# Patient Record
Sex: FEMALE | Race: WHITE | NOT HISPANIC OR LATINO | Employment: OTHER | URBAN - METROPOLITAN AREA
[De-identification: names, ages, dates, MRNs, and addresses within clinical notes are randomized per-mention and may not be internally consistent; named-entity substitution may affect disease eponyms.]

---

## 2017-02-09 ENCOUNTER — TRANSCRIBE ORDERS (OUTPATIENT)
Dept: ADMINISTRATIVE | Facility: HOSPITAL | Age: 82
End: 2017-02-09

## 2017-02-09 DIAGNOSIS — I72.9 ANEURYSM OF UNSPECIFIED SITE (HCC): Primary | ICD-10-CM

## 2017-02-17 ENCOUNTER — HOSPITAL ENCOUNTER (OUTPATIENT)
Dept: CT IMAGING | Facility: HOSPITAL | Age: 82
Discharge: HOME | End: 2017-02-17
Attending: SURGERY | Admitting: SURGERY

## 2017-02-17 DIAGNOSIS — I72.9 ANEURYSM OF UNSPECIFIED SITE (HCC): ICD-10-CM

## 2017-02-17 LAB — CREAT BLDA-MCNC: 0.9 MG/DL (ref 0.6–1.3)

## 2017-02-17 PROCEDURE — 82565 ASSAY OF CREATININE: CPT

## 2017-02-17 PROCEDURE — 0 IOPAMIDOL 61 % SOLUTION: Performed by: SURGERY

## 2017-02-17 PROCEDURE — 74174 CTA ABD&PLVS W/CONTRAST: CPT

## 2017-02-17 RX ADMIN — IOPAMIDOL 85 ML: 612 INJECTION, SOLUTION INTRAVENOUS at 13:15

## 2017-03-02 ENCOUNTER — TRANSCRIBE ORDERS (OUTPATIENT)
Dept: ADMINISTRATIVE | Facility: HOSPITAL | Age: 82
End: 2017-03-02

## 2017-03-02 DIAGNOSIS — I71.40 ABDOMINAL AORTIC ANEURYSM (AAA) WITHOUT RUPTURE (HCC): Primary | ICD-10-CM

## 2017-04-28 ENCOUNTER — OFFICE VISIT (OUTPATIENT)
Dept: INFECTIOUS DISEASES | Facility: CLINIC | Age: 82
End: 2017-04-28

## 2017-04-28 ENCOUNTER — APPOINTMENT (OUTPATIENT)
Dept: LAB | Facility: HOSPITAL | Age: 82
End: 2017-04-28

## 2017-04-28 VITALS
RESPIRATION RATE: 12 BRPM | DIASTOLIC BLOOD PRESSURE: 79 MMHG | WEIGHT: 158.2 LBS | HEART RATE: 61 BPM | HEIGHT: 68 IN | BODY MASS INDEX: 23.98 KG/M2 | SYSTOLIC BLOOD PRESSURE: 157 MMHG | TEMPERATURE: 98.5 F

## 2017-04-28 DIAGNOSIS — I72.9 MYCOTIC ANEURYSM (HCC): ICD-10-CM

## 2017-04-28 DIAGNOSIS — Z79.2 LONG TERM (CURRENT) USE OF ANTIBIOTICS: ICD-10-CM

## 2017-04-28 DIAGNOSIS — A49.01 MSSA (METHICILLIN SUSCEPTIBLE STAPHYLOCOCCUS AUREUS) INFECTION: Primary | ICD-10-CM

## 2017-04-28 LAB
ALBUMIN SERPL-MCNC: 3.9 G/DL (ref 3.5–5.2)
ALBUMIN/GLOB SERPL: 1.2 G/DL
ALP SERPL-CCNC: 68 U/L (ref 39–117)
ALT SERPL W P-5'-P-CCNC: 10 U/L (ref 1–33)
ANION GAP SERPL CALCULATED.3IONS-SCNC: 12.3 MMOL/L
AST SERPL-CCNC: 19 U/L (ref 1–32)
BASOPHILS # BLD AUTO: 0.03 10*3/MM3 (ref 0–0.2)
BASOPHILS NFR BLD AUTO: 0.4 % (ref 0–1.5)
BILIRUB SERPL-MCNC: 0.7 MG/DL (ref 0.1–1.2)
BUN BLD-MCNC: 24 MG/DL (ref 8–23)
BUN/CREAT SERPL: 26.1 (ref 7–25)
CALCIUM SPEC-SCNC: 9 MG/DL (ref 8.6–10.5)
CHLORIDE SERPL-SCNC: 103 MMOL/L (ref 98–107)
CO2 SERPL-SCNC: 27.7 MMOL/L (ref 22–29)
CREAT BLD-MCNC: 0.92 MG/DL (ref 0.57–1)
CRP SERPL-MCNC: 0.18 MG/DL (ref 0–0.5)
DEPRECATED RDW RBC AUTO: 45.3 FL (ref 37–54)
EOSINOPHIL # BLD AUTO: 0.15 10*3/MM3 (ref 0–0.7)
EOSINOPHIL NFR BLD AUTO: 2.2 % (ref 0.3–6.2)
ERYTHROCYTE [DISTWIDTH] IN BLOOD BY AUTOMATED COUNT: 14 % (ref 11.7–13)
GFR SERPL CREATININE-BSD FRML MDRD: 58 ML/MIN/1.73
GLOBULIN UR ELPH-MCNC: 3.3 GM/DL
GLUCOSE BLD-MCNC: 94 MG/DL (ref 65–99)
HCT VFR BLD AUTO: 41 % (ref 35.6–45.5)
HGB BLD-MCNC: 13.3 G/DL (ref 11.9–15.5)
IMM GRANULOCYTES # BLD: 0 10*3/MM3 (ref 0–0.03)
IMM GRANULOCYTES NFR BLD: 0 % (ref 0–0.5)
LYMPHOCYTES # BLD AUTO: 2.19 10*3/MM3 (ref 0.9–4.8)
LYMPHOCYTES NFR BLD AUTO: 31.7 % (ref 19.6–45.3)
MCH RBC QN AUTO: 28.9 PG (ref 26.9–32)
MCHC RBC AUTO-ENTMCNC: 32.4 G/DL (ref 32.4–36.3)
MCV RBC AUTO: 89.1 FL (ref 80.5–98.2)
MONOCYTES # BLD AUTO: 0.67 10*3/MM3 (ref 0.2–1.2)
MONOCYTES NFR BLD AUTO: 9.7 % (ref 5–12)
NEUTROPHILS # BLD AUTO: 3.86 10*3/MM3 (ref 1.9–8.1)
NEUTROPHILS NFR BLD AUTO: 56 % (ref 42.7–76)
PLATELET # BLD AUTO: 136 10*3/MM3 (ref 140–500)
PMV BLD AUTO: 11 FL (ref 6–12)
POTASSIUM BLD-SCNC: 4.2 MMOL/L (ref 3.5–5.2)
PROT SERPL-MCNC: 7.2 G/DL (ref 6–8.5)
RBC # BLD AUTO: 4.6 10*6/MM3 (ref 3.9–5.2)
SODIUM BLD-SCNC: 143 MMOL/L (ref 136–145)
WBC NRBC COR # BLD: 6.9 10*3/MM3 (ref 4.5–10.7)

## 2017-04-28 PROCEDURE — 80053 COMPREHEN METABOLIC PANEL: CPT | Performed by: INTERNAL MEDICINE

## 2017-04-28 PROCEDURE — 99214 OFFICE O/P EST MOD 30 MIN: CPT | Performed by: INTERNAL MEDICINE

## 2017-04-28 PROCEDURE — 36415 COLL VENOUS BLD VENIPUNCTURE: CPT | Performed by: INTERNAL MEDICINE

## 2017-04-28 PROCEDURE — 85025 COMPLETE CBC W/AUTO DIFF WBC: CPT | Performed by: INTERNAL MEDICINE

## 2017-04-28 PROCEDURE — 86140 C-REACTIVE PROTEIN: CPT | Performed by: INTERNAL MEDICINE

## 2017-04-28 RX ORDER — DOXYCYCLINE HYCLATE 100 MG/1
100 CAPSULE ORAL 2 TIMES DAILY
Qty: 180 CAPSULE | Refills: 3 | Status: SHIPPED | OUTPATIENT
Start: 2017-04-28 | End: 2017-10-29 | Stop reason: SDUPTHER

## 2017-04-28 NOTE — PROGRESS NOTES
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 She has been doing well since her popliteal aneurysm repair on 12/9/16. Her incisions are healing well. No erythema, swelling or drainage. She denies systemic symptoms such as fevers, chills, and sweats. She is tolerating doxycycline w/o rash, skin sensitivity or GI upset. She has another scan coming up to evaluate her abdominal aneurysm. She denies abdominal pain and swelling.     Review of Systems: no n/v/d; no CP or SOA      Medications:   Current Outpatient Prescriptions:   •  acetaminophen (TYLENOL) 325 MG tablet, Take 650 mg by mouth Every 6 (Six) Hours As Needed for mild pain (1-3)., Disp: , Rfl:   •  calcitriol (ROCALTROL) 0.5 MCG capsule, Take 0.5 mcg by mouth 2 (Two) Times a Day., Disp: , Rfl:   •  cholecalciferol (VITAMIN D3) 1000 UNITS tablet, Take 2,000 Units by mouth Daily. HOLD PRIOR TO SURG, Disp: , Rfl:   •  dabigatran etexilate (PRADAXA) 150 MG capsu, Take 150 mg by mouth 2 (Two) Times a Day. PT INSTRUCTED TO STOP 2 DAYS BEFORE SURGERY PER MD, Disp: , Rfl:   •  diltiazem (CARDIZEM) 120 MG tablet, Take 120 mg by mouth Daily., Disp: , Rfl:   •  doxycycline (VIBRAMYCIN) 100 MG capsule, Take 100 mg by mouth 2 (Two) Times a Day., Disp: , Rfl:   •  levothyroxine (SYNTHROID, LEVOTHROID) 150 MCG tablet, Take 150 mcg by mouth Every Morning., Disp: , Rfl:   •  Multiple Vitamins-Minerals (ICAPS AREDS 2 PO), Take 1 capsule by mouth 2 (Two) Times a Day., Disp: , Rfl:   •  potassium chloride (K-DUR,KLOR-CON) 20 MEQ CR tablet, Take 20 mEq by mouth 2 (Two) Times a Day., Disp: , Rfl:   •  pravastatin (PRAVACHOL) 40 MG tablet, Take 80 mg by mouth Daily., Disp: , Rfl:   •  raloxifene (EVISTA) 60 MG tablet, Take 60 mg by mouth Every Morning., Disp: , Rfl:   •  sertraline (ZOLOFT) 100 MG tablet, Take 100 mg by mouth Daily., Disp: , Rfl:  "        OBJECTIVE:  /79  Pulse 61  Temp 98.5 °F (36.9 °C)  Resp 12  Ht 68\" (172.7 cm)  Wt 158 lb 3.2 oz (71.8 kg)  BMI 24.05 kg/m2    General: awake, alert, NAD  Head: Normocephalic  Eyes: no scleral icterus  ENT: MMM, OP clear, no thrush. Dentures.   Neck: Supple  Cardiovascular: NR, trace pitting LLE edema  Respiratory: normal work of breathing on ambient air  GI: Abdomen is soft, non-tender, non-distended  Skin: No rashes, lesions, or embolic phenomenon;  incisions are healing well  Psychiatric: Normal mood and affect   Vasc: no cyanosis      DIAGNOSTICS:  All below labs reviewed today  Lab Results   Component Value Date    WBC 5.26 12/11/2016    HGB 9.1 (L) 12/11/2016    HCT 30.0 (L) 12/11/2016    MCV 91.5 12/11/2016    PLT 83 (L) 12/11/2016     Lab Results   Component Value Date    GLUCOSE 106 (H) 12/10/2016    BUN 20 12/10/2016    CREATININE 0.90 02/17/2017    EGFRIFNONA 72 12/10/2016    BCR 26.0 (H) 12/10/2016    K 3.5 12/10/2016    CO2 23.1 12/10/2016    CALCIUM 7.6 (L) 12/10/2016    ALBUMIN 3.70 12/02/2016    LABIL2 1.3 12/02/2016    AST 12 12/02/2016    ALT 9 12/02/2016     Lab Results   Component Value Date    CRP 0.99 (H) 12/10/2016       Microbiology:  10/2 BCx: negative  10/3 BCx: NGTD  10/6 BCx: NGTD  10/7 Tissue Cx Femoral Artery: MSSA      Radiology (personally reviewed):  No new imaging      ASSESSMENT/PLAN:  1. 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 L ankle fracture s/p ORIF - previous MRI was negative for osteomyelitis/hardware infection. "      3. CAD     4. Long term use of antibiotics      RTC 12 months or sooner if needed

## 2017-06-02 ENCOUNTER — APPOINTMENT (OUTPATIENT)
Dept: CT IMAGING | Facility: HOSPITAL | Age: 82
End: 2017-06-02
Attending: SURGERY

## 2017-10-30 RX ORDER — DOXYCYCLINE HYCLATE 100 MG/1
CAPSULE ORAL
Qty: 180 CAPSULE | Refills: 6 | Status: SHIPPED | OUTPATIENT
Start: 2017-10-30 | End: 2017-11-29

## 2018-03-20 ENCOUNTER — OFFICE VISIT CONVERTED (OUTPATIENT)
Dept: FAMILY MEDICINE CLINIC | Age: 83
End: 2018-03-20
Attending: NURSE PRACTITIONER

## 2018-06-04 ENCOUNTER — APPOINTMENT (OUTPATIENT)
Dept: LAB | Facility: HOSPITAL | Age: 83
End: 2018-06-04
Attending: INTERNAL MEDICINE

## 2018-06-04 ENCOUNTER — OFFICE VISIT (OUTPATIENT)
Dept: INFECTIOUS DISEASES | Facility: CLINIC | Age: 83
End: 2018-06-04

## 2018-06-04 VITALS
SYSTOLIC BLOOD PRESSURE: 169 MMHG | TEMPERATURE: 97.9 F | WEIGHT: 170.6 LBS | HEART RATE: 83 BPM | BODY MASS INDEX: 25.85 KG/M2 | DIASTOLIC BLOOD PRESSURE: 95 MMHG | HEIGHT: 68 IN

## 2018-06-04 DIAGNOSIS — I72.9 MYCOTIC ANEURYSM (HCC): ICD-10-CM

## 2018-06-04 DIAGNOSIS — A49.01 MSSA (METHICILLIN SUSCEPTIBLE STAPHYLOCOCCUS AUREUS) INFECTION: Primary | ICD-10-CM

## 2018-06-04 DIAGNOSIS — Z79.2 LONG TERM (CURRENT) USE OF ANTIBIOTICS: ICD-10-CM

## 2018-06-04 LAB
ALBUMIN SERPL-MCNC: 3.8 G/DL (ref 3.5–5.2)
ALBUMIN/GLOB SERPL: 1.4 G/DL
ALP SERPL-CCNC: 57 U/L (ref 39–117)
ALT SERPL W P-5'-P-CCNC: 13 U/L (ref 1–33)
ANION GAP SERPL CALCULATED.3IONS-SCNC: 12.6 MMOL/L
AST SERPL-CCNC: 16 U/L (ref 1–32)
BASOPHILS # BLD AUTO: 0.03 10*3/MM3 (ref 0–0.2)
BASOPHILS NFR BLD AUTO: 0.4 % (ref 0–1.5)
BILIRUB SERPL-MCNC: 0.6 MG/DL (ref 0.1–1.2)
BUN BLD-MCNC: 22 MG/DL (ref 8–23)
BUN/CREAT SERPL: 22.7 (ref 7–25)
CALCIUM SPEC-SCNC: 9.3 MG/DL (ref 8.6–10.5)
CHLORIDE SERPL-SCNC: 105 MMOL/L (ref 98–107)
CO2 SERPL-SCNC: 27.4 MMOL/L (ref 22–29)
CREAT BLD-MCNC: 0.97 MG/DL (ref 0.57–1)
CRP SERPL-MCNC: 0.12 MG/DL (ref 0–0.5)
DEPRECATED RDW RBC AUTO: 42.5 FL (ref 37–54)
EOSINOPHIL # BLD AUTO: 0.11 10*3/MM3 (ref 0–0.7)
EOSINOPHIL NFR BLD AUTO: 1.6 % (ref 0.3–6.2)
ERYTHROCYTE [DISTWIDTH] IN BLOOD BY AUTOMATED COUNT: 13.1 % (ref 11.7–13)
GFR SERPL CREATININE-BSD FRML MDRD: 55 ML/MIN/1.73
GLOBULIN UR ELPH-MCNC: 2.8 GM/DL
GLUCOSE BLD-MCNC: 112 MG/DL (ref 65–99)
HCT VFR BLD AUTO: 42.3 % (ref 35.6–45.5)
HGB BLD-MCNC: 13.4 G/DL (ref 11.9–15.5)
IMM GRANULOCYTES # BLD: 0 10*3/MM3 (ref 0–0.03)
IMM GRANULOCYTES NFR BLD: 0 % (ref 0–0.5)
LYMPHOCYTES # BLD AUTO: 1.81 10*3/MM3 (ref 0.9–4.8)
LYMPHOCYTES NFR BLD AUTO: 25.6 % (ref 19.6–45.3)
MCH RBC QN AUTO: 28.2 PG (ref 26.9–32)
MCHC RBC AUTO-ENTMCNC: 31.7 G/DL (ref 32.4–36.3)
MCV RBC AUTO: 89.1 FL (ref 80.5–98.2)
MONOCYTES # BLD AUTO: 0.72 10*3/MM3 (ref 0.2–1.2)
MONOCYTES NFR BLD AUTO: 10.2 % (ref 5–12)
NEUTROPHILS # BLD AUTO: 4.41 10*3/MM3 (ref 1.9–8.1)
NEUTROPHILS NFR BLD AUTO: 62.2 % (ref 42.7–76)
PLATELET # BLD AUTO: 135 10*3/MM3 (ref 140–500)
PMV BLD AUTO: 10.9 FL (ref 6–12)
POTASSIUM BLD-SCNC: 4.1 MMOL/L (ref 3.5–5.2)
PROT SERPL-MCNC: 6.6 G/DL (ref 6–8.5)
RBC # BLD AUTO: 4.75 10*6/MM3 (ref 3.9–5.2)
SODIUM BLD-SCNC: 145 MMOL/L (ref 136–145)
WBC NRBC COR # BLD: 7.08 10*3/MM3 (ref 4.5–10.7)

## 2018-06-04 PROCEDURE — 36415 COLL VENOUS BLD VENIPUNCTURE: CPT | Performed by: INTERNAL MEDICINE

## 2018-06-04 PROCEDURE — 85025 COMPLETE CBC W/AUTO DIFF WBC: CPT | Performed by: INTERNAL MEDICINE

## 2018-06-04 PROCEDURE — 86140 C-REACTIVE PROTEIN: CPT | Performed by: INTERNAL MEDICINE

## 2018-06-04 PROCEDURE — 99213 OFFICE O/P EST LOW 20 MIN: CPT | Performed by: INTERNAL MEDICINE

## 2018-06-04 PROCEDURE — 80053 COMPREHEN METABOLIC PANEL: CPT | Performed by: INTERNAL MEDICINE

## 2018-06-04 RX ORDER — DOXYCYCLINE HYCLATE 100 MG/1
100 CAPSULE ORAL 2 TIMES DAILY
Qty: 180 CAPSULE | Refills: 3 | Status: SHIPPED | OUTPATIENT
Start: 2018-06-04 | End: 2018-09-02

## 2018-06-04 RX ORDER — DOXYCYCLINE HYCLATE 100 MG/1
100 CAPSULE ORAL 2 TIMES DAILY
COMMUNITY
End: 2018-06-04 | Stop reason: SDUPTHER

## 2018-06-04 RX ORDER — SACCHAROMYCES BOULARDII 250 MG
250 CAPSULE ORAL DAILY
COMMUNITY
End: 2020-08-14

## 2018-06-04 NOTE — PROGRESS NOTES
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 She remains on doxycycline suppression without side effects. She says her surgical incisions are healed. There is no erythema or drainage. No fevers, chills, or sweats. No further surgical interventions.    She last saw Dr Ott in Sept 2017 with a good report.     Review of Systems: no n/v/d      Medications:   Current Outpatient Prescriptions:   •  acetaminophen (TYLENOL) 325 MG tablet, Take 650 mg by mouth Every 6 (Six) Hours As Needed for mild pain (1-3)., Disp: , Rfl:   •  calcitriol (ROCALTROL) 0.5 MCG capsule, Take 0.5 mcg by mouth 2 (Two) Times a Day., Disp: , Rfl:   •  cholecalciferol (VITAMIN D3) 1000 UNITS tablet, Take 2,000 Units by mouth Daily. HOLD PRIOR TO SURG, Disp: , Rfl:   •  dabigatran etexilate (PRADAXA) 150 MG capsu, Take 150 mg by mouth 2 (Two) Times a Day. PT INSTRUCTED TO STOP 2 DAYS BEFORE SURGERY PER MD, Disp: , Rfl:   •  diltiazem (CARDIZEM) 120 MG tablet, Take 120 mg by mouth Daily., Disp: , Rfl:   •  levothyroxine (SYNTHROID, LEVOTHROID) 150 MCG tablet, Take 150 mcg by mouth Every Morning., Disp: , Rfl:   •  Multiple Vitamins-Minerals (ICAPS AREDS 2 PO), Take 1 capsule by mouth 2 (Two) Times a Day., Disp: , Rfl:   •  potassium chloride (K-DUR,KLOR-CON) 20 MEQ CR tablet, Take 20 mEq by mouth 2 (Two) Times a Day., Disp: , Rfl:   •  pravastatin (PRAVACHOL) 40 MG tablet, Take 80 mg by mouth Daily., Disp: , Rfl:   •  raloxifene (EVISTA) 60 MG tablet, Take 60 mg by mouth Every Morning., Disp: , Rfl:   •  sertraline (ZOLOFT) 100 MG tablet, Take 100 mg by mouth Daily., Disp: , Rfl:         OBJECTIVE:  /95   Pulse 83   Temp 97.9 °F (36.6 °C)   Ht 172.7 cm (67.99\")   Wt 77.4 kg (170 lb 9.6 oz)   BMI 25.95 kg/m²     General: awake, alert, NAD  Head: Normocephalic  Eyes: no scleral icterus  ENT: MMM,  " Dentures.   Neck: Supple  Cardiovascular: NR, trace pitting LLE edema  Respiratory: normal work of breathing on ambient air  Skin: No rashes, lesions, or embolic phenomenon;  incisions are healed; no erythema or drainage  Psychiatric: Normal mood and affect   Vasc: no cyanosis      DIAGNOSTICS:  CBC, CMP, CRP reviewed today  Lab Results   Component Value Date    WBC 6.90 04/28/2017    HGB 13.3 04/28/2017    HCT 41.0 04/28/2017    MCV 89.1 04/28/2017     (L) 04/28/2017     Lab Results   Component Value Date    GLUCOSE 94 04/28/2017    BUN 24 (H) 04/28/2017    CREATININE 0.92 04/28/2017    EGFRIFNONA 58 (L) 04/28/2017    BCR 26.1 (H) 04/28/2017    K 4.2 04/28/2017    CO2 27.7 04/28/2017    CALCIUM 9.0 04/28/2017    ALBUMIN 3.90 04/28/2017    LABIL2 1.2 04/28/2017    AST 19 04/28/2017    ALT 10 04/28/2017     Lab Results   Component Value Date    CRP 0.18 04/28/2017       Microbiology:  10/2 BCx: negative  10/3 BCx: negative  10/6 BCx: negative  10/7 Tissue Cx Femoral Artery: MSSA (also sensitive to doxy, bactrim; R - clinda)      Radiology (personally reviewed):  No new imaging      ASSESSMENT/PLAN:  1. 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 L ankle fracture s/p ORIF - previous MRI was negative for osteomyelitis/hardware infection.      3. Long term use of antibiotics    -monitoring labs today as per #1    RTC 12 months or sooner if needed

## 2018-07-05 ENCOUNTER — OFFICE VISIT CONVERTED (OUTPATIENT)
Dept: FAMILY MEDICINE CLINIC | Age: 83
End: 2018-07-05
Attending: FAMILY MEDICINE

## 2018-07-19 ENCOUNTER — OFFICE VISIT CONVERTED (OUTPATIENT)
Dept: FAMILY MEDICINE CLINIC | Age: 83
End: 2018-07-19
Attending: FAMILY MEDICINE

## 2019-01-10 RX ORDER — DOXYCYCLINE HYCLATE 100 MG/1
CAPSULE ORAL
Qty: 180 CAPSULE | Refills: 1 | Status: SHIPPED | OUTPATIENT
Start: 2019-01-10 | End: 2019-07-26 | Stop reason: SDUPTHER

## 2019-03-05 ENCOUNTER — OFFICE VISIT CONVERTED (OUTPATIENT)
Dept: FAMILY MEDICINE CLINIC | Age: 84
End: 2019-03-05
Attending: FAMILY MEDICINE

## 2019-03-05 ENCOUNTER — HOSPITAL ENCOUNTER (OUTPATIENT)
Dept: OTHER | Facility: HOSPITAL | Age: 84
Discharge: HOME | End: 2019-03-05
Attending: FAMILY MEDICINE

## 2019-03-05 LAB
ALBUMIN SERPL-MCNC: 4 G/DL (ref 3.5–5)
ALBUMIN/GLOB SERPL: 1.5 {RATIO} (ref 1.4–2.6)
ALP SERPL-CCNC: 59 U/L (ref 43–160)
ALT SERPL-CCNC: 13 U/L (ref 10–40)
ANION GAP SERPL CALC-SCNC: 17 MMOL/L (ref 8–19)
AST SERPL-CCNC: 17 U/L (ref 15–50)
BILIRUB SERPL-MCNC: 0.78 MG/DL (ref 0.2–1.3)
BUN SERPL-MCNC: 19 MG/DL (ref 5–25)
BUN/CREAT SERPL: 19 {RATIO} (ref 6–20)
CALCIUM SERPL-MCNC: 9.4 MG/DL (ref 8.7–10.4)
CHLORIDE SERPL-SCNC: 106 MMOL/L (ref 99–111)
CONV CO2: 28 MMOL/L (ref 22–32)
CONV TOTAL PROTEIN: 6.6 G/DL (ref 6.3–8.2)
CREAT UR-MCNC: 1.01 MG/DL (ref 0.5–0.9)
ERYTHROCYTE [DISTWIDTH] IN BLOOD BY AUTOMATED COUNT: 13.2 % (ref 11.5–14.5)
GFR SERPLBLD BASED ON 1.73 SQ M-ARVRAT: 51 ML/MIN/{1.73_M2}
GLOBULIN UR ELPH-MCNC: 2.6 G/DL (ref 2–3.5)
GLUCOSE SERPL-MCNC: 111 MG/DL (ref 65–99)
HBA1C MFR BLD: 13.9 G/DL (ref 12–16)
HCT VFR BLD AUTO: 42.9 % (ref 37–47)
MCH RBC QN AUTO: 28.8 PG (ref 27–31)
MCHC RBC AUTO-ENTMCNC: 32.4 G/DL (ref 33–37)
MCV RBC AUTO: 88.8 FL (ref 81–99)
OSMOLALITY SERPL CALC.SUM OF ELEC: 307 MOSM/KG (ref 273–304)
PLATELET # BLD AUTO: 125 10*3/UL (ref 130–400)
PMV BLD AUTO: 11.6 FL (ref 7.4–10.4)
POTASSIUM SERPL-SCNC: 4.3 MMOL/L (ref 3.5–5.3)
RBC # BLD AUTO: 4.83 10*6/UL (ref 4.2–5.4)
SODIUM SERPL-SCNC: 147 MMOL/L (ref 135–147)
TSH SERPL-ACNC: 4.5 M[IU]/L (ref 0.27–4.2)
VIT B12 SERPL-MCNC: 366 PG/ML (ref 211–911)
WBC # BLD AUTO: 5.9 10*3/UL (ref 4.8–10.8)

## 2019-05-17 ENCOUNTER — HOSPITAL ENCOUNTER (OUTPATIENT)
Dept: OTHER | Facility: HOSPITAL | Age: 84
Discharge: HOME | End: 2019-05-17
Attending: FAMILY MEDICINE

## 2019-05-17 LAB
ERYTHROCYTE [DISTWIDTH] IN BLOOD BY AUTOMATED COUNT: 12.7 % (ref 11.5–14.5)
HBA1C MFR BLD: 14.2 G/DL (ref 12–16)
HCT VFR BLD AUTO: 42.8 % (ref 37–47)
MCH RBC QN AUTO: 28.3 PG (ref 27–31)
MCHC RBC AUTO-ENTMCNC: 33.2 G/DL (ref 33–37)
MCV RBC AUTO: 85.4 FL (ref 81–99)
PLATELET # BLD AUTO: 142 10*3/UL (ref 130–400)
PMV BLD AUTO: 11.2 FL (ref 7.4–10.4)
RBC # BLD AUTO: 5.01 10*6/UL (ref 4.2–5.4)
TSH SERPL-ACNC: 1.86 M[IU]/L (ref 0.27–4.2)
WBC # BLD AUTO: 5.98 10*3/UL (ref 4.8–10.8)

## 2019-06-24 ENCOUNTER — HOSPITAL ENCOUNTER (INPATIENT)
Facility: HOSPITAL | Age: 84
LOS: 4 days | Discharge: HOME | End: 2019-06-28
Attending: EMERGENCY MEDICINE | Admitting: HOSPITALIST

## 2019-06-24 DIAGNOSIS — K92.2 UPPER GI BLEED: Primary | ICD-10-CM

## 2019-06-24 DIAGNOSIS — D64.9 ANEMIA, UNSPECIFIED TYPE: ICD-10-CM

## 2019-06-24 DIAGNOSIS — K92.1 GASTROINTESTINAL HEMORRHAGE WITH MELENA: ICD-10-CM

## 2019-06-24 PROBLEM — D62 ACUTE POSTHEMORRHAGIC ANEMIA: Status: ACTIVE | Noted: 2019-06-24

## 2019-06-24 PROBLEM — D68.32 HEMORRHAGIC DISORDER DUE TO EXTRINSIC CIRCULATING ANTICOAGULANTS (HCC): Status: ACTIVE | Noted: 2019-06-24

## 2019-06-24 LAB
ALBUMIN SERPL-MCNC: 3.6 G/DL (ref 3.5–5.2)
ALBUMIN/GLOB SERPL: 1.2 G/DL
ALP SERPL-CCNC: 72 U/L (ref 39–117)
ALT SERPL W P-5'-P-CCNC: 23 U/L (ref 1–33)
ANION GAP SERPL CALCULATED.3IONS-SCNC: 10.2 MMOL/L
AST SERPL-CCNC: 22 U/L (ref 1–32)
BASOPHILS # BLD AUTO: 0.04 10*3/MM3 (ref 0–0.2)
BASOPHILS NFR BLD AUTO: 0.4 % (ref 0–1.5)
BILIRUB SERPL-MCNC: 0.5 MG/DL (ref 0.2–1.2)
BUN BLD-MCNC: 42 MG/DL (ref 8–23)
BUN/CREAT SERPL: 45.2 (ref 7–25)
CALCIUM SPEC-SCNC: 9.2 MG/DL (ref 8.6–10.5)
CHLORIDE SERPL-SCNC: 101 MMOL/L (ref 98–107)
CO2 SERPL-SCNC: 26.8 MMOL/L (ref 22–29)
CREAT BLD-MCNC: 0.93 MG/DL (ref 0.57–1)
D-LACTATE SERPL-SCNC: 0.9 MMOL/L (ref 0.5–2)
DEPRECATED RDW RBC AUTO: 40.9 FL (ref 37–54)
EOSINOPHIL # BLD AUTO: 0.09 10*3/MM3 (ref 0–0.4)
EOSINOPHIL NFR BLD AUTO: 0.9 % (ref 0.3–6.2)
ERYTHROCYTE [DISTWIDTH] IN BLOOD BY AUTOMATED COUNT: 12.6 % (ref 12.3–15.4)
GFR SERPL CREATININE-BSD FRML MDRD: 57 ML/MIN/1.73
GLOBULIN UR ELPH-MCNC: 3.1 GM/DL
GLUCOSE BLD-MCNC: 154 MG/DL (ref 65–99)
HCT VFR BLD AUTO: 35.7 % (ref 34–46.6)
HGB BLD-MCNC: 11.4 G/DL (ref 12–15.9)
IMM GRANULOCYTES # BLD AUTO: 0.09 10*3/MM3 (ref 0–0.05)
IMM GRANULOCYTES NFR BLD AUTO: 0.9 % (ref 0–0.5)
INR PPP: 1.4 (ref 0.9–1.1)
LYMPHOCYTES # BLD AUTO: 1.93 10*3/MM3 (ref 0.7–3.1)
LYMPHOCYTES NFR BLD AUTO: 19.3 % (ref 19.6–45.3)
MCH RBC QN AUTO: 28.4 PG (ref 26.6–33)
MCHC RBC AUTO-ENTMCNC: 31.9 G/DL (ref 31.5–35.7)
MCV RBC AUTO: 88.8 FL (ref 79–97)
MONOCYTES # BLD AUTO: 0.84 10*3/MM3 (ref 0.1–0.9)
MONOCYTES NFR BLD AUTO: 8.4 % (ref 5–12)
NEUTROPHILS # BLD AUTO: 7 10*3/MM3 (ref 1.7–7)
NEUTROPHILS NFR BLD AUTO: 70.1 % (ref 42.7–76)
NRBC BLD AUTO-RTO: 0 /100 WBC (ref 0–0.2)
PLATELET # BLD AUTO: 173 10*3/MM3 (ref 140–450)
PMV BLD AUTO: 10.8 FL (ref 6–12)
POTASSIUM BLD-SCNC: 4.8 MMOL/L (ref 3.5–5.2)
PROT SERPL-MCNC: 6.7 G/DL (ref 6–8.5)
PROTHROMBIN TIME: 16.8 SECONDS (ref 11.7–14.2)
RBC # BLD AUTO: 4.02 10*6/MM3 (ref 3.77–5.28)
SODIUM BLD-SCNC: 138 MMOL/L (ref 136–145)
WBC NRBC COR # BLD: 9.99 10*3/MM3 (ref 3.4–10.8)

## 2019-06-24 PROCEDURE — 86901 BLOOD TYPING SEROLOGIC RH(D): CPT | Performed by: EMERGENCY MEDICINE

## 2019-06-24 PROCEDURE — 83605 ASSAY OF LACTIC ACID: CPT | Performed by: EMERGENCY MEDICINE

## 2019-06-24 PROCEDURE — 86850 RBC ANTIBODY SCREEN: CPT | Performed by: EMERGENCY MEDICINE

## 2019-06-24 PROCEDURE — 85025 COMPLETE CBC W/AUTO DIFF WBC: CPT | Performed by: EMERGENCY MEDICINE

## 2019-06-24 PROCEDURE — 85610 PROTHROMBIN TIME: CPT | Performed by: EMERGENCY MEDICINE

## 2019-06-24 PROCEDURE — 99284 EMERGENCY DEPT VISIT MOD MDM: CPT

## 2019-06-24 PROCEDURE — 86900 BLOOD TYPING SEROLOGIC ABO: CPT | Performed by: EMERGENCY MEDICINE

## 2019-06-24 PROCEDURE — 80053 COMPREHEN METABOLIC PANEL: CPT | Performed by: EMERGENCY MEDICINE

## 2019-06-24 RX ORDER — PANTOPRAZOLE SODIUM 40 MG/10ML
80 INJECTION, POWDER, LYOPHILIZED, FOR SOLUTION INTRAVENOUS ONCE
Status: COMPLETED | OUTPATIENT
Start: 2019-06-24 | End: 2019-06-24

## 2019-06-24 RX ORDER — SODIUM CHLORIDE 0.9 % (FLUSH) 0.9 %
10 SYRINGE (ML) INJECTION AS NEEDED
Status: DISCONTINUED | OUTPATIENT
Start: 2019-06-24 | End: 2019-06-25

## 2019-06-24 RX ADMIN — SODIUM CHLORIDE 8 MG/HR: 900 INJECTION INTRAVENOUS at 23:49

## 2019-06-24 RX ADMIN — PANTOPRAZOLE SODIUM 80 MG: 40 INJECTION, POWDER, FOR SOLUTION INTRAVENOUS at 23:48

## 2019-06-24 RX ADMIN — SODIUM CHLORIDE 1000 ML: 9 INJECTION, SOLUTION INTRAVENOUS at 23:49

## 2019-06-25 PROBLEM — K92.1 GASTROINTESTINAL HEMORRHAGE WITH MELENA: Status: ACTIVE | Noted: 2019-06-24

## 2019-06-25 LAB
ABO GROUP BLD: NORMAL
ANION GAP SERPL CALCULATED.3IONS-SCNC: 8.8 MMOL/L
BLD GP AB SCN SERPL QL: NEGATIVE
BUN BLD-MCNC: 29 MG/DL (ref 8–23)
BUN/CREAT SERPL: 37.2 (ref 7–25)
CALCIUM SPEC-SCNC: 8.1 MG/DL (ref 8.6–10.5)
CHLORIDE SERPL-SCNC: 110 MMOL/L (ref 98–107)
CO2 SERPL-SCNC: 24.2 MMOL/L (ref 22–29)
CREAT BLD-MCNC: 0.78 MG/DL (ref 0.57–1)
DEPRECATED RDW RBC AUTO: 41.3 FL (ref 37–54)
ERYTHROCYTE [DISTWIDTH] IN BLOOD BY AUTOMATED COUNT: 12.5 % (ref 12.3–15.4)
GFR SERPL CREATININE-BSD FRML MDRD: 70 ML/MIN/1.73
GLUCOSE BLD-MCNC: 120 MG/DL (ref 65–99)
HCT VFR BLD AUTO: 29.1 % (ref 34–46.6)
HCT VFR BLD AUTO: 32.3 % (ref 34–46.6)
HGB BLD-MCNC: 10.2 G/DL (ref 12–15.9)
HGB BLD-MCNC: 9.1 G/DL (ref 12–15.9)
HOLD SPECIMEN: NORMAL
HOLD SPECIMEN: NORMAL
MCH RBC QN AUTO: 28.3 PG (ref 26.6–33)
MCHC RBC AUTO-ENTMCNC: 31.3 G/DL (ref 31.5–35.7)
MCV RBC AUTO: 90.7 FL (ref 79–97)
PLATELET # BLD AUTO: 127 10*3/MM3 (ref 140–450)
PMV BLD AUTO: 11.6 FL (ref 6–12)
POTASSIUM BLD-SCNC: 3.8 MMOL/L (ref 3.5–5.2)
RBC # BLD AUTO: 3.21 10*6/MM3 (ref 3.77–5.28)
RH BLD: POSITIVE
SODIUM BLD-SCNC: 143 MMOL/L (ref 136–145)
T&S EXPIRATION DATE: NORMAL
WBC NRBC COR # BLD: 6.7 10*3/MM3 (ref 3.4–10.8)
WHOLE BLOOD HOLD SPECIMEN: NORMAL
WHOLE BLOOD HOLD SPECIMEN: NORMAL

## 2019-06-25 PROCEDURE — 99221 1ST HOSP IP/OBS SF/LOW 40: CPT | Performed by: INTERNAL MEDICINE

## 2019-06-25 PROCEDURE — 85018 HEMOGLOBIN: CPT | Performed by: HOSPITALIST

## 2019-06-25 PROCEDURE — 36415 COLL VENOUS BLD VENIPUNCTURE: CPT | Performed by: HOSPITALIST

## 2019-06-25 PROCEDURE — 85027 COMPLETE CBC AUTOMATED: CPT | Performed by: HOSPITALIST

## 2019-06-25 PROCEDURE — 85014 HEMATOCRIT: CPT | Performed by: HOSPITALIST

## 2019-06-25 PROCEDURE — 80048 BASIC METABOLIC PNL TOTAL CA: CPT | Performed by: HOSPITALIST

## 2019-06-25 RX ORDER — ONDANSETRON 4 MG/1
4 TABLET, FILM COATED ORAL EVERY 6 HOURS PRN
Status: DISCONTINUED | OUTPATIENT
Start: 2019-06-25 | End: 2019-06-28 | Stop reason: HOSPADM

## 2019-06-25 RX ORDER — CHOLECALCIFEROL (VITAMIN D3) 125 MCG
5 CAPSULE ORAL NIGHTLY PRN
Status: DISCONTINUED | OUTPATIENT
Start: 2019-06-25 | End: 2019-06-28 | Stop reason: HOSPADM

## 2019-06-25 RX ORDER — ACETAMINOPHEN 325 MG/1
650 TABLET ORAL EVERY 4 HOURS PRN
Status: DISCONTINUED | OUTPATIENT
Start: 2019-06-25 | End: 2019-06-28 | Stop reason: HOSPADM

## 2019-06-25 RX ORDER — SODIUM CHLORIDE, SODIUM LACTATE, POTASSIUM CHLORIDE, CALCIUM CHLORIDE 600; 310; 30; 20 MG/100ML; MG/100ML; MG/100ML; MG/100ML
30 INJECTION, SOLUTION INTRAVENOUS CONTINUOUS
Status: CANCELLED | OUTPATIENT
Start: 2019-06-26

## 2019-06-25 RX ORDER — SODIUM CHLORIDE 9 MG/ML
75 INJECTION, SOLUTION INTRAVENOUS CONTINUOUS
Status: DISCONTINUED | OUTPATIENT
Start: 2019-06-25 | End: 2019-06-26

## 2019-06-25 RX ORDER — ONDANSETRON 2 MG/ML
4 INJECTION INTRAMUSCULAR; INTRAVENOUS EVERY 6 HOURS PRN
Status: DISCONTINUED | OUTPATIENT
Start: 2019-06-25 | End: 2019-06-28 | Stop reason: HOSPADM

## 2019-06-25 RX ADMIN — SODIUM CHLORIDE 8 MG/HR: 900 INJECTION INTRAVENOUS at 11:09

## 2019-06-25 RX ADMIN — Medication 5 MG: at 22:30

## 2019-06-25 RX ADMIN — SODIUM CHLORIDE 8 MG/HR: 900 INJECTION INTRAVENOUS at 17:32

## 2019-06-25 RX ADMIN — SODIUM CHLORIDE 75 ML/HR: 9 INJECTION, SOLUTION INTRAVENOUS at 11:11

## 2019-06-25 RX ADMIN — SODIUM CHLORIDE 8 MG/HR: 900 INJECTION INTRAVENOUS at 05:14

## 2019-06-25 RX ADMIN — SODIUM CHLORIDE 8 MG/HR: 900 INJECTION INTRAVENOUS at 22:30

## 2019-06-26 ENCOUNTER — ANESTHESIA (OUTPATIENT)
Dept: GASTROENTEROLOGY | Facility: HOSPITAL | Age: 84
End: 2019-06-26

## 2019-06-26 ENCOUNTER — ANESTHESIA EVENT (OUTPATIENT)
Dept: GASTROENTEROLOGY | Facility: HOSPITAL | Age: 84
End: 2019-06-26

## 2019-06-26 LAB
ALBUMIN SERPL-MCNC: 2.7 G/DL (ref 3.5–5.2)
ALBUMIN/GLOB SERPL: 1.1 G/DL
ALP SERPL-CCNC: 52 U/L (ref 39–117)
ALT SERPL W P-5'-P-CCNC: 15 U/L (ref 1–33)
ANION GAP SERPL CALCULATED.3IONS-SCNC: 7.4 MMOL/L
APTT PPP: 39.7 SECONDS (ref 22.7–35.4)
AST SERPL-CCNC: 13 U/L (ref 1–32)
BASOPHILS # BLD AUTO: 0.01 10*3/MM3 (ref 0–0.2)
BASOPHILS NFR BLD AUTO: 0.2 % (ref 0–1.5)
BILIRUB SERPL-MCNC: 0.3 MG/DL (ref 0.2–1.2)
BUN BLD-MCNC: 18 MG/DL (ref 8–23)
BUN/CREAT SERPL: 24.3 (ref 7–25)
CALCIUM SPEC-SCNC: 7.5 MG/DL (ref 8.6–10.5)
CHLORIDE SERPL-SCNC: 113 MMOL/L (ref 98–107)
CO2 SERPL-SCNC: 28.6 MMOL/L (ref 22–29)
CREAT BLD-MCNC: 0.74 MG/DL (ref 0.57–1)
DEPRECATED RDW RBC AUTO: 42 FL (ref 37–54)
EOSINOPHIL # BLD AUTO: 0.08 10*3/MM3 (ref 0–0.4)
EOSINOPHIL NFR BLD AUTO: 1.7 % (ref 0.3–6.2)
ERYTHROCYTE [DISTWIDTH] IN BLOOD BY AUTOMATED COUNT: 13 % (ref 12.3–15.4)
GFR SERPL CREATININE-BSD FRML MDRD: 75 ML/MIN/1.73
GLOBULIN UR ELPH-MCNC: 2.4 GM/DL
GLUCOSE BLD-MCNC: 108 MG/DL (ref 65–99)
HCT VFR BLD AUTO: 28 % (ref 34–46.6)
HCT VFR BLD AUTO: 28 % (ref 34–46.6)
HCT VFR BLD AUTO: 31.3 % (ref 34–46.6)
HCT VFR BLD AUTO: 31.3 % (ref 34–46.6)
HGB BLD-MCNC: 8.9 G/DL (ref 12–15.9)
HGB BLD-MCNC: 8.9 G/DL (ref 12–15.9)
HGB BLD-MCNC: 9.9 G/DL (ref 12–15.9)
HGB BLD-MCNC: 9.9 G/DL (ref 12–15.9)
IMM GRANULOCYTES # BLD AUTO: 0.02 10*3/MM3 (ref 0–0.05)
IMM GRANULOCYTES NFR BLD AUTO: 0.4 % (ref 0–0.5)
LYMPHOCYTES # BLD AUTO: 1.18 10*3/MM3 (ref 0.7–3.1)
LYMPHOCYTES NFR BLD AUTO: 25.7 % (ref 19.6–45.3)
MCH RBC QN AUTO: 28.1 PG (ref 26.6–33)
MCHC RBC AUTO-ENTMCNC: 31.8 G/DL (ref 31.5–35.7)
MCV RBC AUTO: 88.3 FL (ref 79–97)
MONOCYTES # BLD AUTO: 0.39 10*3/MM3 (ref 0.1–0.9)
MONOCYTES NFR BLD AUTO: 8.5 % (ref 5–12)
NEUTROPHILS # BLD AUTO: 2.94 10*3/MM3 (ref 1.7–7)
NEUTROPHILS NFR BLD AUTO: 63.9 % (ref 42.7–76)
PLATELET # BLD AUTO: 123 10*3/MM3 (ref 140–450)
PMV BLD AUTO: 10.5 FL (ref 6–12)
POTASSIUM BLD-SCNC: 3.5 MMOL/L (ref 3.5–5.2)
PROT SERPL-MCNC: 5.1 G/DL (ref 6–8.5)
RBC # BLD AUTO: 3.17 10*6/MM3 (ref 3.77–5.28)
SODIUM BLD-SCNC: 149 MMOL/L (ref 136–145)
WBC NRBC COR # BLD: 4.6 10*3/MM3 (ref 3.4–10.8)

## 2019-06-26 PROCEDURE — 85014 HEMATOCRIT: CPT | Performed by: HOSPITALIST

## 2019-06-26 PROCEDURE — 85025 COMPLETE CBC W/AUTO DIFF WBC: CPT | Performed by: HOSPITALIST

## 2019-06-26 PROCEDURE — 80053 COMPREHEN METABOLIC PANEL: CPT | Performed by: INTERNAL MEDICINE

## 2019-06-26 PROCEDURE — 25010000002 PROPOFOL 10 MG/ML EMULSION: Performed by: NURSE ANESTHETIST, CERTIFIED REGISTERED

## 2019-06-26 PROCEDURE — 85730 THROMBOPLASTIN TIME PARTIAL: CPT | Performed by: INTERNAL MEDICINE

## 2019-06-26 PROCEDURE — 43235 EGD DIAGNOSTIC BRUSH WASH: CPT | Performed by: INTERNAL MEDICINE

## 2019-06-26 PROCEDURE — 85018 HEMOGLOBIN: CPT | Performed by: HOSPITALIST

## 2019-06-26 PROCEDURE — 0DJ08ZZ INSPECTION OF UPPER INTESTINAL TRACT, VIA NATURAL OR ARTIFICIAL OPENING ENDOSCOPIC: ICD-10-PCS | Performed by: INTERNAL MEDICINE

## 2019-06-26 PROCEDURE — 36415 COLL VENOUS BLD VENIPUNCTURE: CPT | Performed by: HOSPITALIST

## 2019-06-26 RX ORDER — SACCHAROMYCES BOULARDII 250 MG
250 CAPSULE ORAL DAILY
Status: DISCONTINUED | OUTPATIENT
Start: 2019-06-26 | End: 2019-06-28 | Stop reason: HOSPADM

## 2019-06-26 RX ORDER — PROPOFOL 10 MG/ML
VIAL (ML) INTRAVENOUS AS NEEDED
Status: DISCONTINUED | OUTPATIENT
Start: 2019-06-26 | End: 2019-06-26 | Stop reason: SURG

## 2019-06-26 RX ORDER — POLYETHYLENE GLYCOL 3350, SODIUM CHLORIDE, POTASSIUM CHLORIDE, SODIUM BICARBONATE, AND SODIUM SULFATE 240; 5.84; 2.98; 6.72; 22.72 G/4L; G/4L; G/4L; G/4L; G/4L
2000 POWDER, FOR SOLUTION ORAL ONCE
Status: DISCONTINUED | OUTPATIENT
Start: 2019-06-27 | End: 2019-06-26

## 2019-06-26 RX ORDER — PRAVASTATIN SODIUM 40 MG
40 TABLET ORAL NIGHTLY
Status: DISCONTINUED | OUTPATIENT
Start: 2019-06-26 | End: 2019-06-28 | Stop reason: HOSPADM

## 2019-06-26 RX ORDER — SODIUM CHLORIDE 450 MG/100ML
75 INJECTION, SOLUTION INTRAVENOUS CONTINUOUS
Status: DISCONTINUED | OUTPATIENT
Start: 2019-06-26 | End: 2019-06-27

## 2019-06-26 RX ORDER — PROPOFOL 10 MG/ML
VIAL (ML) INTRAVENOUS CONTINUOUS PRN
Status: DISCONTINUED | OUTPATIENT
Start: 2019-06-26 | End: 2019-06-26 | Stop reason: SURG

## 2019-06-26 RX ORDER — SERTRALINE HYDROCHLORIDE 100 MG/1
100 TABLET, FILM COATED ORAL DAILY
Status: DISCONTINUED | OUTPATIENT
Start: 2019-06-26 | End: 2019-06-28 | Stop reason: HOSPADM

## 2019-06-26 RX ORDER — DOXYCYCLINE 100 MG/1
100 CAPSULE ORAL EVERY 12 HOURS SCHEDULED
Status: DISCONTINUED | OUTPATIENT
Start: 2019-06-26 | End: 2019-06-28 | Stop reason: HOSPADM

## 2019-06-26 RX ORDER — MELATONIN
2000 DAILY
Status: DISCONTINUED | OUTPATIENT
Start: 2019-06-26 | End: 2019-06-28 | Stop reason: HOSPADM

## 2019-06-26 RX ORDER — SODIUM CHLORIDE 0.9 % (FLUSH) 0.9 %
3 SYRINGE (ML) INJECTION AS NEEDED
Status: DISCONTINUED | OUTPATIENT
Start: 2019-06-26 | End: 2019-06-28 | Stop reason: HOSPADM

## 2019-06-26 RX ORDER — SODIUM CHLORIDE 9 MG/ML
1000 INJECTION, SOLUTION INTRAVENOUS CONTINUOUS
Status: DISCONTINUED | OUTPATIENT
Start: 2019-06-26 | End: 2019-06-27

## 2019-06-26 RX ORDER — LIDOCAINE HYDROCHLORIDE 20 MG/ML
INJECTION, SOLUTION INFILTRATION; PERINEURAL AS NEEDED
Status: DISCONTINUED | OUTPATIENT
Start: 2019-06-26 | End: 2019-06-26 | Stop reason: SURG

## 2019-06-26 RX ORDER — RALOXIFENE HYDROCHLORIDE 60 MG/1
60 TABLET, FILM COATED ORAL DAILY
Status: DISCONTINUED | OUTPATIENT
Start: 2019-06-26 | End: 2019-06-28 | Stop reason: HOSPADM

## 2019-06-26 RX ORDER — POLYETHYLENE GLYCOL 3350, SODIUM CHLORIDE, POTASSIUM CHLORIDE, SODIUM BICARBONATE, AND SODIUM SULFATE 240; 5.84; 2.98; 6.72; 22.72 G/4L; G/4L; G/4L; G/4L; G/4L
2000 POWDER, FOR SOLUTION ORAL 2 TIMES DAILY
Status: COMPLETED | OUTPATIENT
Start: 2019-06-26 | End: 2019-06-27

## 2019-06-26 RX ORDER — LEVOTHYROXINE SODIUM 0.1 MG/1
200 TABLET ORAL
Status: DISCONTINUED | OUTPATIENT
Start: 2019-06-26 | End: 2019-06-28 | Stop reason: HOSPADM

## 2019-06-26 RX ORDER — CALCITRIOL 0.25 UG/1
0.5 CAPSULE, LIQUID FILLED ORAL EVERY 12 HOURS SCHEDULED
Status: DISCONTINUED | OUTPATIENT
Start: 2019-06-26 | End: 2019-06-28 | Stop reason: HOSPADM

## 2019-06-26 RX ADMIN — CALCITRIOL 0.5 MCG: 0.25 CAPSULE ORAL at 20:15

## 2019-06-26 RX ADMIN — SERTRALINE 100 MG: 100 TABLET, FILM COATED ORAL at 16:38

## 2019-06-26 RX ADMIN — DOXYCYCLINE 100 MG: 100 CAPSULE ORAL at 16:37

## 2019-06-26 RX ADMIN — SODIUM CHLORIDE 8 MG/HR: 900 INJECTION INTRAVENOUS at 08:13

## 2019-06-26 RX ADMIN — SODIUM CHLORIDE 75 ML/HR: 4.5 INJECTION, SOLUTION INTRAVENOUS at 11:08

## 2019-06-26 RX ADMIN — SODIUM CHLORIDE 8 MG/HR: 900 INJECTION INTRAVENOUS at 16:37

## 2019-06-26 RX ADMIN — PRAVASTATIN SODIUM 40 MG: 40 TABLET ORAL at 20:15

## 2019-06-26 RX ADMIN — SODIUM CHLORIDE 8 MG/HR: 900 INJECTION INTRAVENOUS at 03:22

## 2019-06-26 RX ADMIN — DOXYCYCLINE 100 MG: 100 CAPSULE ORAL at 20:15

## 2019-06-26 RX ADMIN — LIDOCAINE HYDROCHLORIDE 60 MG: 20 INJECTION, SOLUTION INFILTRATION; PERINEURAL at 14:20

## 2019-06-26 RX ADMIN — SODIUM CHLORIDE 8 MG/HR: 900 INJECTION INTRAVENOUS at 21:50

## 2019-06-26 RX ADMIN — SODIUM CHLORIDE 1000 ML: 9 INJECTION, SOLUTION INTRAVENOUS at 13:48

## 2019-06-26 RX ADMIN — LEVOTHYROXINE SODIUM 200 MCG: 100 TABLET ORAL at 16:37

## 2019-06-26 RX ADMIN — Medication 250 MG: at 16:37

## 2019-06-26 RX ADMIN — POLYETHYLENE GLYCOL-3350 AND ELECTROLYTES WITH FLAVOR PACK 2000 ML: 240; 5.84; 2.98; 6.72; 22.72 POWDER, FOR SOLUTION ORAL at 20:16

## 2019-06-26 RX ADMIN — CALCITRIOL 0.5 MCG: 0.25 CAPSULE ORAL at 16:38

## 2019-06-26 RX ADMIN — RALOXIFENE HYDROCHLORIDE 60 MG: 60 TABLET, FILM COATED ORAL at 16:38

## 2019-06-26 RX ADMIN — PROPOFOL 40 MG: 10 INJECTION, EMULSION INTRAVENOUS at 14:20

## 2019-06-26 RX ADMIN — PROPOFOL 200 MCG/KG/MIN: 10 INJECTION, EMULSION INTRAVENOUS at 14:20

## 2019-06-26 RX ADMIN — VITAMIN D, TAB 1000IU (100/BT) 2000 UNITS: 25 TAB at 16:37

## 2019-06-26 NOTE — ANESTHESIA PREPROCEDURE EVALUATION
Anesthesia Evaluation     Patient summary reviewed   history of anesthetic complications: PONV  NPO Solid Status: > 8 hours  NPO Liquid Status: > 8 hours           Airway   Mallampati: II  TM distance: >3 FB  Neck ROM: limited  Possible difficult intubation  Dental    (+) lower dentures and upper dentures    Pulmonary    Cardiovascular     Rhythm: irregular  Rate: normal    (+) past MI  >12 months, CAD, dysrhythmias Paroxysmal Atrial Fib, PVD, hyperlipidemia,       Neuro/Psych  GI/Hepatic/Renal/Endo    (+)  GI bleeding, hypothyroidism,     Musculoskeletal     Abdominal    Substance History      OB/GYN          Other                      Anesthesia Plan    ASA 3     MAC   total IV anesthesia  Anesthetic plan, all risks, benefits, and alternatives have been provided, discussed and informed consent has been obtained with: patient.

## 2019-06-27 ENCOUNTER — ANESTHESIA (OUTPATIENT)
Dept: GASTROENTEROLOGY | Facility: HOSPITAL | Age: 84
End: 2019-06-27

## 2019-06-27 ENCOUNTER — ANESTHESIA EVENT (OUTPATIENT)
Dept: GASTROENTEROLOGY | Facility: HOSPITAL | Age: 84
End: 2019-06-27

## 2019-06-27 LAB
ALBUMIN SERPL-MCNC: 3.5 G/DL (ref 3.5–5.2)
ALBUMIN/GLOB SERPL: 1.3 G/DL
ALP SERPL-CCNC: 66 U/L (ref 39–117)
ALT SERPL W P-5'-P-CCNC: 18 U/L (ref 1–33)
ANION GAP SERPL CALCULATED.3IONS-SCNC: 11.7 MMOL/L (ref 5–15)
APTT PPP: 39.4 SECONDS (ref 22.7–35.4)
AST SERPL-CCNC: 19 U/L (ref 1–32)
BASOPHILS # BLD AUTO: 0.01 10*3/MM3 (ref 0–0.2)
BASOPHILS NFR BLD AUTO: 0.2 % (ref 0–1.5)
BILIRUB SERPL-MCNC: 0.4 MG/DL (ref 0.2–1.2)
BUN BLD-MCNC: 10 MG/DL (ref 8–23)
BUN/CREAT SERPL: 14.3 (ref 7–25)
CALCIUM SPEC-SCNC: 7.6 MG/DL (ref 8.6–10.5)
CHLORIDE SERPL-SCNC: 106 MMOL/L (ref 98–107)
CO2 SERPL-SCNC: 27.3 MMOL/L (ref 22–29)
CREAT BLD-MCNC: 0.7 MG/DL (ref 0.57–1)
DEPRECATED RDW RBC AUTO: 41.5 FL (ref 37–54)
EOSINOPHIL # BLD AUTO: 0.1 10*3/MM3 (ref 0–0.4)
EOSINOPHIL NFR BLD AUTO: 1.7 % (ref 0.3–6.2)
ERYTHROCYTE [DISTWIDTH] IN BLOOD BY AUTOMATED COUNT: 12.8 % (ref 12.3–15.4)
GFR SERPL CREATININE-BSD FRML MDRD: 80 ML/MIN/1.73
GLOBULIN UR ELPH-MCNC: 2.6 GM/DL
GLUCOSE BLD-MCNC: 99 MG/DL (ref 65–99)
HCT VFR BLD AUTO: 29.7 % (ref 34–46.6)
HCT VFR BLD AUTO: 31 % (ref 34–46.6)
HCT VFR BLD AUTO: 33.7 % (ref 34–46.6)
HCT VFR BLD AUTO: 33.7 % (ref 34–46.6)
HGB BLD-MCNC: 10.5 G/DL (ref 12–15.9)
HGB BLD-MCNC: 10.5 G/DL (ref 12–15.9)
HGB BLD-MCNC: 9.3 G/DL (ref 12–15.9)
HGB BLD-MCNC: 9.8 G/DL (ref 12–15.9)
IMM GRANULOCYTES # BLD AUTO: 0.04 10*3/MM3 (ref 0–0.05)
IMM GRANULOCYTES NFR BLD AUTO: 0.7 % (ref 0–0.5)
INR PPP: 1.18 (ref 0.9–1.1)
LYMPHOCYTES # BLD AUTO: 1.33 10*3/MM3 (ref 0.7–3.1)
LYMPHOCYTES NFR BLD AUTO: 22.5 % (ref 19.6–45.3)
MCH RBC QN AUTO: 27.8 PG (ref 26.6–33)
MCHC RBC AUTO-ENTMCNC: 31.2 G/DL (ref 31.5–35.7)
MCV RBC AUTO: 89.2 FL (ref 79–97)
MONOCYTES # BLD AUTO: 0.51 10*3/MM3 (ref 0.1–0.9)
MONOCYTES NFR BLD AUTO: 8.6 % (ref 5–12)
NEUTROPHILS # BLD AUTO: 3.93 10*3/MM3 (ref 1.7–7)
NEUTROPHILS NFR BLD AUTO: 66.3 % (ref 42.7–76)
PLATELET # BLD AUTO: 142 10*3/MM3 (ref 140–450)
PMV BLD AUTO: 10.5 FL (ref 6–12)
POTASSIUM BLD-SCNC: 3.1 MMOL/L (ref 3.5–5.2)
PROT SERPL-MCNC: 6.1 G/DL (ref 6–8.5)
PROTHROMBIN TIME: 14.7 SECONDS (ref 11.7–14.2)
RBC # BLD AUTO: 3.78 10*6/MM3 (ref 3.77–5.28)
SODIUM BLD-SCNC: 145 MMOL/L (ref 136–145)
WBC NRBC COR # BLD: 5.92 10*3/MM3 (ref 3.4–10.8)

## 2019-06-27 PROCEDURE — 0DBK8ZX EXCISION OF ASCENDING COLON, VIA NATURAL OR ARTIFICIAL OPENING ENDOSCOPIC, DIAGNOSTIC: ICD-10-PCS | Performed by: INTERNAL MEDICINE

## 2019-06-27 PROCEDURE — 85610 PROTHROMBIN TIME: CPT | Performed by: INTERNAL MEDICINE

## 2019-06-27 PROCEDURE — 85014 HEMATOCRIT: CPT | Performed by: HOSPITALIST

## 2019-06-27 PROCEDURE — 85018 HEMOGLOBIN: CPT | Performed by: HOSPITALIST

## 2019-06-27 PROCEDURE — 0DBM8ZX EXCISION OF DESCENDING COLON, VIA NATURAL OR ARTIFICIAL OPENING ENDOSCOPIC, DIAGNOSTIC: ICD-10-PCS | Performed by: INTERNAL MEDICINE

## 2019-06-27 PROCEDURE — 93010 ELECTROCARDIOGRAM REPORT: CPT | Performed by: INTERNAL MEDICINE

## 2019-06-27 PROCEDURE — 85025 COMPLETE CBC W/AUTO DIFF WBC: CPT | Performed by: INTERNAL MEDICINE

## 2019-06-27 PROCEDURE — 36415 COLL VENOUS BLD VENIPUNCTURE: CPT | Performed by: INTERNAL MEDICINE

## 2019-06-27 PROCEDURE — 45385 COLONOSCOPY W/LESION REMOVAL: CPT | Performed by: INTERNAL MEDICINE

## 2019-06-27 PROCEDURE — 93005 ELECTROCARDIOGRAM TRACING: CPT | Performed by: ANESTHESIOLOGY

## 2019-06-27 PROCEDURE — 0W3P8ZZ CONTROL BLEEDING IN GASTROINTESTINAL TRACT, VIA NATURAL OR ARTIFICIAL OPENING ENDOSCOPIC: ICD-10-PCS | Performed by: INTERNAL MEDICINE

## 2019-06-27 PROCEDURE — 88305 TISSUE EXAM BY PATHOLOGIST: CPT | Performed by: INTERNAL MEDICINE

## 2019-06-27 PROCEDURE — 80053 COMPREHEN METABOLIC PANEL: CPT | Performed by: INTERNAL MEDICINE

## 2019-06-27 PROCEDURE — 85730 THROMBOPLASTIN TIME PARTIAL: CPT | Performed by: INTERNAL MEDICINE

## 2019-06-27 PROCEDURE — 25010000002 PROPOFOL 10 MG/ML EMULSION: Performed by: NURSE ANESTHETIST, CERTIFIED REGISTERED

## 2019-06-27 PROCEDURE — 0DBH8ZX EXCISION OF CECUM, VIA NATURAL OR ARTIFICIAL OPENING ENDOSCOPIC, DIAGNOSTIC: ICD-10-PCS | Performed by: INTERNAL MEDICINE

## 2019-06-27 PROCEDURE — 45380 COLONOSCOPY AND BIOPSY: CPT | Performed by: INTERNAL MEDICINE

## 2019-06-27 DEVICE — DEV CLIP ENDO RESOLUTION360 CONTRL ROT 235CM: Type: IMPLANTABLE DEVICE | Site: DESCENDING COLON | Status: FUNCTIONAL

## 2019-06-27 RX ORDER — PANTOPRAZOLE SODIUM 40 MG/1
40 TABLET, DELAYED RELEASE ORAL
Status: DISCONTINUED | OUTPATIENT
Start: 2019-06-27 | End: 2019-06-28 | Stop reason: HOSPADM

## 2019-06-27 RX ORDER — LIDOCAINE HYDROCHLORIDE 20 MG/ML
INJECTION, SOLUTION INFILTRATION; PERINEURAL AS NEEDED
Status: DISCONTINUED | OUTPATIENT
Start: 2019-06-27 | End: 2019-06-27 | Stop reason: SURG

## 2019-06-27 RX ORDER — POTASSIUM CHLORIDE 750 MG/1
40 CAPSULE, EXTENDED RELEASE ORAL AS NEEDED
Status: CANCELLED | OUTPATIENT
Start: 2019-06-27

## 2019-06-27 RX ORDER — PROPOFOL 10 MG/ML
VIAL (ML) INTRAVENOUS AS NEEDED
Status: DISCONTINUED | OUTPATIENT
Start: 2019-06-27 | End: 2019-06-27 | Stop reason: SURG

## 2019-06-27 RX ORDER — PROPOFOL 10 MG/ML
VIAL (ML) INTRAVENOUS CONTINUOUS PRN
Status: DISCONTINUED | OUTPATIENT
Start: 2019-06-27 | End: 2019-06-27 | Stop reason: SURG

## 2019-06-27 RX ORDER — POTASSIUM CHLORIDE 1.5 G/1.77G
40 POWDER, FOR SOLUTION ORAL AS NEEDED
Status: DISCONTINUED | OUTPATIENT
Start: 2019-06-27 | End: 2019-06-28 | Stop reason: HOSPADM

## 2019-06-27 RX ORDER — SODIUM CHLORIDE, SODIUM LACTATE, POTASSIUM CHLORIDE, CALCIUM CHLORIDE 600; 310; 30; 20 MG/100ML; MG/100ML; MG/100ML; MG/100ML
30 INJECTION, SOLUTION INTRAVENOUS CONTINUOUS PRN
Status: DISCONTINUED | OUTPATIENT
Start: 2019-06-27 | End: 2019-06-28 | Stop reason: HOSPADM

## 2019-06-27 RX ORDER — POTASSIUM CHLORIDE 1.5 G/1.77G
40 POWDER, FOR SOLUTION ORAL AS NEEDED
Status: CANCELLED | OUTPATIENT
Start: 2019-06-27

## 2019-06-27 RX ORDER — POTASSIUM CHLORIDE 750 MG/1
40 CAPSULE, EXTENDED RELEASE ORAL AS NEEDED
Status: DISCONTINUED | OUTPATIENT
Start: 2019-06-27 | End: 2019-06-28 | Stop reason: HOSPADM

## 2019-06-27 RX ADMIN — CALCITRIOL 0.5 MCG: 0.25 CAPSULE ORAL at 20:36

## 2019-06-27 RX ADMIN — SODIUM CHLORIDE 8 MG/HR: 900 INJECTION INTRAVENOUS at 07:21

## 2019-06-27 RX ADMIN — PROPOFOL 100 MCG/KG/MIN: 10 INJECTION, EMULSION INTRAVENOUS at 11:17

## 2019-06-27 RX ADMIN — SODIUM CHLORIDE 8 MG/HR: 900 INJECTION INTRAVENOUS at 14:35

## 2019-06-27 RX ADMIN — SODIUM CHLORIDE 75 ML/HR: 4.5 INJECTION, SOLUTION INTRAVENOUS at 02:41

## 2019-06-27 RX ADMIN — VITAMIN D, TAB 1000IU (100/BT) 2000 UNITS: 25 TAB at 08:21

## 2019-06-27 RX ADMIN — Medication 5 MG: at 22:51

## 2019-06-27 RX ADMIN — PROPOFOL 50 MG: 10 INJECTION, EMULSION INTRAVENOUS at 11:14

## 2019-06-27 RX ADMIN — RALOXIFENE HYDROCHLORIDE 60 MG: 60 TABLET, FILM COATED ORAL at 08:21

## 2019-06-27 RX ADMIN — SODIUM CHLORIDE, POTASSIUM CHLORIDE, SODIUM LACTATE AND CALCIUM CHLORIDE 30 ML/HR: 600; 310; 30; 20 INJECTION, SOLUTION INTRAVENOUS at 10:35

## 2019-06-27 RX ADMIN — SODIUM CHLORIDE 8 MG/HR: 900 INJECTION INTRAVENOUS at 02:41

## 2019-06-27 RX ADMIN — CALCITRIOL 0.5 MCG: 0.25 CAPSULE ORAL at 08:21

## 2019-06-27 RX ADMIN — Medication 250 MG: at 08:21

## 2019-06-27 RX ADMIN — PANTOPRAZOLE SODIUM 40 MG: 40 TABLET, DELAYED RELEASE ORAL at 19:04

## 2019-06-27 RX ADMIN — DOXYCYCLINE 100 MG: 100 CAPSULE ORAL at 08:21

## 2019-06-27 RX ADMIN — POTASSIUM CHLORIDE 40 MEQ: 750 CAPSULE, EXTENDED RELEASE ORAL at 19:04

## 2019-06-27 RX ADMIN — LIDOCAINE HYDROCHLORIDE 100 MG: 20 INJECTION, SOLUTION INFILTRATION; PERINEURAL at 11:14

## 2019-06-27 RX ADMIN — POLYETHYLENE GLYCOL-3350 AND ELECTROLYTES WITH FLAVOR PACK 2000 ML: 240; 5.84; 2.98; 6.72; 22.72 POWDER, FOR SOLUTION ORAL at 04:58

## 2019-06-27 RX ADMIN — SERTRALINE 100 MG: 100 TABLET, FILM COATED ORAL at 08:21

## 2019-06-27 RX ADMIN — LEVOTHYROXINE SODIUM 200 MCG: 100 TABLET ORAL at 05:00

## 2019-06-27 RX ADMIN — PRAVASTATIN SODIUM 40 MG: 40 TABLET ORAL at 20:36

## 2019-06-27 RX ADMIN — DOXYCYCLINE 100 MG: 100 CAPSULE ORAL at 20:35

## 2019-06-27 NOTE — ANESTHESIA POSTPROCEDURE EVALUATION
"Patient: Becky Zavala    Procedure Summary     Date:  06/26/19 Room / Location:   [facility]    Anesthesia Start:  1417 Anesthesia Stop:  1436    Procedures:       ESOPHAGOGASTRODUODENOSCOPY (N/A Esophagus)      COLONOSCOPY (N/A ) Diagnosis:       Upper GI bleed      Gastrointestinal hemorrhage with melena      Anemia, unspecified type      (Upper GI bleed [K92.2])      (Gastrointestinal hemorrhage with melena [K92.1])      (Anemia, unspecified type [D64.9])    Surgeon:  Max Zavaleta MD; Mimi Logan MD Provider:  Becky Fitzgerald MD    Anesthesia Type:  MAC ASA Status:  3          Anesthesia Type: MAC  Last vitals  BP   164/85 (06/27/19 1021)   Temp   36.7 °C (98 °F) (06/27/19 1021)   Pulse   71 (06/27/19 1021)   Resp   16 (06/27/19 1021)     SpO2   100 % (06/27/19 1021)     Post Anesthesia Care and Evaluation    Patient location during evaluation: PACU  Patient participation: complete - patient participated  Level of consciousness: awake  Pain score: 0  Pain management: adequate  Airway patency: patent  Anesthetic complications: No anesthetic complications  PONV Status: none  Cardiovascular status: acceptable  Respiratory status: acceptable  Hydration status: acceptable    Comments: /85 (BP Location: Right arm, Patient Position: Sitting)   Pulse 71   Temp 36.7 °C (98 °F) (Oral)   Resp 16   Ht 172.7 cm (67.99\")   Wt 74.4 kg (164 lb 2 oz)   SpO2 100%   BMI 24.96 kg/m²       "

## 2019-06-27 NOTE — ANESTHESIA POSTPROCEDURE EVALUATION
"Patient: Becky Zavala    Procedure Summary     Date:  06/27/19 Room / Location:   [facility]    Anesthesia Start:  1111 Anesthesia Stop:  1159    Procedure:  COLONOSCOPY into cecum and terminal ileum with cold biopsy polypectomies and cold snare polypectomies, resolution clip x2 (N/A ) Diagnosis:       Anemia, unspecified type      (Anemia, unspecified type [D64.9])    Surgeon:  Mimi Logan MD Provider:  Elliot Harrington MD    Anesthesia Type:  MAC ASA Status:  3          Anesthesia Type: MAC  Last vitals  BP   105/50 (06/27/19 1208)   Temp   36.7 °C (98 °F) (06/27/19 1159)   Pulse   60 (06/27/19 1208)   Resp   12 (06/27/19 1208)     SpO2   97 % (06/27/19 1208)     Post Anesthesia Care and Evaluation    Patient location during evaluation: PACU  Patient participation: complete - patient participated  Level of consciousness: awake  Pain score: 0  Pain management: adequate  Airway patency: patent  Anesthetic complications: No anesthetic complications  PONV Status: none  Cardiovascular status: acceptable  Respiratory status: acceptable  Hydration status: acceptable    Comments: /50   Pulse 60   Temp 36.7 °C (98 °F) (Oral)   Resp 12   Ht 172.7 cm (67.99\")   Wt 74.4 kg (164 lb 2 oz)   SpO2 97%   BMI 24.96 kg/m²       "

## 2019-06-27 NOTE — ANESTHESIA PREPROCEDURE EVALUATION
Anesthesia Evaluation     Patient summary reviewed and Nursing notes reviewed   history of anesthetic complications:               Airway   Mallampati: I  TM distance: >3 FB  Neck ROM: full  No difficulty expected  Dental - normal exam     Pulmonary - negative pulmonary ROS and normal exam   Cardiovascular - normal exam    (+) CAD, dysrhythmias, PVD, hyperlipidemia,       Neuro/Psych  (+) psychiatric history, dementia,     GI/Hepatic/Renal/Endo    (+)  GI bleeding, hypothyroidism,     Musculoskeletal (-) negative ROS    Abdominal  - normal exam    Bowel sounds: normal.   Substance History - negative use     OB/GYN negative ob/gyn ROS         Other                        Anesthesia Plan    ASA 3     MAC     Anesthetic plan, all risks, benefits, and alternatives have been provided, discussed and informed consent has been obtained with: patient.

## 2019-06-28 VITALS
SYSTOLIC BLOOD PRESSURE: 140 MMHG | OXYGEN SATURATION: 99 % | RESPIRATION RATE: 12 BRPM | TEMPERATURE: 98.1 F | DIASTOLIC BLOOD PRESSURE: 72 MMHG | HEART RATE: 66 BPM | BODY MASS INDEX: 24.88 KG/M2 | WEIGHT: 164.13 LBS | HEIGHT: 68 IN

## 2019-06-28 LAB
ALBUMIN SERPL-MCNC: 2.9 G/DL (ref 3.5–5.2)
ALBUMIN/GLOB SERPL: 1.3 G/DL
ALP SERPL-CCNC: 58 U/L (ref 39–117)
ALT SERPL W P-5'-P-CCNC: 12 U/L (ref 1–33)
ANION GAP SERPL CALCULATED.3IONS-SCNC: 11 MMOL/L (ref 5–15)
AST SERPL-CCNC: 13 U/L (ref 1–32)
BASOPHILS # BLD AUTO: 0.02 10*3/MM3 (ref 0–0.2)
BASOPHILS NFR BLD AUTO: 0.3 % (ref 0–1.5)
BILIRUB SERPL-MCNC: 0.3 MG/DL (ref 0.2–1.2)
BUN BLD-MCNC: 7 MG/DL (ref 8–23)
BUN/CREAT SERPL: 11.1 (ref 7–25)
CALCIUM SPEC-SCNC: 7.1 MG/DL (ref 8.6–10.5)
CHLORIDE SERPL-SCNC: 111 MMOL/L (ref 98–107)
CO2 SERPL-SCNC: 26 MMOL/L (ref 22–29)
CREAT BLD-MCNC: 0.63 MG/DL (ref 0.57–1)
CYTO UR: NORMAL
DEPRECATED RDW RBC AUTO: 40.6 FL (ref 37–54)
EOSINOPHIL # BLD AUTO: 0.12 10*3/MM3 (ref 0–0.4)
EOSINOPHIL NFR BLD AUTO: 2 % (ref 0.3–6.2)
ERYTHROCYTE [DISTWIDTH] IN BLOOD BY AUTOMATED COUNT: 12.8 % (ref 12.3–15.4)
GFR SERPL CREATININE-BSD FRML MDRD: 90 ML/MIN/1.73
GLOBULIN UR ELPH-MCNC: 2.3 GM/DL
GLUCOSE BLD-MCNC: 109 MG/DL (ref 65–99)
HCT VFR BLD AUTO: 30.8 % (ref 34–46.6)
HGB BLD-MCNC: 9.8 G/DL (ref 12–15.9)
IMM GRANULOCYTES # BLD AUTO: 0.09 10*3/MM3 (ref 0–0.05)
IMM GRANULOCYTES NFR BLD AUTO: 1.5 % (ref 0–0.5)
LAB AP CASE REPORT: NORMAL
LAB AP DIAGNOSIS COMMENT: NORMAL
LYMPHOCYTES # BLD AUTO: 1.4 10*3/MM3 (ref 0.7–3.1)
LYMPHOCYTES NFR BLD AUTO: 23.6 % (ref 19.6–45.3)
MAGNESIUM SERPL-MCNC: 1.7 MG/DL (ref 1.6–2.4)
MCH RBC QN AUTO: 27.8 PG (ref 26.6–33)
MCHC RBC AUTO-ENTMCNC: 31.8 G/DL (ref 31.5–35.7)
MCV RBC AUTO: 87.5 FL (ref 79–97)
MONOCYTES # BLD AUTO: 0.42 10*3/MM3 (ref 0.1–0.9)
MONOCYTES NFR BLD AUTO: 7.1 % (ref 5–12)
NEUTROPHILS # BLD AUTO: 3.89 10*3/MM3 (ref 1.7–7)
NEUTROPHILS NFR BLD AUTO: 65.5 % (ref 42.7–76)
NRBC BLD AUTO-RTO: 0 /100 WBC (ref 0–0.2)
PATH REPORT.FINAL DX SPEC: NORMAL
PATH REPORT.GROSS SPEC: NORMAL
PLATELET # BLD AUTO: 155 10*3/MM3 (ref 140–450)
PMV BLD AUTO: 11 FL (ref 6–12)
POTASSIUM BLD-SCNC: 3.2 MMOL/L (ref 3.5–5.2)
PROT SERPL-MCNC: 5.2 G/DL (ref 6–8.5)
RBC # BLD AUTO: 3.52 10*6/MM3 (ref 3.77–5.28)
SODIUM BLD-SCNC: 148 MMOL/L (ref 136–145)
WBC NRBC COR # BLD: 5.94 10*3/MM3 (ref 3.4–10.8)

## 2019-06-28 PROCEDURE — 80053 COMPREHEN METABOLIC PANEL: CPT | Performed by: HOSPITALIST

## 2019-06-28 PROCEDURE — 85025 COMPLETE CBC W/AUTO DIFF WBC: CPT | Performed by: HOSPITALIST

## 2019-06-28 PROCEDURE — 83735 ASSAY OF MAGNESIUM: CPT | Performed by: HOSPITALIST

## 2019-06-28 PROCEDURE — 99232 SBSQ HOSP IP/OBS MODERATE 35: CPT | Performed by: INTERNAL MEDICINE

## 2019-06-28 RX ORDER — PANTOPRAZOLE SODIUM 40 MG/1
40 TABLET, DELAYED RELEASE ORAL
Qty: 60 TABLET | Refills: 0 | Status: SHIPPED | OUTPATIENT
Start: 2019-06-28 | End: 2020-08-14

## 2019-06-28 RX ADMIN — VITAMIN D, TAB 1000IU (100/BT) 2000 UNITS: 25 TAB at 08:57

## 2019-06-28 RX ADMIN — POTASSIUM CHLORIDE 40 MEQ: 750 CAPSULE, EXTENDED RELEASE ORAL at 09:00

## 2019-06-28 RX ADMIN — SERTRALINE 100 MG: 100 TABLET, FILM COATED ORAL at 08:57

## 2019-06-28 RX ADMIN — CALCITRIOL 0.5 MCG: 0.25 CAPSULE ORAL at 08:58

## 2019-06-28 RX ADMIN — DOXYCYCLINE 100 MG: 100 CAPSULE ORAL at 08:57

## 2019-06-28 RX ADMIN — LEVOTHYROXINE SODIUM 200 MCG: 100 TABLET ORAL at 06:23

## 2019-06-28 RX ADMIN — PANTOPRAZOLE SODIUM 40 MG: 40 TABLET, DELAYED RELEASE ORAL at 06:23

## 2019-06-28 RX ADMIN — Medication 250 MG: at 08:58

## 2019-06-28 RX ADMIN — RALOXIFENE HYDROCHLORIDE 60 MG: 60 TABLET, FILM COATED ORAL at 08:58

## 2019-06-29 ENCOUNTER — READMISSION MANAGEMENT (OUTPATIENT)
Dept: CALL CENTER | Facility: HOSPITAL | Age: 84
End: 2019-06-29

## 2019-07-01 ENCOUNTER — TELEPHONE (OUTPATIENT)
Dept: GASTROENTEROLOGY | Facility: CLINIC | Age: 84
End: 2019-07-01

## 2019-07-01 NOTE — TELEPHONE ENCOUNTER
----- Message from Mimi Logan MD sent at 7/1/2019  3:05 PM EDT -----  All polyps removed during her colonoscopy were benign.

## 2019-07-01 NOTE — TELEPHONE ENCOUNTER
Call to daughter, Aimee (see hipaa).  Advise per Dr Logan that all polyps removed during c/s were benign.  Verb understanding.

## 2019-07-02 ENCOUNTER — READMISSION MANAGEMENT (OUTPATIENT)
Dept: CALL CENTER | Facility: HOSPITAL | Age: 84
End: 2019-07-02

## 2019-07-02 NOTE — OUTREACH NOTE
Medical Week 1 Survey      Responses   Facility patient discharged from?  [facility]   Does the patient have one of the following disease processes/diagnoses(primary or secondary)?  Other   Is there a successful TCM telephone encounter documented?  No   Week 1 attempt successful?  Yes   Call start time  1112   Call end time  1120   Discharge diagnosis  Gastrointestinal hemorrhage with melena ,  Acute posthemorrhagic anemia    Is patient permission given to speak with other caregiver?  Yes   List who call center can speak with  daughter   Person spoke with today (if not patient) and relationship  daughter   Meds reviewed with patient/caregiver?  Yes   Is the patient having any side effects they believe may be caused by any medication additions or changes?  No   Does the patient have all medications ordered at discharge?  Yes   Is the patient taking all medications as directed (includes completed medication regime)?  Yes   Does the patient have a primary care provider?   Yes   Does the patient have an appointment with their PCP within 7 days of discharge?  Yes   Has the patient kept scheduled appointments due by today?  N/A   Has home health visited the patient within 72 hours of discharge?  N/A   Psychosocial issues?  No   Did the patient receive a copy of their discharge instructions?  Yes   Nursing interventions  Reviewed instructions with patient   What is the patient's perception of their health status since discharge?  Same   Is the patient/caregiver able to teach back signs and symptoms related to disease process for when to call PCP?  Yes   Is the patient/caregiver able to teach back signs and symptoms related to disease process for when to call 911?  Yes   Is the patient/caregiver able to teach back the hierarchy of who to call/visit for symptoms/problems? PCP, Specialist, Home health nurse, Urgent Care, ED, 911  Yes   Additional teach back comments  Daughter states that she had several episodes of vomiting  on Sunday but none since then.  No blood noted in vomit.  She has had several episodes of diarrhea, no blood noted.   Week 1 call completed?  Yes          Sylvia Kramer RN

## 2019-07-03 ENCOUNTER — TELEPHONE (OUTPATIENT)
Dept: INFECTIOUS DISEASES | Facility: CLINIC | Age: 84
End: 2019-07-03

## 2019-07-03 NOTE — TELEPHONE ENCOUNTER
Pts daughter states that the patient has an appt on 07/26 @ 1:30 here at the hospital and is wondering if Dr. Bangura could possibly see the patient that day since they are driving a long distance?

## 2019-07-08 ENCOUNTER — OFFICE VISIT CONVERTED (OUTPATIENT)
Dept: FAMILY MEDICINE CLINIC | Age: 84
End: 2019-07-08
Attending: FAMILY MEDICINE

## 2019-07-08 ENCOUNTER — HOSPITAL ENCOUNTER (OUTPATIENT)
Dept: OTHER | Facility: HOSPITAL | Age: 84
Discharge: HOME | End: 2019-07-08
Attending: FAMILY MEDICINE

## 2019-07-08 LAB
ALBUMIN SERPL-MCNC: 3.6 G/DL (ref 3.5–5)
ALBUMIN/GLOB SERPL: 1.3 {RATIO} (ref 1.4–2.6)
ALP SERPL-CCNC: 76 U/L (ref 43–160)
ALT SERPL-CCNC: 13 U/L (ref 10–40)
ANION GAP SERPL CALC-SCNC: 19 MMOL/L (ref 8–19)
AST SERPL-CCNC: 16 U/L (ref 15–50)
BILIRUB SERPL-MCNC: 0.65 MG/DL (ref 0.2–1.3)
BUN SERPL-MCNC: 13 MG/DL (ref 5–25)
BUN/CREAT SERPL: 13 {RATIO} (ref 6–20)
CALCIUM SERPL-MCNC: 9.3 MG/DL (ref 8.7–10.4)
CHLORIDE SERPL-SCNC: 102 MMOL/L (ref 99–111)
CONV CO2: 28 MMOL/L (ref 22–32)
CONV TOTAL PROTEIN: 6.3 G/DL (ref 6.3–8.2)
CREAT UR-MCNC: 0.97 MG/DL (ref 0.5–0.9)
ERYTHROCYTE [DISTWIDTH] IN BLOOD BY AUTOMATED COUNT: 13.4 % (ref 11.5–14.5)
GFR SERPLBLD BASED ON 1.73 SQ M-ARVRAT: 53 ML/MIN/{1.73_M2}
GLOBULIN UR ELPH-MCNC: 2.7 G/DL (ref 2–3.5)
GLUCOSE SERPL-MCNC: 117 MG/DL (ref 65–99)
HBA1C MFR BLD: 11.4 G/DL (ref 12–16)
HCT VFR BLD AUTO: 34.6 % (ref 37–47)
MCH RBC QN AUTO: 28.5 PG (ref 27–31)
MCHC RBC AUTO-ENTMCNC: 32.9 G/DL (ref 33–37)
MCV RBC AUTO: 86.5 FL (ref 81–99)
OSMOLALITY SERPL CALC.SUM OF ELEC: 299 MOSM/KG (ref 273–304)
PLATELET # BLD AUTO: 242 10*3/UL (ref 130–400)
PMV BLD AUTO: 11 FL (ref 7.4–10.4)
POTASSIUM SERPL-SCNC: 4.5 MMOL/L (ref 3.5–5.3)
PTH-INTACT SERPL-MCNC: <5.5 PG/ML (ref 11.1–79.5)
RBC # BLD AUTO: 4 10*6/UL (ref 4.2–5.4)
SODIUM SERPL-SCNC: 144 MMOL/L (ref 135–147)
TSH SERPL-ACNC: 1.14 M[IU]/L (ref 0.27–4.2)
WBC # BLD AUTO: 6.26 10*3/UL (ref 4.8–10.8)

## 2019-07-10 ENCOUNTER — READMISSION MANAGEMENT (OUTPATIENT)
Dept: CALL CENTER | Facility: HOSPITAL | Age: 84
End: 2019-07-10

## 2019-07-10 NOTE — OUTREACH NOTE
Medical Week 2 Survey      Responses   Facility patient discharged from?  [facility]   Does the patient have one of the following disease processes/diagnoses(primary or secondary)?  Other   Week 2 attempt successful?  No   Unsuccessful attempts  Attempt 1          Karen Turk RN

## 2019-07-11 ENCOUNTER — READMISSION MANAGEMENT (OUTPATIENT)
Dept: CALL CENTER | Facility: HOSPITAL | Age: 84
End: 2019-07-11

## 2019-07-11 NOTE — OUTREACH NOTE
Medical Week 2 Survey      Responses   Facility patient discharged from?  [facility]   Does the patient have one of the following disease processes/diagnoses(primary or secondary)?  Other   Week 2 attempt successful?  No   Unsuccessful attempts  Attempt 2          Becky Lantigua RN

## 2019-07-26 ENCOUNTER — OFFICE VISIT (OUTPATIENT)
Dept: INFECTIOUS DISEASES | Facility: CLINIC | Age: 84
End: 2019-07-26

## 2019-07-26 ENCOUNTER — OFFICE VISIT (OUTPATIENT)
Dept: GASTROENTEROLOGY | Facility: CLINIC | Age: 84
End: 2019-07-26

## 2019-07-26 VITALS
BODY MASS INDEX: 23.37 KG/M2 | WEIGHT: 157.8 LBS | RESPIRATION RATE: 14 BRPM | HEART RATE: 81 BPM | TEMPERATURE: 98 F | DIASTOLIC BLOOD PRESSURE: 80 MMHG | SYSTOLIC BLOOD PRESSURE: 159 MMHG | HEIGHT: 69 IN

## 2019-07-26 VITALS
HEIGHT: 69 IN | WEIGHT: 158 LBS | TEMPERATURE: 98.5 F | SYSTOLIC BLOOD PRESSURE: 116 MMHG | BODY MASS INDEX: 23.4 KG/M2 | DIASTOLIC BLOOD PRESSURE: 76 MMHG

## 2019-07-26 DIAGNOSIS — I72.9 MYCOTIC ANEURYSM (HCC): ICD-10-CM

## 2019-07-26 DIAGNOSIS — Z79.2 LONG TERM (CURRENT) USE OF ANTIBIOTICS: ICD-10-CM

## 2019-07-26 DIAGNOSIS — I48.0 PAROXYSMAL A-FIB (HCC): ICD-10-CM

## 2019-07-26 DIAGNOSIS — A49.01 MSSA (METHICILLIN SUSCEPTIBLE STAPHYLOCOCCUS AUREUS) INFECTION: Primary | ICD-10-CM

## 2019-07-26 DIAGNOSIS — D62 ACUTE BLOOD LOSS ANEMIA: ICD-10-CM

## 2019-07-26 DIAGNOSIS — K92.1 GASTROINTESTINAL HEMORRHAGE WITH MELENA: Primary | ICD-10-CM

## 2019-07-26 PROCEDURE — 99213 OFFICE O/P EST LOW 20 MIN: CPT | Performed by: INTERNAL MEDICINE

## 2019-07-26 PROCEDURE — 99214 OFFICE O/P EST MOD 30 MIN: CPT | Performed by: NURSE PRACTITIONER

## 2019-07-26 RX ORDER — DOXYCYCLINE HYCLATE 100 MG/1
100 CAPSULE ORAL 2 TIMES DAILY
Qty: 180 CAPSULE | Refills: 3 | Status: SHIPPED | OUTPATIENT
Start: 2019-07-26 | End: 2019-10-24

## 2019-07-26 NOTE — PROGRESS NOTES
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 She remains on doxycycline suppression without side effects. She says her surgical incisions are healed. There is no erythema or drainage. No fevers, chills, or sweats. No further surgical interventions. She was admitted last month for a GI bleed which is now resolved. She sees Dr Ott again in August to follow-up for her vascular needs. She was temporarily off of Xarelto but is now back on it.     Review of Systems: no n/v/d      Medications:   Current Outpatient Medications:   •  acetaminophen (TYLENOL) 325 MG tablet, Take 650 mg by mouth Every 6 (Six) Hours As Needed for mild pain (1-3)., Disp: , Rfl:   •  calcitriol (ROCALTROL) 0.5 MCG capsule, Take 0.5 mcg by mouth 2 (Two) Times a Day., Disp: , Rfl:   •  cholecalciferol (VITAMIN D3) 1000 UNITS tablet, Take 2,000 Units by mouth Daily. HOLD PRIOR TO SURG, Disp: , Rfl:   •  diltiazem (CARDIZEM) 120 MG tablet, Take 120 mg by mouth Daily., Disp: , Rfl:   •  doxycycline (VIBRAMYCIN) 100 MG capsule, Take 1 capsule by mouth 2 (Two) Times a Day for 90 days., Disp: 180 capsule, Rfl: 3  •  levothyroxine (SYNTHROID, LEVOTHROID) 150 MCG tablet, Take 200 mcg by mouth Every Morning., Disp: , Rfl:   •  Multiple Vitamins-Minerals (ICAPS AREDS 2 PO), Take 1 capsule by mouth 2 (Two) Times a Day., Disp: , Rfl:   •  pantoprazole (PROTONIX) 40 MG EC tablet, Take 1 tablet by mouth 2 (Two) Times a Day Before Meals., Disp: 60 tablet, Rfl: 0  •  potassium chloride (K-DUR,KLOR-CON) 20 MEQ CR tablet, Take 20 mEq by mouth 2 (Two) Times a Day., Disp: , Rfl:   •  pravastatin (PRAVACHOL) 40 MG tablet, Take 40 mg by mouth Daily., Disp: , Rfl:   •  raloxifene (EVISTA) 60 MG tablet, Take 60 mg by mouth Every Morning., Disp: , Rfl:   •  saccharomyces boulardii (FLORASTOR) 250 MG capsule, Take 250 mg by mouth Daily.,  "Disp: , Rfl:   •  sertraline (ZOLOFT) 100 MG tablet, Take 100 mg by mouth Daily., Disp: , Rfl:     OBJECTIVE:  /80   Pulse 81   Temp 98 °F (36.7 °C)   Resp 14   Ht 174 cm (68.5\")   Wt 71.6 kg (157 lb 12.8 oz)   BMI 23.64 kg/m²     General: awake, alert, NAD  Head: Normocephalic  Eyes: no scleral icterus  ENT: MMM,  Dentures.   Neck: Supple  Cardiovascular: NR, trace pitting LLE edema  Respiratory: normal work of breathing on ambient air  Skin: No rashes, lesions, or embolic phenomenon;  incisions are healed; no erythema or drainage  Psychiatric: Normal mood and affect     DIAGNOSTICS:  CBC, CMP reviewed today  Lab Results   Component Value Date    WBC 5.94 06/28/2019    HGB 9.8 (L) 06/28/2019    HCT 30.8 (L) 06/28/2019    MCV 87.5 06/28/2019     06/28/2019     Lab Results   Component Value Date    GLUCOSE 109 (H) 06/28/2019    BUN 7 (L) 06/28/2019    CREATININE 0.63 06/28/2019    EGFRIFNONA 90 06/28/2019    BCR 11.1 06/28/2019    K 3.2 (L) 06/28/2019    CO2 26.0 06/28/2019    CALCIUM 7.1 (L) 06/28/2019    ALBUMIN 2.90 (L) 06/28/2019    AST 13 06/28/2019    ALT 12 06/28/2019     Lab Results   Component Value Date    CRP 0.12 06/04/2018     Microbiology:  10/2 BCx: negative  10/3 BCx: negative  10/6 BCx: negative  10/7 Tissue Cx Femoral Artery: MSSA (also sensitive to doxy, bactrim; R - clinda)      Radiology (personally reviewed):  No new imaging    ASSESSMENT/PLAN:  1. 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 Long term use " of antibiotics     RTC 12 months or sooner if needed

## 2019-07-27 LAB
BASOPHILS # BLD AUTO: 0.03 10*3/MM3 (ref 0–0.2)
BASOPHILS NFR BLD AUTO: 0.6 % (ref 0–1.5)
EOSINOPHIL # BLD AUTO: 0.08 10*3/MM3 (ref 0–0.4)
EOSINOPHIL NFR BLD AUTO: 1.5 % (ref 0.3–6.2)
ERYTHROCYTE [DISTWIDTH] IN BLOOD BY AUTOMATED COUNT: 13.8 % (ref 12.3–15.4)
HCT VFR BLD AUTO: 38.4 % (ref 34–46.6)
HGB BLD-MCNC: 12 G/DL (ref 12–15.9)
IMM GRANULOCYTES # BLD AUTO: 0.02 10*3/MM3 (ref 0–0.05)
IMM GRANULOCYTES NFR BLD AUTO: 0.4 % (ref 0–0.5)
LYMPHOCYTES # BLD AUTO: 1.49 10*3/MM3 (ref 0.7–3.1)
LYMPHOCYTES NFR BLD AUTO: 28.5 % (ref 19.6–45.3)
MCH RBC QN AUTO: 28.5 PG (ref 26.6–33)
MCHC RBC AUTO-ENTMCNC: 31.3 G/DL (ref 31.5–35.7)
MCV RBC AUTO: 91.2 FL (ref 79–97)
MONOCYTES # BLD AUTO: 0.6 10*3/MM3 (ref 0.1–0.9)
MONOCYTES NFR BLD AUTO: 11.5 % (ref 5–12)
NEUTROPHILS # BLD AUTO: 3.01 10*3/MM3 (ref 1.7–7)
NEUTROPHILS NFR BLD AUTO: 57.5 % (ref 42.7–76)
NRBC BLD AUTO-RTO: 0.2 /100 WBC (ref 0–0.2)
PLATELET # BLD AUTO: 111 10*3/MM3 (ref 140–450)
RBC # BLD AUTO: 4.21 10*6/MM3 (ref 3.77–5.28)
WBC # BLD AUTO: 5.23 10*3/MM3 (ref 3.4–10.8)

## 2019-07-29 ENCOUNTER — TELEPHONE (OUTPATIENT)
Dept: GASTROENTEROLOGY | Facility: CLINIC | Age: 84
End: 2019-07-29

## 2019-07-29 NOTE — TELEPHONE ENCOUNTER
Called pt and spoke with pt' s daughter with pt's permission and advised per Lindy that her mother's hgb was much better at 12.0 from 9.8.  This is great! All other labs were good. Pt's daughter verb understanding.

## 2019-07-29 NOTE — TELEPHONE ENCOUNTER
----- Message from PEARL Chacon sent at 7/29/2019  8:35 AM EDT -----  Please call patient and let her know the Hgb was much better at 12.0 from 9.8. This is great! All other labs were good. Thanks.

## 2019-09-26 RX ORDER — DOXYCYCLINE HYCLATE 100 MG/1
CAPSULE ORAL
Qty: 180 CAPSULE | Refills: 1 | Status: SHIPPED | OUTPATIENT
Start: 2019-09-26 | End: 2020-08-14 | Stop reason: SDUPTHER

## 2020-01-13 ENCOUNTER — OFFICE VISIT CONVERTED (OUTPATIENT)
Dept: FAMILY MEDICINE CLINIC | Age: 85
End: 2020-01-13
Attending: FAMILY MEDICINE

## 2020-01-13 ENCOUNTER — HOSPITAL ENCOUNTER (OUTPATIENT)
Dept: OTHER | Facility: HOSPITAL | Age: 85
Discharge: HOME | End: 2020-01-13
Attending: FAMILY MEDICINE

## 2020-01-13 LAB
ALBUMIN SERPL-MCNC: 4 G/DL (ref 3.5–5)
ALBUMIN/GLOB SERPL: 1.7 {RATIO} (ref 1.4–2.6)
ALP SERPL-CCNC: 61 U/L (ref 43–160)
ALT SERPL-CCNC: 14 U/L (ref 10–40)
ANION GAP SERPL CALC-SCNC: 18 MMOL/L (ref 8–19)
AST SERPL-CCNC: 19 U/L (ref 15–50)
BILIRUB SERPL-MCNC: 0.67 MG/DL (ref 0.2–1.3)
BUN SERPL-MCNC: 21 MG/DL (ref 5–25)
BUN/CREAT SERPL: 23 {RATIO} (ref 6–20)
CALCIUM SERPL-MCNC: 8.5 MG/DL (ref 8.7–10.4)
CHLORIDE SERPL-SCNC: 105 MMOL/L (ref 99–111)
CONV CO2: 25 MMOL/L (ref 22–32)
CONV TOTAL PROTEIN: 6.4 G/DL (ref 6.3–8.2)
CREAT UR-MCNC: 0.93 MG/DL (ref 0.5–0.9)
ERYTHROCYTE [DISTWIDTH] IN BLOOD BY AUTOMATED COUNT: 13.1 % (ref 11.7–14.4)
FOLATE SERPL-MCNC: 18.4 NG/ML (ref 4.8–20)
GFR SERPLBLD BASED ON 1.73 SQ M-ARVRAT: 56 ML/MIN/{1.73_M2}
GLOBULIN UR ELPH-MCNC: 2.4 G/DL (ref 2–3.5)
GLUCOSE SERPL-MCNC: 114 MG/DL (ref 65–99)
HCT VFR BLD AUTO: 41.1 % (ref 37–47)
HGB BLD-MCNC: 13.4 G/DL (ref 12–16)
MCH RBC QN AUTO: 28.3 PG (ref 27–31)
MCHC RBC AUTO-ENTMCNC: 32.6 G/DL (ref 33–37)
MCV RBC AUTO: 86.7 FL (ref 81–99)
OSMOLALITY SERPL CALC.SUM OF ELEC: 302 MOSM/KG (ref 273–304)
PLATELET # BLD AUTO: 154 10*3/UL (ref 130–400)
PMV BLD AUTO: 11.3 FL (ref 9.4–12.3)
POTASSIUM SERPL-SCNC: 4.3 MMOL/L (ref 3.5–5.3)
RBC # BLD AUTO: 4.74 10*6/UL (ref 4.2–5.4)
SODIUM SERPL-SCNC: 144 MMOL/L (ref 135–147)
TSH SERPL-ACNC: 0.48 M[IU]/L (ref 0.27–4.2)
VIT B12 SERPL-MCNC: 837 PG/ML (ref 211–911)
WBC # BLD AUTO: 6.74 10*3/UL (ref 4.8–10.8)

## 2020-01-14 LAB
EST. AVERAGE GLUCOSE BLD GHB EST-MCNC: 108 MG/DL
HBA1C MFR BLD: 5.4 % (ref 3.5–5.7)
PTH-INTACT SERPL-MCNC: 7.6 PG/ML (ref 11.1–79.5)

## 2020-08-07 ENCOUNTER — OFFICE VISIT CONVERTED (OUTPATIENT)
Dept: FAMILY MEDICINE CLINIC | Age: 85
End: 2020-08-07
Attending: FAMILY MEDICINE

## 2020-08-07 ENCOUNTER — HOSPITAL ENCOUNTER (OUTPATIENT)
Dept: OTHER | Facility: HOSPITAL | Age: 85
Discharge: HOME | End: 2020-08-07
Attending: FAMILY MEDICINE

## 2020-08-07 LAB — PTH-INTACT SERPL-MCNC: <5.5 PG/ML (ref 11.1–79.5)

## 2020-08-08 LAB
ALBUMIN SERPL-MCNC: 3.8 G/DL (ref 3.5–5)
ALBUMIN/GLOB SERPL: 1.4 {RATIO} (ref 1.4–2.6)
ALP SERPL-CCNC: 63 U/L (ref 43–160)
ALT SERPL-CCNC: 11 U/L (ref 10–40)
ANION GAP SERPL CALC-SCNC: 16 MMOL/L (ref 8–19)
AST SERPL-CCNC: 16 U/L (ref 15–50)
BILIRUB SERPL-MCNC: 0.72 MG/DL (ref 0.2–1.3)
BUN SERPL-MCNC: 23 MG/DL (ref 5–25)
BUN/CREAT SERPL: 21 {RATIO} (ref 6–20)
CALCIUM SERPL-MCNC: 10.7 MG/DL (ref 8.7–10.4)
CHLORIDE SERPL-SCNC: 102 MMOL/L (ref 99–111)
CK SERPL-CCNC: 29 U/L (ref 35–230)
CONV CO2: 29 MMOL/L (ref 22–32)
CONV TOTAL PROTEIN: 6.5 G/DL (ref 6.3–8.2)
CREAT UR-MCNC: 1.11 MG/DL (ref 0.5–0.9)
ERYTHROCYTE [DISTWIDTH] IN BLOOD BY AUTOMATED COUNT: 12.8 % (ref 11.5–14.5)
FOLATE SERPL-MCNC: 13.2 NG/ML (ref 4.8–20)
GFR SERPLBLD BASED ON 1.73 SQ M-ARVRAT: 45 ML/MIN/{1.73_M2}
GLOBULIN UR ELPH-MCNC: 2.7 G/DL (ref 2–3.5)
GLUCOSE SERPL-MCNC: 122 MG/DL (ref 65–99)
HBA1C MFR BLD: 13.6 G/DL (ref 12–16)
HCT VFR BLD AUTO: 41.3 % (ref 37–47)
MAGNESIUM SERPL-MCNC: 1.78 MG/DL (ref 1.6–2.3)
MCH RBC QN AUTO: 28.7 PG (ref 27–31)
MCHC RBC AUTO-ENTMCNC: 32.9 G/DL (ref 33–37)
MCV RBC AUTO: 87.1 FL (ref 81–99)
OSMOLALITY SERPL CALC.SUM OF ELEC: 299 MOSM/KG (ref 273–304)
PLATELET # BLD AUTO: 132 10*3/UL (ref 130–400)
PMV BLD AUTO: 11.6 FL (ref 7.4–10.4)
POTASSIUM SERPL-SCNC: 4.7 MMOL/L (ref 3.5–5.3)
RBC # BLD AUTO: 4.74 10*6/UL (ref 4.2–5.4)
SODIUM SERPL-SCNC: 142 MMOL/L (ref 135–147)
T4 FREE SERPL-MCNC: 1.7 NG/DL (ref 0.9–1.8)
TSH SERPL-ACNC: 0.19 M[IU]/L (ref 0.27–4.2)
VIT B12 SERPL-MCNC: 879 PG/ML (ref 211–911)
WBC # BLD AUTO: 6.83 10*3/UL (ref 4.8–10.8)

## 2020-08-14 ENCOUNTER — OFFICE VISIT (OUTPATIENT)
Dept: INFECTIOUS DISEASES | Facility: CLINIC | Age: 85
End: 2020-08-14

## 2020-08-14 VITALS
HEART RATE: 73 BPM | HEIGHT: 68 IN | DIASTOLIC BLOOD PRESSURE: 84 MMHG | BODY MASS INDEX: 23.86 KG/M2 | TEMPERATURE: 98.7 F | SYSTOLIC BLOOD PRESSURE: 172 MMHG | WEIGHT: 157.4 LBS | RESPIRATION RATE: 12 BRPM

## 2020-08-14 DIAGNOSIS — Z79.2 LONG TERM (CURRENT) USE OF ANTIBIOTICS: ICD-10-CM

## 2020-08-14 DIAGNOSIS — A49.01 MSSA (METHICILLIN SUSCEPTIBLE STAPHYLOCOCCUS AUREUS) INFECTION: Primary | ICD-10-CM

## 2020-08-14 DIAGNOSIS — I72.9 MYCOTIC ANEURYSM (HCC): ICD-10-CM

## 2020-08-14 PROCEDURE — 99213 OFFICE O/P EST LOW 20 MIN: CPT | Performed by: INTERNAL MEDICINE

## 2020-08-14 RX ORDER — DOXYCYCLINE HYCLATE 100 MG/1
100 CAPSULE ORAL 2 TIMES DAILY
Qty: 60 CAPSULE | Refills: 11 | Status: SHIPPED | OUTPATIENT
Start: 2020-08-14 | End: 2020-09-13

## 2020-08-14 NOTE — PROGRESS NOTES
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 She remains on doxycycline suppression without side effects. She says her surgical incisions are healed. There is no erythema or drainage. No fevers, chills, or sweats. She continues to follow with Dr Ott for her vascular needs. She sees him again in November. She had labs done w/ her PCP last week and brought a copy of those for me to review.     Review of Systems: no n/v/d      Medications:     Current Outpatient Medications:   •  acetaminophen (TYLENOL) 325 MG tablet, Take 650 mg by mouth Every 6 (Six) Hours As Needed for mild pain (1-3)., Disp: , Rfl:   •  calcitriol (ROCALTROL) 0.5 MCG capsule, Take 0.5 mcg by mouth 2 (Two) Times a Day., Disp: , Rfl:   •  cholecalciferol (VITAMIN D3) 1000 UNITS tablet, Take 2,000 Units by mouth Daily. HOLD PRIOR TO SURG, Disp: , Rfl:   •  diltiazem (CARDIZEM) 120 MG tablet, Take 120 mg by mouth Daily., Disp: , Rfl:   •  doxycycline (VIBRAMYCIN) 100 MG capsule, TAKE 1 CAPSULE TWICE DAILY, Disp: 180 capsule, Rfl: 1  •  levothyroxine (SYNTHROID, LEVOTHROID) 150 MCG tablet, Take 200 mcg by mouth Every Morning., Disp: , Rfl:   •  Multiple Vitamins-Minerals (ICAPS AREDS 2 PO), Take 1 capsule by mouth 2 (Two) Times a Day., Disp: , Rfl:   •  potassium chloride (K-DUR,KLOR-CON) 20 MEQ CR tablet, Take 20 mEq by mouth 2 (Two) Times a Day., Disp: , Rfl:   •  pravastatin (PRAVACHOL) 40 MG tablet, Take 40 mg by mouth Daily., Disp: , Rfl:   •  raloxifene (EVISTA) 60 MG tablet, Take 60 mg by mouth Every Morning., Disp: , Rfl:   •  sertraline (ZOLOFT) 100 MG tablet, Take 100 mg by mouth Daily., Disp: , Rfl:       OBJECTIVE:  /84   Pulse 73   Temp 98.7 °F (37.1 °C)   Resp 12   Ht 172.7 cm (68\")   Wt 71.4 kg (157 lb 6.4 oz)   BMI 23.93 kg/m²       General: awake, alert, NAD, very nice, hard of " hearing  Eyes: no scleral icterus  ENT: wearing mask  Cardiovascular: NR  Respiratory: normal work of breathing on ambient air  Skin: No rash  Psychiatric: Normal mood and affect     DIAGNOSTICS:  Labs done w/ PCP 8/7/20  Crt 1.11  ALT 11  AST 16  K 4.7  WBC 6.8  Plt 32    Microbiology:  10/2/16 BCx: negative  10/3/16 BCx: negative  10/6/16 BCx: negative  10/7/16 Tissue Cx Femoral Artery: MSSA (also sensitive to doxy, bactrim; R - clinda)  12/9/16 Wound Cx: negative    New Radiology:    none    ASSESSMENT/PLAN:  1. 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 Long term use of antibiotics   -no labs today since done w/ PCP (see above)    RTC 12 months; will try to coordinate with her visit with Dr Ott since she drives in from [place], [place]

## 2020-11-13 ENCOUNTER — HOSPITAL ENCOUNTER (OUTPATIENT)
Dept: OTHER | Facility: HOSPITAL | Age: 85
Discharge: HOME | End: 2020-11-13
Attending: FAMILY MEDICINE

## 2020-11-13 ENCOUNTER — OFFICE VISIT CONVERTED (OUTPATIENT)
Dept: FAMILY MEDICINE CLINIC | Age: 85
End: 2020-11-13
Attending: FAMILY MEDICINE

## 2020-11-13 LAB
ANION GAP SERPL CALC-SCNC: 13 MMOL/L (ref 8–19)
BUN SERPL-MCNC: 18 MG/DL (ref 5–25)
BUN/CREAT SERPL: 20 {RATIO} (ref 6–20)
CALCIUM SERPL-MCNC: 7.2 MG/DL (ref 8.7–10.4)
CHLORIDE SERPL-SCNC: 104 MMOL/L (ref 99–111)
CONV CO2: 28 MMOL/L (ref 22–32)
CREAT UR-MCNC: 0.9 MG/DL (ref 0.5–0.9)
GFR SERPLBLD BASED ON 1.73 SQ M-ARVRAT: 58 ML/MIN/{1.73_M2}
GLUCOSE SERPL-MCNC: 109 MG/DL (ref 65–99)
OSMOLALITY SERPL CALC.SUM OF ELEC: 294 MOSM/KG (ref 273–304)
POTASSIUM SERPL-SCNC: 4.3 MMOL/L (ref 3.5–5.3)
SODIUM SERPL-SCNC: 141 MMOL/L (ref 135–147)

## 2020-11-14 LAB — TSH SERPL-ACNC: 0.48 M[IU]/L (ref 0.27–4.2)

## 2021-04-03 ENCOUNTER — OFFICE VISIT CONVERTED (OUTPATIENT)
Dept: FAMILY MEDICINE CLINIC | Age: 86
End: 2021-04-03
Attending: NURSE PRACTITIONER

## 2021-05-18 NOTE — PROGRESS NOTES
Becky ZavalaSelene 1934     Office/Outpatient Visit    Visit Date: Tue, Mar 20, 2018 02:53 pm    Provider: Darline Washington N.P. (Assistant: Liz Daley MA)    Location: [facility]        Electronically signed by Darline Washington N.P. on  03/20/2018 05:52:33 PM                             SUBJECTIVE:        CC:     Trinity is a 83 year old White female.  right foot swelling;         HPI:         Trinity presents with foot pain.  Other details: Pt states pain x 1 month. States last saturday the pain was worse with some swelling. Denies injury. However, daughter is with her stating she has dementia and it is possible that she fell..      ROS:     CONSTITUTIONAL:  Negative for chills, fatigue, fever, and weight change.      EYES:  Negative for blurred vision.      CARDIOVASCULAR:  Negative for chest pain, orthopnea, paroxysmal nocturnal dyspnea and pedal edema.      RESPIRATORY:  Negative for dyspnea.      GASTROINTESTINAL:  Negative for abdominal pain, constipation, diarrhea, nausea and vomiting.      MUSCULOSKELETAL:  Positive for foot pain.      NEUROLOGICAL:  Negative for dizziness, headaches, paresthesias, and weakness.      PSYCHIATRIC:  Negative for anxiety, depression, and sleep disturbances.          PMH/FMH/SH:     Last Reviewed on 11/17/2017 01:05 PM by Surjit Gutierrez    Past Medical History:             PAST MEDICAL HISTORY         Hyperlipidemia    Myocardial Infarction     Hypothyroidism    Osteoporosis    OTHER (enter)hypoparathyroidism     Chronic Renal Failure     COPD     Glaucoma             PAST MEDICAL HISTORY             PREVENTIVE HEALTH MAINTENANCE             COLORECTAL CANCER SCREENING: Up to date (colonoscopy q10y; sigmoidoscopy q5y; Cologuard q3y) was last done 11/2011, Results are in chart; colonoscopy with the following abnormalities noted-- serrated adenoma     MAMMOGRAM: Done within last 2 years and results in are chart was last done 07/2016 with normal  results no additional mammograms to be done         Surgical History:         Appendectomy    Cholecystectomy    Hysterectomy    Thyroidectomy     Fracture Repair: left tib fib; 16;         Family History:     Father:  at age 90's     Mother:  at age 90's; Cause of death was heart related     Daughter(s): 2 ;  Breast Cancer;  anaphalaxysis         Social History:     Occupation: Homemaker     Marital Status:      Children: 13         Tobacco/Alcohol/Supplements:     Last Reviewed on 2017 01:05 PM by Surjit Guteirrez    Tobacco: She has never smoked.          Alcohol:  Does not drink alcohol and never has.          Substance Abuse History:     Last Reviewed on 2017 01:05 PM by Surjit Gutierrez        Mental Health History:     Last Reviewed on 2017 01:05 PM by Surjit Gutierrez        Communicable Diseases (eg STDs):     Last Reviewed on 2017 01:05 PM by Surjit Gutierrez            Current Problems:     Last Reviewed on 2017 01:05 PM by Surjit Gutierrez    Macular degeneration, unspecified     Alzheimer's disease     Other vitamin B12 deficiency     Memory loss     Acquired hypothyroidism     Atrial fibrillation     Mixed hyperlipidemia     Hypertension     Hypoparathyroidism         Immunizations:     Prevnar 13 (Pneumococcal PCV 13) 3/16/2015     Fluvirin (4 + years dose) 2009     Fluzone (3 + years dose) 10/16/2008     Fluzone (3 + years dose) 2010     Fluzone (3 + years dose) 2013     Fluzone High-Dose pf (>=65 yr) 10/13/2014     Fluzone High-Dose pf (>=65 yr) 12/15/2015     Fluzone High-Dose pf (>=65 yr) 2017     PNEUMOVAX 23 (Pneumococcal PPV23) 2010         Allergies:     Last Reviewed on 2017 01:05 PM by Surjit Gutierrez    Cipro:        Current Medications:     Last Reviewed on 2017 01:05 PM by Surjit Gutierrez    Synthroid 0.2mg Tablet Take 1 tablet(s) by mouth daily      Calcitriol 0.5mcg Capsules Take 1 capsule(s) by mouth bid     Diltiazem HCl 120mg Capsules, Extended Release Take 1 capsule(s) by mouth daily     Doxycycline  Hyclate 100mg Capsules Take 1 capsule(s) by mouth bid     Evista 60mg Tablet Take 1 tablet(s) by mouth daily     Potassium Chloride 20mEq Tablets, Extended Release Take 1 tablet(s) by mouth bid     Pradaxa 150mg Capsules Take 1 capsule(s) by mouth bid     Pravastatin 80mg Tablet Take 1 tablet(s) by mouth at bedtime     Sertraline HCl 100mg Tablet Take 1 tablet(s) by mouth daily     Vitamin D3 1,000IU Tablet 1 tab bid     Aspirin (ASA) 81mg Chewable Tablet Chew 1 tablet(s) by mouth qam     Vitamin B12 1000mcg weekly x 4 weeks, then monthly     Timolol Maleate 0.5% Gel Forming Ophthalmic Solutio Insill 1 drop(s) to both eye(s) daily         OBJECTIVE:        Vitals:         Current: 3/20/2018 2:55:49 PM    Ht:  5 ft, 6.5 in;  Wt: 169.7 lbs;  BMI: 27.0    T: 97.1 F (oral);  BP: 134/77 mm Hg (left arm, sitting);  P: 76 bpm (left arm (BP Cuff), sitting);  sCr: 0.98 mg/dL;  GFR: 47.39        Exams:     PHYSICAL EXAM:     GENERAL: vital signs recorded - well developed, well nourished;  no apparent distress;     EYES: extraocular movements intact; conjunctiva and cornea are normal; PERRLA;     NECK: range of motion is normal; thyroid is non-palpable;     RESPIRATORY: normal respiratory rate and pattern with no distress; normal breath sounds with no rales, rhonchi, wheezes or rubs;     CARDIOVASCULAR: normal rate; rhythm is regular;  no systolic murmur; no edema;     MUSCULOSKELETAL: RLE with tenderness, R foot and ankle with erythema and ecchymosis, nontender.;     NEUROLOGICAL:  cranial nerves, motor and sensory function, reflexes, gait and coordination are all intact;     PSYCHIATRIC:  appropriate affect and demeanor; normal speech pattern; grossly normal memory;         ASSESSMENT:           729.5   M79.671  Foot pain              DDx:         ORDERS:          Radiology/Test Orders:       [id number]  Radiologic examination, right ankle complete minimum 3 views  (Send-Out)         [id number]  Right radiologic examination, foot; complete, minimum of three views  (Send-Out)         [id number]  Right radiologic examination; tibia and fibula, 2 views  (Send-Out)                   PLAN:          Foot pain Rx alternatives med sent. Epson salt soaks, wrapping, elevation as discussed.         RADIOLOGY:  I have ordered a right ankle xray, a right foot x-ray, and a Right Tibia and fibula xray to be done today.            Orders:       [id number]  Radiologic examination, right ankle complete minimum 3 views  (Send-Out)         [id number]  Right radiologic examination, foot; complete, minimum of three views  (Send-Out)         [id number]  Right radiologic examination; tibia and fibula, 2 views  (Send-Out)               Patient Recommendations:        For  Foot pain:     right ankle x-ray             CHARGE CAPTURE:           Primary Diagnosis:     729.5 Foot pain            M79.671    Pain in right foot              Orders:          [id number]   Office/outpatient visit; established patient, level 3  (In-House)

## 2021-05-18 NOTE — PROGRESS NOTES
Becky Zavala  1934     Office/Outpatient Visit    Visit Date: Fri, Nov 13, 2020 02:51 pm    Provider: Surjit Gutierrez MD (Assistant: Mica Zaragoza LPN)    Location: [facility]        Electronically signed by Surjit Gutierrez MD on  11/14/2020 08:02:34 AM                             Subjective:        CC: dementia follow up        TELEMEDICINE VISIT:    - Trinity consented to this telemedicine visit.    - Persons present during the telemedicine consultation include:  Trinity - patient, Dr. Gutierrez    - This visit is being conducted over FaceTime with audio and video.    HPI:       Trinity is being seen today for follow up on dementia.  Her daughter has noted that Trinity seems to be more anxious in the evenings.  This is especially true when it gets dark and/or when she is alone.  Trinity does have some occasional confusion with this.  They have noted more of an issue since the time change.  Again, it is more of a problem when it gets dark earlier.      ROS:     CONSTITUTIONAL:  Negative for chills and fever.      CARDIOVASCULAR:  Negative for chest pain and palpitations.      RESPIRATORY:  Negative for recent cough and dyspnea.      GASTROINTESTINAL:  Negative for abdominal pain, nausea and vomiting.      INTEGUMENTARY/BREAST:  Negative for atypical mole(s) and rash.          Past Medical History / Family History / Social History:         Last Reviewed on 11/13/2020 03:16 PM by Surjit Gutierrez    Past Medical History:             PAST MEDICAL HISTORY         Hyperlipidemia    Myocardial Infarction     Hypothyroidism    Osteoporosis    Hypoparathyroidism     Chronic Renal Failure     COPD     Glaucoma             PAST MEDICAL HISTORY                 ADVANCE DIRECTIVES: Living will Two of her daughters would make decisions if needed.          PREVENTIVE HEALTH MAINTENANCE             COLORECTAL CANCER SCREENING: Up to date (colonoscopy q10y; sigmoidoscopy q5y; Cologuard q3y) was last done  2019, Results are in chart; colonoscopy with the following abnormalities noted-- serrated adenoma     MAMMOGRAM: Done within last 2 years and results in are chart was last done 2016 with normal results no additional mammograms to be done         Surgical History:         Appendectomy    Cholecystectomy    Hysterectomy    Thyroidectomy     Fracture Repair: left tib fib; [id number];         Family History:     Father:  at age 90's     Mother:  at age 90's; Cause of death was heart related     Daughter(s): 2 ;  Breast Cancer;  anaphalaxysis         Social History:     Occupation: Homemaker     Marital Status:      Children: 13         Tobacco/Alcohol/Supplements:     Last Reviewed on 2020 03:16 PM by Surjit Gutierrez    Tobacco: She has never smoked.          Alcohol:  Does not drink alcohol and never has.          Substance Abuse History:     Last Reviewed on 2020 03:16 PM by Surjit Gutierrez        Mental Health History:     Last Reviewed on 2020 03:16 PM by Surjit Gutierrez        Communicable Diseases (eg STDs):     Last Reviewed on 2020 03:16 PM by Surjit Gutierrez        Current Problems:     Last Reviewed on 2020 03:16 PM by Surjit Gutierrez    Other hypoparathyroidism    Chronic atrial fibrillation    Mixed hyperlipidemia    Essential (primary) hypertension    Other specified hypothyroidism    Unspecified dementia without behavioral disturbance    Other vitamin B12 deficiency anemias    Alzheimer's disease, unspecified    Unspecified macular degeneration    Hemorrhage of anus and rectum    Laceration without foreign body of scalp, initial encounter    Encounter for immunization        Immunizations:     Influenza, high dose seasonal (FLUZONE HIGH-DOSE [id number]) 2020    Td (adult) 2018    Prevnar 13 (Pneumococcal PCV 13) 3/16/2015    Fluvirin (4 + years dose) 2009    Fluzone (3 + years dose) 10/16/2008    Fluzone  (3 + years dose) 11/9/2010    Fluzone (3 + years dose) 1/23/2013    Fluzone High-Dose pf (>=65 yr) 10/13/2014    Fluzone High-Dose pf (>=65 yr) 12/15/2015    Fluzone High-Dose pf (>=65 yr) 11/17/2017    Fluzone High-Dose pf (>=65 yr) 3/5/2019    PNEUMOVAX 23 (Pneumococcal PPV23) 11/9/2010        Allergies:     Last Reviewed on 11/13/2020 03:16 PM by Surjit Gutierrez    Cipro:          Current Medications:     Last Reviewed on 11/13/2020 03:16 PM by Surjit Gutierrez    Timolol Maleate 0.5 % ophthalmic (eye) Gel-Forming Solution [Insill 1 drop(s) to both eye(s) daily  ]    Evista 60 mg oral tablet [Take 1 tablet(s) by mouth daily]    pravastatin 80 mg oral tablet [TAKE 1 TABLET ONCE DAILY]    Klor-Con M20 20 mEq oral Tablet, Extended Release Particles/Crystals [TAKE 1 TABLET TWICE A DAY]    calcitrioL 0.5 mcg oral capsule [take 1 capsule (0.5 mcg) by oral route once daily]    Synthroid 175 mcg oral tablet [take 1 tablet (175 mcg) by oral route once daily]    Pradaxa 150 mg oral capsule [take 1 capsule (150 mg) by oral route 2 times per day]    sertraline 100 mg oral tablet [TAKE 1 TABLET DAILY]    dilTIAZem HCl 120 mg oral Capsule, Extended Release 24 hr [take 1 capsule (120 mg) by oral route once daily]    doxycycline hyclate 100 mg oral capsule [TAKE 1 CAPSULE TWICE DAILY]    cyanocobalamin (vitamin B-12) 1,000 mcg/mL injection Solution [inject 1 milliliter (1,000 mcg) by intramuscular route once a month]    OTC Vitamin D3 2,000IU Take one tablet once daily      OTC PreserVision (AREDS 2) Take one tablet bid         Objective:        Exams:     PHYSICAL EXAM:     GENERAL: vital signs recorded - well developed, well nourished;  no apparent distress;     EYES: extraocular movements intact; conjunctiva and cornea are normal; PERRL;     NECK: range of motion is normal; thyroid is non-palpable;     RESPIRATORY: normal respiratory rate and pattern with no distress; normal breath sounds with no rales, rhonchi,  wheezes or rubs;     CARDIOVASCULAR: normal rate; rhythm is regular;  no systolic murmur; no edema;     GASTROINTESTINAL: nontender; normal bowel sounds; no organomegaly;     BREAST/INTEGUMENT: SKIN: no significant rashes or lesions; no suspicious moles;     PSYCHIATRIC: appropriate affect and demeanor; normal psychomotor function;         Assessment:         G30.9   Alzheimer's disease, unspecified           ORDERS:         Meds Prescribed:       [New Rx] busPIRone 5 mg oral tablet [take 1 tablet (5 mg) by oral route once daily in the evening], #30 (thirty) tablets, Refills: 0 (zero)         Radiology/Test Orders:       [id number]  Colorectal CA screen results documented and reviewed (PV)  (In-House)              Other Orders:       1101F  Pt screen for fall risk; document no falls in past year or only 1 fall w/o injury in past year (FLORINDA)  (In-House)            1123F  Advance Care Planning discussed and doc; advance care plan or surrogate decision maker doc. in MR  (Send-Out)                      Plan:         Alzheimer's disease, unspecified        RECOMMENDATIONS given include: It sounds like she is having some sundowner's.  I'm going to advise a trial of buspirone in the evenings to see if that is helpful.  Risks are discussed as noted below.  If she does not respond well to it, then we may consider other medication.  I want to be avoid anti-psychotics though given the concerns about their us..  RICHARD Has had no falls or only one fall without injury in the past year Vaccines Flu and Pneumonia updated in Shot record Colorectal Cancer Screening is up to date and the results are in the chart ACP discussion: Advance Directive/Surrogate Decision Maker discussed and scanned into chart           Prescriptions:       [New Rx] busPIRone 5 mg oral tablet [take 1 tablet (5 mg) by oral route once daily in the evening], #30 (thirty) tablets, Refills: 0 (zero) fall, sleepiness, dizziness          Orders:       1101F  Pt screen for  fall risk; document no falls in past year or only 1 fall w/o injury in past year (FLORINDA)  (In-House)            3017F  Colorectal CA screen results documented and reviewed (PV)  (In-House)            1123F  Advance Care Planning discussed and doc; advance care plan or surrogate decision maker doc. in MR  (Send-Out)                  Charge Capture:         Primary Diagnosis:     G30.9  Alzheimer's disease, unspecified           Orders:      [id number]  Office/outpatient visit; established patient, level 3  (In-House)            1101F  Pt screen for fall risk; document no falls in past year or only 1 fall w/o injury in past year (FLORINDA)  (In-House)            3017F  Colorectal CA screen results documented and reviewed (PV)  (In-House)

## 2021-05-18 NOTE — PROGRESS NOTES
Becky Zavala 1934     Office/Outpatient Visit    Visit Date: Mon, Jul 8, 2019 01:17 pm    Provider: Surjit Gutierrez MD (Assistant: Nidhi Herrera MA)    Location: [facility]        Electronically signed by Surjit Gutierrez MD on  07/09/2019 07:28:12 AM                             SUBJECTIVE:        CC: follow up on lower GI bleeding, anemia, atrial fib, hypokalemia     Trinity is a 85 year old White female.  She is here today following a transition of care from an inpatient hospital: [facility]. The patient was admitted on 06/24/19 with discharge on 6/28/19. Our office called the patient within 48 hours of discharge and scheduled the follow-up appointment.. During the patient's hospital stay the patient was treated by Dr. Dubose.  (not taking Protonix);         HPI:     Trinity is in today for follow up after her admission to [facility] in [place].  She developed apparent lower GI bleeding suddenly with blood per rectum.  Her daughter took her to [facility] where she was admitted for further evaluation.  She was on Pradaxa, but that was stopped during her stay.  She is back on that now.  She has not had additional bleeding.  No obvious source for the bleeding was noted during admission.  Colonoscopy revealed about 6 polyps one of which was serrated adenoma.  EGD was normal.          Trinity was significantly anemic during the hospital stay.  She had the hemoglobin vary somewhat, but the lowest it got to was 8.9.  She is due for repeat blood work today.         Mixed hyperlipidemia details; current treatment includes Pravachol.  Compliance with treatment has been good.  She specifically denies associated symptoms, including muscle pain and weakness.          Additionally, she presents with history of acquired hypothyroidism.  she is currently taking Synthroid, 200 mcg daily.  TSH was last checked two months ago.  The result was reported as normal.  She denies any related symptoms.          Atrial fibrillation  details; she is here for routine follow-up for atrial fibrillation. Current related medications include a calcium channel blocker for rate control and Pradaxa.      ROS:     CONSTITUTIONAL:  Negative for chills and fever.      EYES:  Negative for blurred vision and eye pain.      E/N/T:  Negative for diminished hearing and nasal congestion.      CARDIOVASCULAR:  Negative for chest pain and palpitations.      RESPIRATORY:  Negative for recent cough and dyspnea.      GASTROINTESTINAL:  Negative for abdominal pain, nausea and vomiting.      GENITOURINARY:  Negative for dysuria and hematuria.      MUSCULOSKELETAL:  Negative for arthralgias and back pain.      INTEGUMENTARY/BREAST:  Negative for atypical mole(s) and rash.      NEUROLOGICAL:  Negative for paresthesias and weakness.      ALLERGIC/IMMUNOLOGIC:  Negative for seasonal allergies and perennial allergies.      PSYCHIATRIC:  Negative for anxiety and depression.          PMH/FMH/SH:     Last Reviewed on 2019 01:46 PM by Surjit Gutierrez    Past Medical History:             PAST MEDICAL HISTORY         Hyperlipidemia    Myocardial Infarction     Hypothyroidism    Osteoporosis    Hypoparathyroidism     Chronic Renal Failure     COPD     Glaucoma             PAST MEDICAL HISTORY             PREVENTIVE HEALTH MAINTENANCE             COLORECTAL CANCER SCREENING: Up to date (colonoscopy q10y; sigmoidoscopy q5y; Cologuard q3y) was last done 2019, Results are in chart; colonoscopy with the following abnormalities noted-- serrated adenoma     MAMMOGRAM: Done within last 2 years and results in are chart was last done 2016 with normal results no additional mammograms to be done         Surgical History:         Appendectomy    Cholecystectomy    Hysterectomy    Thyroidectomy     Fracture Repair: left tib fib; 16;         Family History:     Father:  at age 90's     Mother:  at age 90's; Cause of death was heart related     Daughter(s): 2  ;  Breast Cancer;  anaphalaxysis         Social History:     Occupation: Homemaker     Marital Status:      Children: 13         Tobacco/Alcohol/Supplements:     Last Reviewed on 2019 01:46 PM by Surjit Gutierrez    Tobacco: She has never smoked.          Alcohol:  Does not drink alcohol and never has.          Substance Abuse History:     Last Reviewed on 2019 01:46 PM by Surjit Gutierrez        Mental Health History:     Last Reviewed on 2019 01:46 PM by Surjit Gutierrez        Communicable Diseases (eg STDs):     Last Reviewed on 2019 01:46 PM by Surjit Gutierrez            Current Problems:     Last Reviewed on 2019 01:46 PM by Surjit Gutierrez    Scalp laceration     Macular degeneration, unspecified     Alzheimer's disease     Other vitamin B12 deficiency     Memory loss     Acquired hypothyroidism     Atrial fibrillation     Hypertension     Mixed hyperlipidemia     Hypoparathyroidism     Anemia due to acute blood loss     Blood in stool     Follow-up examination     Rectal bleeding         Immunizations:     Prevnar 13 (Pneumococcal PCV 13) 3/16/2015     Fluvirin (4 + years dose) 2009     Fluzone (3 + years dose) 10/16/2008     Fluzone (3 + years dose) 2010     Fluzone (3 + years dose) 2013     Fluzone High-Dose pf (>=65 yr) 10/13/2014     Fluzone High-Dose pf (>=65 yr) 12/15/2015     Fluzone High-Dose pf (>=65 yr) 2017     Fluzone High-Dose pf (>=65 yr) 3/5/2019     PNEUMOVAX 23 (Pneumococcal PPV23) 2010         Allergies:     Last Reviewed on 2019 01:46 PM by Surjit Gutierrez    Cipro:        Current Medications:     Last Reviewed on 2019 01:46 PM by Surjit Gutierrez    Cyanocobalamin 1,000mcg/1ml Injection 1 cc IM monthly     Calcitriol 0.5mcg Capsules Take 1 capsule(s) by mouth bid     Diltiazem HCl 120mg Capsules, Extended Release Take 1 capsule(s) by mouth daily     Doxycycline  Hyclate  100mg Capsules Take 1 capsule(s) by mouth bid     Evista 60mg Tablet Take 1 tablet(s) by mouth daily     Potassium Chloride 20mEq Tablets, Extended Release Take 1 tablet(s) by mouth bid     Sertraline HCl 100mg Tablet Take 1 tablet(s) by mouth daily     Synthroid 0.2mg Tablet Take 1 tablet(s) by mouth daily     Pravastatin 40mg Tablet 1 tab daily     Protonix 40mg Tablets, Delayed Release 1 po q day     OTC PreserVision (AREDS 2) Take one tablet bid     OTC Vitamin D3 2,000IU Take one tablet once daily     Vitamin B12 1000mcg weekly x 4 weeks, then monthly     Timolol Maleate 0.5% Gel Forming Ophthalmic Solutio Insill 1 drop(s) to both eye(s) daily         OBJECTIVE:        Vitals:         Current: 7/8/2019 1:22:16 PM    Ht:  5 ft, 6.5 in;  Wt: 159 lbs;  BMI: 25.3    T: 98.5 F (oral);  BP: 132/86 mm Hg (left arm, sitting);  P: 77 bpm (left arm (BP Cuff), sitting);  sCr: 1.01 mg/dL;  GFR: 43.22        Exams:     PHYSICAL EXAM:     GENERAL: vital signs recorded - well developed, well nourished;  no apparent distress;     EYES: extraocular movements intact; conjunctiva and cornea are normal; PERRL;     E/N/T:  normal EACs, TMs, nasal/oral mucosa, teeth, gingiva, and oropharynx;     NECK: range of motion is normal; thyroid is non-palpable;     RESPIRATORY: normal respiratory rate and pattern with no distress; normal breath sounds with no rales, rhonchi, wheezes or rubs;     CARDIOVASCULAR: normal rate; rhythm is regular;  no systolic murmur; no edema;     GASTROINTESTINAL: nontender; normal bowel sounds; no organomegaly;     LYMPHATIC: no enlargement of cervical or facial nodes; no supraclavicular nodes;     BREAST/INTEGUMENT: SKIN: no significant rashes or lesions; no suspicious moles;     MUSCULOSKELETAL: no obvious joint effusion or tenderness is noted on exam;     NEUROLOGIC: mental status: oriented to person and place only;  cranial nerves II-XII grossly intact;     PSYCHIATRIC: appropriate affect and demeanor;  normal psychomotor function;         ASSESSMENT           578.1   K92.1  Blood in stool              DDx:     285.1   D62  Anemia due to acute blood loss              DDx:     272.2   E78.2  Mixed hyperlipidemia              DDx:     244.8   E03.8  Acquired hypothyroidism              DDx:     427.31   I48.2  Atrial fibrillation              DDx:     252.1   E20.8  Hypoparathyroidism              DDx:         ORDERS:         Radiology/Test Orders:       3017F  Colorectal CA screen results documented and reviewed (PV)  (In-House)           Lab Orders:       25862  BDCB2 - [facility] CBC w/o diff  (Send-Out)         07324  COMP - [facility] Comp. Metabolic Panel  (Send-Out)         41727  TSH - [facility] TSH  (Send-Out)         71563  PTHIN - [facility] PTH, intact  (Send-Out)           Other Orders:         Depression screen negative  (In-House)         1101F  Pt screen for fall risk; document no falls in past year or only 1 fall w/o injury in past year (FLORINDA)  (In-House)           Negative EtOH screen  (In-House)                   PLAN:          Blood in stool     Today, we have reviewed her care.  She has been doing well since getting home.  I'm going to make no changes today.  She has not had recurrent bleeding up to this time.  We discussed that risk.  I did recommend they follow up with GI as scheduled.  However, I would lean against repeat scope unless it was absolutely necessary.     MIPS PHQ-9 Depression Screening: Completed form scanned and in chart; Total Score 0; Negative Depression Screen Negative alcohol screen           Orders:         Depression screen negative  (In-House)         1101F  Pt screen for fall risk; document no falls in past year or only 1 fall w/o injury in past year (FLORINDA)  (In-House)           Negative EtOH screen  (In-House)         3017F  Colorectal CA screen results documented and reviewed (PV)  (In-House)            Anemia due to acute blood loss     Her color is good today.  We will repeat  her blood counts and see where things stand.     LABORATORY:  Labs ordered to be performed today include CBC W/O DIFF.            Orders:       [id number]  BDCB2 - [facility] CBC w/o diff  (Send-Out)            Mixed hyperlipidemia     Her current medications will be continued as noted above.     LABORATORY:  Labs ordered to be performed today include Comprehensive metabolic panel.            Orders:       18748  COMP - [facility] Comp. Metabolic Panel  (Send-Out)            Acquired hypothyroidism     LABORATORY:  Labs ordered to be performed today include TSH.            Orders:       18750  TSH - [facility] TSH  (Send-Out)            Atrial fibrillation As above.           Patient Education Handouts:       Atrial Fibrillation           Hypoparathyroidism     LABORATORY:  Labs ordered to be performed today include PTH intact.            Orders:       [id number]  PTHIN - [facility] PTH, intact  (Send-Out)               CHARGE CAPTURE           **Please note: ICD descriptions below are intended for billing purposes only and may not represent clinical diagnoses**        Primary Diagnosis:         578.1 Blood in stool            K92.1    Melena              Orders:          53235   Transitional care manage service 14 day discharge  (In-House)                Depression screen negative  (In-House)             1101F   Pt screen for fall risk; document no falls in past year or only 1 fall w/o injury in past year (FLORINDA)  (In-House)                Negative EtOH screen  (In-House)             3017F   Colorectal CA screen results documented and reviewed (PV)  (In-House)           285.1 Anemia due to acute blood loss            D62    Acute posthemorrhagic anemia    272.2 Mixed hyperlipidemia            E78.2    Mixed hyperlipidemia    244.8 Acquired hypothyroidism            E03.8    Other specified hypothyroidism    427.31 Atrial fibrillation            I48.2    Chronic atrial fibrillation    252.1 Hypoparathyroidism            E20.8    Other  hypoparathyroidism

## 2021-05-18 NOTE — PROGRESS NOTES
Becky Zavala  1934     Office/Outpatient Visit    Visit Date: Mon, Jan 13, 2020 04:15 pm    Provider: Surjit Gutierrez MD (Assistant: Shruthi Chan LPN)    Location: [facility]        Electronically signed by Surjit Gutierrez MD on  01/13/2020 11:29:49 PM                             Subjective:        CC: blood pressure, thyroid, a fib, anxiety    HPI:           Trinity presents with essential (primary) hypertension.  Her current cardiac medication regimen includes a calcium channel blocker ( Cardizem CD ).  She is tolerating the medication well without side effects.  Compliance with treatment has been good; she takes her medication as directed and follows up as directed.            Additionally, she presents with history of chronic atrial fibrillation.  she is here for routine follow-up for atrial fibrillation. Current related medications include a calcium channel blocker for rate control and Pradaxa.            With regard to the mixed hyperlipidemia, current treatment includes Pravachol.  Compliance with treatment has been good.  She specifically denies associated symptoms, including muscle pain and weakness.            Other specified hypothyroidism details; she is currently taking Synthroid, 200 mcg daily.  TSH was last checked 6 months ago.  The result was reported as normal.  She denies any related symptoms.      ROS:     CONSTITUTIONAL:  Negative for chills and fever.      CARDIOVASCULAR:  Negative for chest pain and palpitations.      RESPIRATORY:  Negative for recent cough and dyspnea.      GASTROINTESTINAL:  Negative for abdominal pain, nausea and vomiting.      INTEGUMENTARY/BREAST:  Negative for atypical mole(s) and rash.          Past Medical History / Family History / Social History:         Last Reviewed on 1/13/2020 04:51 PM by Surjit Gutierrez    Past Medical History:             PAST MEDICAL HISTORY         Hyperlipidemia    Myocardial Infarction      Hypothyroidism    Osteoporosis    Hypoparathyroidism     Chronic Renal Failure     COPD     Glaucoma             PAST MEDICAL HISTORY             PREVENTIVE HEALTH MAINTENANCE             COLORECTAL CANCER SCREENING: Up to date (colonoscopy q10y; sigmoidoscopy q5y; Cologuard q3y) was last done 2019, Results are in chart; colonoscopy with the following abnormalities noted-- serrated adenoma     MAMMOGRAM: Done within last 2 years and results in are chart was last done 2016 with normal results no additional mammograms to be done         Surgical History:         Appendectomy    Cholecystectomy    Hysterectomy    Thyroidectomy     Fracture Repair: left tib fib; 16;         Family History:     Father:  at age 90's     Mother:  at age 90's; Cause of death was heart related     Daughter(s): 2 ;  Breast Cancer;  anaphalaxysis         Social History:     Occupation: Homemaker     Marital Status:      Children: 13         Tobacco/Alcohol/Supplements:     Last Reviewed on 2020 04:51 PM by Surjit Gutierrez    Tobacco: She has never smoked.          Alcohol:  Does not drink alcohol and never has.          Substance Abuse History:     Last Reviewed on 2020 04:51 PM by Surjit Gutierrez        Mental Health History:     Last Reviewed on 2020 04:51 PM by Sujrit Gutierrez        Communicable Diseases (eg STDs):     Last Reviewed on 2020 04:51 PM by Surjit Gutierrez        Current Problems:     Last Reviewed on 2020 04:51 PM by Surjit Gutierrez    Other hypoparathyroidism    Chronic atrial fibrillation    Mixed hyperlipidemia    Essential (primary) hypertension    Other specified hypothyroidism    Unspecified dementia without behavioral disturbance    Other vitamin B12 deficiency anemias    Alzheimer's disease, unspecified    Unspecified macular degeneration    Hemorrhage of anus and rectum    Laceration without foreign body of scalp, initial  encounter    Encounter for immunization        Immunizations:     Prevnar 13 (Pneumococcal PCV 13) 3/16/2015    Fluvirin (4 + years dose) 9/25/2009    Fluzone (3 + years dose) 10/16/2008    Fluzone (3 + years dose) 11/9/2010    Fluzone (3 + years dose) 1/23/2013    Fluzone High-Dose pf (>=65 yr) 10/13/2014    Fluzone High-Dose pf (>=65 yr) 12/15/2015    Fluzone High-Dose pf (>=65 yr) 11/17/2017    Fluzone High-Dose pf (>=65 yr) 3/5/2019    PNEUMOVAX 23 (Pneumococcal PPV23) 11/9/2010    Td (adult) 7/8/2018        Allergies:     Last Reviewed on 1/13/2020 04:51 PM by Surjit Gutierrez    Cipro:          Current Medications:     Last Reviewed on 1/13/2020 04:51 PM by Surjit Gutierrez    Timolol Maleate 0.5 % ophthalmic (eye) Gel-Forming Solution [Insill 1 drop(s) to both eye(s) daily  ]    Calcitriol 0.5mcg Capsules [Take 1 capsule(s) by mouth bid]    Potassium Chloride 20mEq Tablets, Extended Release [Take 1 tablet(s) by mouth bid]    Evista 60mg Tablet [Take 1 tablet(s) by mouth daily]    pravastatin 80 mg oral tablet [TAKE 1 TABLET AT BEDTIME   (NEEDS APPOINTMENT)]    Synthroid 200 mcg oral tablet [TAKE 1 TABLET DAILY. PLEASEMAKE AN APPOINTMENT WITH   YOUR DOCTOR]    Pradaxa 150 mg oral capsule [Take 1 capsule(s) by mouth bid *NEEDS APPOINTMENT*]    sertraline 100 mg oral tablet [Take 1 tablet(s) by mouth daily]    Vitamin B12 1000mcg weekly x 4 weeks, then monthly     dilTIAZem HCl 120 mg oral Capsule, Extended Release 24 hr [TAKE 1 CAPSULE DAILY (NEEDS APPOINTMENT)]    Doxycycline  Hyclate 100mg Capsules [Take 1 capsule(s) by mouth bid]    Cyanocobalamin 1,000mcg/1ml Injection [1 cc IM monthly]    OTC Vitamin D3 2,000IU Take one tablet once daily      OTC PreserVision (AREDS 2) Take one tablet bid     Protonix 40 mg oral tablet, delayed release (enteric coated) [1 po q day]        Objective:        Vitals:         Current: 1/13/2020 4:25:10 PM    Ht:  5 ft, 6.5 in;  Wt: 157.6 lbs;  BMI: 25.1T: 97.8 F  (oral);  BP: 136/88 mm Hg (right arm, sitting);  P: 82 bpm (right arm (BP Cuff), sitting);  sCr: 0.97 mg/dL;  GFR: 44.84        Exams:     PHYSICAL EXAM:     GENERAL: vital signs recorded - well developed, well nourished;  no apparent distress;     E/N/T:  normal EACs, TMs, nasal/oral mucosa, teeth, gingiva, and oropharynx;     NECK: range of motion is normal; thyroid is non-palpable;     RESPIRATORY: normal respiratory rate and pattern with no distress; normal breath sounds with no rales, rhonchi, wheezes or rubs;     CARDIOVASCULAR: normal rate; rhythm is regular;  no systolic murmur; no edema;     GASTROINTESTINAL: nontender; normal bowel sounds; no organomegaly;     LYMPHATIC: no enlargement of cervical or facial nodes; no supraclavicular nodes;     BREAST/INTEGUMENT: SKIN: no significant rashes or lesions; no suspicious moles;     NEUROLOGIC: mental status: alert;  cranial nerves II-XII grossly intact;     PSYCHIATRIC: appropriate affect and demeanor; normal psychomotor function;         Procedures:     Encounter for immunization    1. Influenza high dose 0.5 ml unit dose, clr, ABN signed given IM in the left upper arm; administered by [facility];  lot number [id number]; expires 05/26/2020 Regarding contraindications to an Influenza vaccine:        No contraindications were noted.              Assessment:         I10   Essential (primary) hypertension       E20.8   Other hypoparathyroidism       I48.2   Chronic atrial fibrillation       E78.2   Mixed hyperlipidemia       E03.8   Other specified hypothyroidism       F03.90   Unspecified dementia without behavioral disturbance       D51.8   Other vitamin B12 deficiency anemias       Z23   Encounter for immunization           ORDERS:         Meds Prescribed:       [Refilled] Calcitriol 0.5 mcg oral capsule [Take 1 capsule(s) by mouth bid], #180 (one hundred and eighty) capsules, Refills: 1 (one)       [Refilled] doxycycline hyclate 100 mg oral capsule [Take 1 capsule(s) by  mouth bid], #180 (one hundred and eighty) capsules, Refills: 1 (one)       [Refilled] Potassium Chloride 20 mEq oral Tablet, Extended Release Particles/Crystals [Take 1 tablet(s) by mouth bid], #180 (one hundred and eighty) tablets, Refills: 1 (one)       [Refilled] cyanocobalamin (vitamin B-12) 1,000 mcg/mL injection Solution [inject 1 milliliter (1,000 mcg) by intramuscular route once a month], #10 (ten) milliliters, Refills: 1 (one)       [Refilled] Evista 60 mg oral tablet [Take 1 tablet(s) by mouth daily], #90 (ninety) tablets, Refills: 1 (one)       [Refilled] sertraline 100 mg oral tablet [Take 1 tablet(s) by mouth daily], #90 (ninety) tablets, Refills: 0 (zero)       [Refilled] Pradaxa 150 mg oral capsule [take 1 capsule (150 mg) by oral route 2 times per day], #180 (one hundred and eighty) capsules, Refills: 1 (one)       [Refilled] pravastatin 80 mg oral tablet [take 1 tablet (80 mg) by oral route once daily], #90 (ninety) tablets, Refills: 1 (one)       [Refilled] dilTIAZem HCl 120 mg oral Capsule, Extended Release 24 hr [take 1 capsule (120 mg) by oral route once daily], #90 (ninety) capsules, Refills: 1 (one)       [Refilled] Synthroid 200 mcg oral tablet [take 1 tablet (200 mcg) by oral route once daily], #90 (ninety) tablets, Refills: 1 (one)         Radiology/Test Orders:       3017F  Colorectal CA screen results documented and reviewed (PV)  (In-House)              Lab Orders:       [id number]  PTHIN - [facility] PTH, intact  (Send-Out)            62635  BDCB2 - [facility] CBC w/o diff  (Send-Out)            45491  COMP - [facility] Comp. Metabolic Panel  (Send-Out)            24937  TSH - [facility] TSH  (Send-Out)            13146  B12FO - [facility] Vitamin B12 with Folate  (Send-Out)              Procedures Ordered:       18416  Fluzone High Dose  (In-House)              Other Orders:         Depression screen negative  (In-House)            1100F  Pt screen for fall risk; document 2+ falls in the past yr or any fall w/injury in  past year (FLORINDA)  (In-House)            1123F  Advance Care Planning discussed and doc; advance care plan or surrogate decision maker doc. in MR  (Send-Out)              Administration of influenza virus vaccine  (x1)                  Plan:         Essential (primary) hypertensionRita seems well today.  I'm going to refill her medications as noted below and arrange labs.  No changes are anticipated.  We will follow closely.    [facility] PHQ-9 Depression Screening: Completed form scanned and in chart; Total Score 0; Negative Depression Screen           Orders:         Depression screen negative  (In-House)            1100F  Pt screen for fall risk; document 2+ falls in the past yr or any fall w/injury in past year (FLORINDA)  (In-House)            3017F  Colorectal CA screen results documented and reviewed (PV)  (In-House)            1123F  Advance Care Planning discussed and doc; advance care plan or surrogate decision maker doc. in MR  (Send-Out)              Other hypoparathyroidism    LABORATORY:  Labs ordered to be performed today include PTH intact.            Prescriptions:       [Refilled] Calcitriol 0.5 mcg oral capsule [Take 1 capsule(s) by mouth bid], #180 (one hundred and eighty) capsules, Refills: 1 (one)           Orders:       19550  PTHIN - [facility] PTH, intact  (Send-Out)              Chronic atrial fibrillation    LABORATORY:  Labs ordered to be performed today include CBC W/O DIFF, Comprehensive metabolic panel, and TSH.            Prescriptions:       [Refilled] Pradaxa 150 mg oral capsule [take 1 capsule (150 mg) by oral route 2 times per day], #180 (one hundred and eighty) capsules, Refills: 1 (one)       [Refilled] dilTIAZem HCl 120 mg oral Capsule, Extended Release 24 hr [take 1 capsule (120 mg) by oral route once daily], #90 (ninety) capsules, Refills: 1 (one)           Orders:       32153  BDCB2 - [facility] CBC w/o diff  (Send-Out)            72223  COMP - [facility] Comp. Metabolic Panel  (Send-Out)             16641  [facility] TSH  (Send-Out)              Mixed hyperlipidemia          Prescriptions:       [Refilled] pravastatin 80 mg oral tablet [take 1 tablet (80 mg) by oral route once daily], #90 (ninety) tablets, Refills: 1 (one)         Other specified hypothyroidism          Prescriptions:       [Refilled] Synthroid 200 mcg oral tablet [take 1 tablet (200 mcg) by oral route once daily], #90 (ninety) tablets, Refills: 1 (one)         Unspecified dementia without behavioral disturbance          Prescriptions:       [Refilled] sertraline 100 mg oral tablet [Take 1 tablet(s) by mouth daily], #90 (ninety) tablets, Refills: 0 (zero)         Other vitamin B12 deficiency anemias    LABORATORY:  Labs ordered to be performed today include B12 with Folate.            Prescriptions:       [Refilled] cyanocobalamin (vitamin B-12) 1,000 mcg/mL injection Solution [inject 1 milliliter (1,000 mcg) by intramuscular route once a month], #10 (ten) milliliters, Refills: 1 (one)           Orders:       63865  [facility] Vitamin B12 with Folate  (Send-Out)              Encounter for immunization        IMMUNIZATIONS given today: Influenza HIGH Dose.            Immunizations:       54126  Fluzone High Dose  (In-House)                Dose (ml): 0.5  Site: left arm  Route: intramuscular  Administered by: Shruthi Chan          : [employer]  Lot #: [id number]  Exp: 05/26/2020          NDC: [address]-80        Administration of influenza virus vaccine  (x1)              Other Prescriptions:       [Refilled] doxycycline hyclate 100 mg oral capsule [Take 1 capsule(s) by mouth bid], #180 (one hundred and eighty) capsules, Refills: 1 (one)       [Refilled] Potassium Chloride 20 mEq oral Tablet, Extended Release Particles/Crystals [Take 1 tablet(s) by mouth bid], #180 (one hundred and eighty) tablets, Refills: 1 (one)       [Refilled] Evista 60 mg oral tablet [Take 1 tablet(s) by mouth daily], #90 (ninety) tablets, Refills: 1 (one)          Charge Capture:         Primary Diagnosis:     I10  Essential (primary) hypertension           Orders:      11674  Office/outpatient visit; established patient, level 4  (In-House)              Depression screen negative  (In-House)            1100F  Pt screen for fall risk; document 2+ falls in the past yr or any fall w/injury in past year (FLORINDA)  (In-House)            3017F  Colorectal CA screen results documented and reviewed (PV)  (In-House)              E20.8  Other hypoparathyroidism     I48.2  Chronic atrial fibrillation     E78.2  Mixed hyperlipidemia     E03.8  Other specified hypothyroidism     F03.90  Unspecified dementia without behavioral disturbance     D51.8  Other vitamin B12 deficiency anemias     Z23  Encounter for immunization           Orders:      04739  Fluzone High Dose  (In-House)              Administration of influenza virus vaccine  (x1)

## 2021-05-18 NOTE — PROGRESS NOTES
Becky Zavala  1934     Office/Outpatient Visit    Visit Date: Fri, Aug 7, 2020 02:45 pm    Provider: Surjit Gutierrez MD (Assistant: Judi Flores RN)    Location: [facility]        Electronically signed by Surjit Gutierrez MD on  08/10/2020 07:31:41 AM                             Subjective:        CC: blood pressure, cholesterol, thyroid, low calcium    HPI:           Patient presents with essential (primary) hypertension.  Her current cardiac medication regimen includes a calcium channel blocker ( Cardizem CD ).  She is tolerating the medication well without side effects.  Compliance with treatment has been good; she takes her medication as directed and follows up as directed.            She does have a low calcium level and remains on calcitriol for this.          In regard to the chronic atrial fibrillation, she is here for routine follow-up for atrial fibrillation. Current related medications include a calcium channel blocker for rate control and Pradaxa.            Mixed hyperlipidemia details; current treatment includes Pravachol.  Compliance with treatment has been good.  She specifically denies associated symptoms, including muscle pain and weakness.            With regard to the other specified hypothyroidism, she is currently taking Synthroid, 200 mcg daily.  TSH was last checked 6 months ago.  The result was reported as normal.  She denies any related symptoms.      ROS:     CONSTITUTIONAL:  Negative for chills and fever.      CARDIOVASCULAR:  Negative for chest pain and palpitations.      RESPIRATORY:  Negative for recent cough and dyspnea.      GASTROINTESTINAL:  Negative for abdominal pain, nausea and vomiting.      INTEGUMENTARY/BREAST:  Negative for atypical mole(s) and rash.          Past Medical History / Family History / Social History:         Last Reviewed on 8/07/2020 03:04 PM by Surjit Gutierrez    Past Medical History:             PAST MEDICAL HISTORY          Hyperlipidemia    Myocardial Infarction     Hypothyroidism    Osteoporosis    Hypoparathyroidism     Chronic Renal Failure     COPD     Glaucoma             PAST MEDICAL HISTORY                 ADVANCE DIRECTIVES: Living will Two of her daughters would make decisions if needed.          PREVENTIVE HEALTH MAINTENANCE             COLORECTAL CANCER SCREENING: Up to date (colonoscopy q10y; sigmoidoscopy q5y; Cologuard q3y) was last done 2019, Results are in chart; colonoscopy with the following abnormalities noted-- serrated adenoma     MAMMOGRAM: Done within last 2 years and results in are chart was last done 2016 with normal results no additional mammograms to be done         Surgical History:         Appendectomy    Cholecystectomy    Hysterectomy    Thyroidectomy     Fracture Repair: left tib fib; 16;         Family History:     Father:  at age 90's     Mother:  at age 90's; Cause of death was heart related     Daughter(s): 2 ;  Breast Cancer;  anaphalaxysis         Social History:     Occupation: Homemaker     Marital Status:      Children: 13         Tobacco/Alcohol/Supplements:     Last Reviewed on 2020 03:04 PM by Surjit Gutierrez    Tobacco: She has never smoked.          Alcohol:  Does not drink alcohol and never has.          Substance Abuse History:     Last Reviewed on 2020 03:04 PM by Surjit Gutierrez        Mental Health History:     Last Reviewed on 2020 03:04 PM by Surjit Gutierrez        Communicable Diseases (eg STDs):     Last Reviewed on 2020 03:04 PM by Surjit Gutierrez        Current Problems:     Last Reviewed on 2020 03:04 PM by Surjit Gutierrez    Other hypoparathyroidism    Chronic atrial fibrillation    Mixed hyperlipidemia    Essential (primary) hypertension    Other specified hypothyroidism    Unspecified dementia without behavioral disturbance    Other vitamin B12 deficiency anemias     Alzheimer's disease, unspecified    Unspecified macular degeneration    Hemorrhage of anus and rectum    Laceration without foreign body of scalp, initial encounter    Encounter for immunization        Immunizations:     Influenza, high dose seasonal (FLUZONE HIGH-DOSE 5822-6484) 1/13/2020    Td (adult) 7/8/2018    Prevnar 13 (Pneumococcal PCV 13) 3/16/2015    Fluvirin (4 + years dose) 9/25/2009    Fluzone (3 + years dose) 10/16/2008    Fluzone (3 + years dose) 11/9/2010    Fluzone (3 + years dose) 1/23/2013    Fluzone High-Dose pf (>=65 yr) 10/13/2014    Fluzone High-Dose pf (>=65 yr) 12/15/2015    Fluzone High-Dose pf (>=65 yr) 11/17/2017    Fluzone High-Dose pf (>=65 yr) 3/5/2019    PNEUMOVAX 23 (Pneumococcal PPV23) 11/9/2010        Allergies:     Last Reviewed on 8/07/2020 03:04 PM by Surjit Gutierrez    Cipro:          Current Medications:     Last Reviewed on 8/07/2020 03:04 PM by Surjit Gutierrez    Timolol Maleate 0.5 % ophthalmic (eye) Gel-Forming Solution [Insill 1 drop(s) to both eye(s) daily  ]    Evista 60 mg oral tablet [Take 1 tablet(s) by mouth daily]    Klor-Con M20 20 mEq oral Tablet, Extended Release Particles/Crystals [TAKE 1 TABLET TWICE A DAY]    pravastatin 80 mg oral tablet [TAKE 1 TABLET ONCE DAILY]    Calcitriol 0.5 mcg oral capsule [Take 1 capsule(s) by mouth bid]    Synthroid 200 mcg oral tablet [TAKE 1 TABLET ONCE DAILY]    Pradaxa 150 mg oral capsule [take 1 capsule (150 mg) by oral route 2 times per day]    sertraline 100 mg oral tablet [TAKE 1 TABLET DAILY]    Vitamin B12 1000mcg weekly x 4 weeks, then monthly     dilTIAZem HCl 120 mg oral Capsule, Extended Release 24 hr [take 1 capsule (120 mg) by oral route once daily]    doxycycline hyclate 100 mg oral capsule [TAKE 1 CAPSULE TWICE DAILY]    cyanocobalamin (vitamin B-12) 1,000 mcg/mL injection Solution [inject 1 milliliter (1,000 mcg) by intramuscular route once a month]    OTC Vitamin D3 2,000IU Take one tablet once  daily      OTC PreserVision (AREDS 2) Take one tablet bid         Objective:        Vitals:         Current: 8/7/2020 2:49:38 PM    Ht:  5 ft, 6.5 in;  Wt: 158 lbs;  BMI: 25.1T: 97.5 F (temporal);  BP: 152/87 mm Hg (right arm, sitting);  P: 81 bpm (right arm (BP Cuff), sitting);  sCr: 0.93 mg/dL;  GFR: 46.00        Repeat:     3:18:10 PM  BP:   131/74mm Hg (right arm, sitting, pulse-74)     Exams:     PHYSICAL EXAM:     GENERAL: vital signs recorded - well developed, well nourished;  no apparent distress;     EYES: extraocular movements intact; conjunctiva and cornea are normal; PERRL;     NECK: range of motion is normal; thyroid is non-palpable;     RESPIRATORY: normal respiratory rate and pattern with no distress; normal breath sounds with no rales, rhonchi, wheezes or rubs;     CARDIOVASCULAR: normal rate; rhythm is regular;  no systolic murmur; no edema;     GASTROINTESTINAL: nontender; normal bowel sounds; no organomegaly;     LYMPHATIC: no enlargement of cervical or facial nodes; no supraclavicular nodes;     BREAST/INTEGUMENT: SKIN: no significant rashes or lesions; no suspicious moles;     NEUROLOGIC: mental status: alert;  cranial nerves II-XII grossly intact;     PSYCHIATRIC: appropriate affect and demeanor; normal psychomotor function;         Assessment:         I10   Essential (primary) hypertension       E20.8   Other hypoparathyroidism       I48.2   Chronic atrial fibrillation       E78.2   Mixed hyperlipidemia       E03.8   Other specified hypothyroidism       D51.8   Other vitamin B12 deficiency anemias           ORDERS:         Meds Prescribed:       [Refilled] Calcitriol 0.5 mcg oral capsule [Take 1 capsule(s) by mouth bid], #180 (one hundred and eighty) capsules, Refills: 1 (one)       [Refilled] cyanocobalamin (vitamin B-12) 1,000 mcg/mL injection Solution [inject 1 milliliter (1,000 mcg) by intramuscular route once a month], #10 (ten) milliliters, Refills: 1 (one)       [Refilled] Evista 60 mg  oral tablet [Take 1 tablet(s) by mouth daily], #90 (ninety) tablets, Refills: 1 (one)       [Refilled] Pradaxa 150 mg oral capsule [take 1 capsule (150 mg) by oral route 2 times per day], #180 (one hundred and eighty) capsules, Refills: 1 (one)       [Refilled] dilTIAZem HCl 120 mg oral Capsule, Extended Release 24 hr [take 1 capsule (120 mg) by oral route once daily], #90 (ninety) capsules, Refills: 1 (one)       [Refilled] doxycycline hyclate 100 mg oral capsule [TAKE 1 CAPSULE TWICE DAILY], #180 (one hundred and eighty) capsules, Refills: 1 (one)       [Refilled] Klor-Con M20 20 mEq oral Tablet, Extended Release Particles/Crystals [TAKE 1 TABLET TWICE A DAY], #180 (one hundred and eighty) tablets, Refills: 1 (one)       [Refilled] pravastatin 80 mg oral tablet [TAKE 1 TABLET ONCE DAILY], #90 (ninety) tablets, Refills: 1 (one)       [Refilled] Synthroid 200 mcg oral tablet [TAKE 1 TABLET ONCE DAILY], #90 (ninety) tablets, Refills: 1 (one)       [Refilled] sertraline 100 mg oral tablet [TAKE 1 TABLET DAILY], #90 (ninety) tablets, Refills: 1 (one)         Radiology/Test Orders:       3017F  Colorectal CA screen results documented and reviewed (PV)  (In-House)              Lab Orders:       [id number]  PTHIN - [facility] PTH, intact  (Send-Out)            29911  BDCB2 - [facility] CBC w/o diff  (Send-Out)            93560  COMP - [facility] Comp. Metabolic Panel  (Send-Out)            29030  MG - [facility] Magnesium, Serum  (Send-Out)            71550  THYII - [facility] Thyroid panel with TSH (65972, 55921)  (Send-Out)            45692  B12FO - [facility] Vitamin B12 with Folate  (Send-Out)            47852  CK - [facility]- CK total  (Send-Out)              Other Orders:       1101F  Pt screen for fall risk; document no falls in past year or only 1 fall w/o injury in past year (FLORINDA)  (In-House)              Screening mammogram results documented  (Send-Out)            1123F  Advance Care Planning discussed and doc; advance care plan or surrogate decision maker  doc. in MR  (Send-Out)                      Plan:         Essential (primary) hypertension        RECOMMENDATIONS given include: Trinity is wonderful today.  We will refill her medications and update her blood work..  RICHARD Has had no falls or only one fall without injury in the past year Vaccines Flu and Pneumonia updated in Shot record Screening mammomgram done within last 2 years and results in are chart Colorectal Cancer Screening is up to date and the results are in the chart ACP discussion: Advance Directive/Surrogate Decision Maker discussed and scanned into chart           Prescriptions:       [Refilled] Calcitriol 0.5 mcg oral capsule [Take 1 capsule(s) by mouth bid], #180 (one hundred and eighty) capsules, Refills: 1 (one)       [Refilled] cyanocobalamin (vitamin B-12) 1,000 mcg/mL injection Solution [inject 1 milliliter (1,000 mcg) by intramuscular route once a month], #10 (ten) milliliters, Refills: 1 (one)       [Refilled] Evista 60 mg oral tablet [Take 1 tablet(s) by mouth daily], #90 (ninety) tablets, Refills: 1 (one)       [Refilled] Pradaxa 150 mg oral capsule [take 1 capsule (150 mg) by oral route 2 times per day], #180 (one hundred and eighty) capsules, Refills: 1 (one)       [Refilled] dilTIAZem HCl 120 mg oral Capsule, Extended Release 24 hr [take 1 capsule (120 mg) by oral route once daily], #90 (ninety) capsules, Refills: 1 (one)       [Refilled] doxycycline hyclate 100 mg oral capsule [TAKE 1 CAPSULE TWICE DAILY], #180 (one hundred and eighty) capsules, Refills: 1 (one)       [Refilled] Klor-Con M20 20 mEq oral Tablet, Extended Release Particles/Crystals [TAKE 1 TABLET TWICE A DAY], #180 (one hundred and eighty) tablets, Refills: 1 (one)       [Refilled] pravastatin 80 mg oral tablet [TAKE 1 TABLET ONCE DAILY], #90 (ninety) tablets, Refills: 1 (one)       [Refilled] Synthroid 200 mcg oral tablet [TAKE 1 TABLET ONCE DAILY], #90 (ninety) tablets, Refills: 1 (one)       [Refilled] sertraline 100 mg  oral tablet [TAKE 1 TABLET DAILY], #90 (ninety) tablets, Refills: 1 (one)           Orders:       1101F  Pt screen for fall risk; document no falls in past year or only 1 fall w/o injury in past year (FLORINDA)  (In-House)              Screening mammogram results documented  (Send-Out)            3017F  Colorectal CA screen results documented and reviewed (PV)  (In-House)            1123F  Advance Care Planning discussed and doc; advance care plan or surrogate decision maker doc. in MR  (Send-Out)              Other hypoparathyroidism    LABORATORY:  Labs ordered to be performed today include PTH intact.            Orders:       [id number]  PTHIN - [facility] PTH, intact  (Send-Out)              Chronic atrial fibrillation    LABORATORY:  Labs ordered to be performed today include CBC W/O DIFF, Comprehensive metabolic panel, and Magnesium level.            Orders:       72718  BDCB2 - [facility] CBC w/o diff  (Send-Out)            05168  COMP - [facility] Comp. Metabolic Panel  (Send-Out)            19390  MG - [facility] Magnesium, Serum  (Send-Out)              Mixed hyperlipidemiaAs above.    LABORATORY:  Labs ordered to be performed today include CK, total.            Orders:       [id number]  CK - [facility]- CK total  (Send-Out)              Other specified hypothyroidism    LABORATORY:  Labs ordered to be performed today include Thyroid Panel.            Orders:       27240  THYII - [facility] Thyroid panel with TSH (67826, 16527)  (Send-Out)              Other vitamin B12 deficiency anemias    LABORATORY:  Labs ordered to be performed today include B12 with Folate.            Orders:       19169  B12FO - [facility] Vitamin B12 with Folate  (Send-Out)                  Charge Capture:         Primary Diagnosis:     I10  Essential (primary) hypertension           Orders:      93160  Office/outpatient visit; established patient, level 4  (In-House)            1101F  Pt screen for fall risk; document no falls in past year or only 1 fall w/o injury in past year (FLORINDA)   (In-House)            3017F  Colorectal CA screen results documented and reviewed (PV)  (In-House)              E20.8  Other hypoparathyroidism     I48.2  Chronic atrial fibrillation     E78.2  Mixed hyperlipidemia     E03.8  Other specified hypothyroidism     D51.8  Other vitamin B12 deficiency anemias

## 2021-05-18 NOTE — PROGRESS NOTES
Becky Zavala 1934     Office/Outpatient Visit    Visit Date: Tue, Mar 5, 2019 02:58 pm    Provider: Surjit Gutierrez MD (Assistant: Judi Flores RN)    Location: [facility]        Electronically signed by Surjit Gutierrez MD on  03/07/2019 01:47:22 PM                             SUBJECTIVE:        CC: follow up blood pressure, a fib, thyroid         HPI:         Additionally, she presents with history of acquired hypothyroidism.  she is currently taking Synthroid, 200 mcg daily.  TSH was last checked more than 6 months ago.  The result was reported as normal.  She denies any related symptoms.          Mixed hyperlipidemia details; current treatment includes Pravachol.  Compliance with treatment has been good.  She specifically denies associated symptoms, including muscle pain and weakness.          Additionally, she presents with history of atrial fibrillation.  she is here for routine follow-up for atrial fibrillation. Current related medications include a calcium channel blocker for rate control and Pradaxa.      ROS:     CONSTITUTIONAL:  Negative for chills and fever.      CARDIOVASCULAR:  Negative for chest pain and palpitations.      RESPIRATORY:  Negative for recent cough and dyspnea.      GASTROINTESTINAL:  Negative for abdominal pain, nausea and vomiting.      INTEGUMENTARY/BREAST:  Negative for atypical mole(s) and rash.          PMH/FMH/SH:     Last Reviewed on 3/05/2019 03:14 PM by Surjit Gutierrez    Past Medical History:             PAST MEDICAL HISTORY         Hyperlipidemia    Myocardial Infarction     Hypothyroidism    Osteoporosis    Hypoparathyroidism     Chronic Renal Failure     COPD     Glaucoma             PAST MEDICAL HISTORY             PREVENTIVE HEALTH MAINTENANCE             COLORECTAL CANCER SCREENING: Up to date (colonoscopy q10y; sigmoidoscopy q5y; Cologuard q3y) was last done 11/2011, Results are in chart; colonoscopy with the following abnormalities  noted-- serrated adenoma     MAMMOGRAM: Done within last 2 years and results in are chart was last done 2016 with normal results no additional mammograms to be done         Surgical History:         Appendectomy    Cholecystectomy    Hysterectomy    Thyroidectomy     Fracture Repair: left tib fib; 216;         Family History:     Father:  at age 90's     Mother:  at age 90's; Cause of death was heart related     Daughter(s): 2 ;  Breast Cancer;  anaphalaxysis         Social History:     Occupation: Homemaker     Marital Status:      Children: 13         Tobacco/Alcohol/Supplements:     Last Reviewed on 3/05/2019 03:14 PM by Surjit Gutierrez    Tobacco: She has never smoked.          Alcohol:  Does not drink alcohol and never has.          Substance Abuse History:     Last Reviewed on 3/05/2019 03:14 PM by Surjit Gutierrez        Mental Health History:     Last Reviewed on 3/05/2019 03:14 PM by Surjit Gutierrez        Communicable Diseases (eg STDs):     Last Reviewed on 3/05/2019 03:14 PM by Surjit Gutierrez            Current Problems:     Last Reviewed on 3/05/2019 03:14 PM by Surjit Gutierrez    Scalp laceration     Macular degeneration, unspecified     Alzheimer's disease     Other vitamin B12 deficiency     Memory loss     Acquired hypothyroidism     Atrial fibrillation     Mixed hyperlipidemia     Hypertension     Hypoparathyroidism     Rectal bleeding         Immunizations:     Prevnar 13 (Pneumococcal PCV 13) 3/16/2015     Fluvirin (4 + years dose) 2009     Fluzone (3 + years dose) 10/16/2008     Fluzone (3 + years dose) 2010     Fluzone (3 + years dose) 2013     Fluzone High-Dose pf (>=65 yr) 10/13/2014     Fluzone High-Dose pf (>=65 yr) 12/15/2015     Fluzone High-Dose pf (>=65 yr) 2017     PNEUMOVAX 23 (Pneumococcal PPV23) 2010         Allergies:     Last Reviewed on 3/05/2019 03:14 PM by Surjit Gutierrez     Cipro:        Current Medications:     Last Reviewed on 3/05/2019 03:14 PM by Surjit Gutierrez    Calcitriol 0.5mcg Capsules Take 1 capsule(s) by mouth bid     Diltiazem HCl 120mg Capsules, Extended Release Take 1 capsule(s) by mouth daily     Pradaxa 150mg Capsules Take 1 capsule(s) by mouth bid     Pravastatin 80mg Tablet Take 1 tablet(s) by mouth at bedtime     Sertraline HCl 100mg Tablet Take 1 tablet(s) by mouth daily     Synthroid 0.2mg Tablet Take 1 tablet(s) by mouth daily     Doxycycline  Hyclate 100mg Capsules Take 1 capsule(s) by mouth bid     Evista 60mg Tablet Take 1 tablet(s) by mouth daily     Potassium Chloride 20mEq Tablets, Extended Release Take 1 tablet(s) by mouth bid     OTC PreserVision (AREDS 2) Take one tablet bid     OTC Vitamin D3 2,000IU Take one tablet once daily     Vitamin B12 1000mcg weekly x 4 weeks, then monthly     Timolol Maleate 0.5% Gel Forming Ophthalmic Solutio Insill 1 drop(s) to both eye(s) daily         OBJECTIVE:        Vitals:         Current: 3/5/2019 3:03:54 PM    Ht:  5 ft, 6.5 in;  Wt: 169.6 lbs;  BMI: 27.0    T: 98 F (oral);  BP: 170/109 mm Hg (left arm, sitting);  P: 76 bpm (left arm (BP Cuff), sitting);  sCr: 1.02 mg/dL;  GFR: 44.76        Repeat:     3:06:12 PM     BP:   150/80mm Hg (left arm, sitting)         Exams:     PHYSICAL EXAM:     GENERAL: vital signs recorded - well developed, well nourished;  no apparent distress;     EYES: extraocular movements intact; conjunctiva and cornea are normal; PERRLA;     E/N/T:  normal EACs, TMs, nasal/oral mucosa, teeth, gingiva, and oropharynx;     NECK: range of motion is normal; thyroid is non-palpable;     RESPIRATORY: normal respiratory rate and pattern with no distress; normal breath sounds with no rales, rhonchi, wheezes or rubs;     CARDIOVASCULAR: normal rate; rhythm is regular;  no systolic murmur; no edema;     GASTROINTESTINAL: nontender; normal bowel sounds; no organomegaly;     LYMPHATIC: no enlargement  of cervical or facial nodes; no supraclavicular nodes;     BREAST/INTEGUMENT: SKIN: no significant rashes or lesions; no suspicious moles;     PSYCHIATRIC: appropriate affect and demeanor; normal psychomotor function;         Procedures:     Influenza vaccination     1. Influenza high dose 0.5 ml unit dose, ABN signed given IM in the left upper arm; administered by [facility] Regarding contraindications to an Influenza vaccine: Denies moderate/severe illness with/without fever; serious reaction to eggs, egg proteins, gentamicin, gelatin, arginine, neomycin or polymixin; serious reaction after recieving previous influenza vaccines; and history of Guillain-[place] Syndrome.              ASSESSMENT           401.1   I10  Hypertension              DDx:     244.8   E03.8  Acquired hypothyroidism              DDx:     272.2   E78.2  Mixed hyperlipidemia              DDx:     331.0   G30.9  Alzheimer's disease              DDx:     427.31   I48.2  Atrial fibrillation              DDx:     252.1   E20.8  Hypoparathyroidism              DDx:     V04.81   Z23  Influenza vaccination              DDx:     281.1   D51.8  Other vitamin B12 deficiency              DDx:         ORDERS:         Meds Prescribed:       Refill of: Calcitriol 0.5mcg Capsules Take 1 capsule(s) by mouth bid  #180 (One [place] and Eighty) capsule(s) Refills: 1       Refill of: Diltiazem HCl 120mg Capsules, Extended Release Take 1 capsule(s) by mouth daily  #90 (Ninety) capsule(s) Refills: 1       Refill of: Pradaxa (Dabigatran Etexilate) 150mg Capsules Take 1 capsule(s) by mouth bid  #180 (One [place] and [place]) capsule(s) Refills: 1       Refill of: Pravastatin 80mg Tablet Take 1 tablet(s) by mouth at bedtime  #90 (Ninety) tablet(s) Refills: 1       Refill of: Sertraline HCl 100mg Tablet Take 1 tablet(s) by mouth daily  #90 (Ninety) tablet(s) Refills: 1       Refill of: Synthroid (Levothyroxine Sodium) 0.2mg Tablet Take 1 tablet(s) by mouth daily  #90 (Ninety)  tablet(s) Refills: 1       Refill of: Doxycycline  Hyclate 100mg Capsules Take 1 capsule(s) by mouth bid  #180 (One [place] and [place]y) capsule(s) Refills: 1       Refill of: Evista (Raloxifene) 60mg Tablet Take 1 tablet(s) by mouth daily  #90 (Ninety) tablet(s) Refills: 1       Refill of: Potassium Chloride 20mEq Tablets, Extended Release Take 1 tablet(s) by mouth bid  #180 (One [place] and [place]) tablet(s) Refills: 1         Lab Orders:       35086  TSH - [facility] TSH  (Send-Out)         04877  BDCB2 - [facility] CBC w/o diff  (Send-Out)         89732  COMP - [facility] Comp. Metabolic Panel  (Send-Out)         20864  VB12 - [facility] Vitamin B12  (Send-Out)           Procedures Ordered:       96280  Fluzone High Dose  (In-House)           Other Orders:         Administration of influenza virus vaccine (x1)                 PLAN:          Hypertension         RECOMMENDATIONS given include: Trinity looks great today.  We are going to refill her medications as noted below.  Otherwise, we will see what the labs show and be back in touch with them.  Her blood pressure is somewhat high today.  We will ask them to check it at home.  We will call in about 3 weeks to see how the pressure is doing..            Prescriptions:       Refill of: Sertraline HCl 100mg Tablet Take 1 tablet(s) by mouth daily  #90 (Ninety) tablet(s) Refills: 1           Patient Education Handouts:       High Blood Pressure (HTN)           Acquired hypothyroidism     LABORATORY:  Labs ordered to be performed today include TSH.            Prescriptions:       Refill of: Synthroid (Levothyroxine Sodium) 0.2mg Tablet Take 1 tablet(s) by mouth daily  #90 (Ninety) tablet(s) Refills: 1           Orders:       83480  TSH - [facility] TSH  (Send-Out)            Mixed hyperlipidemia           Prescriptions:       Refill of: Pravastatin 80mg Tablet Take 1 tablet(s) by mouth at bedtime  #90 (Ninety) tablet(s) Refills: 1          Atrial fibrillation     LABORATORY:  Labs ordered to be  performed today include CBC W/O DIFF, Comprehensive metabolic panel, and TSH.            Prescriptions:       Refill of: Diltiazem HCl 120mg Capsules, Extended Release Take 1 capsule(s) by mouth daily  #90 (Ninety) capsule(s) Refills: 1       Refill of: Pradaxa (Dabigatran Etexilate) 150mg Capsules Take 1 capsule(s) by mouth bid  #180 (One [place] and [place]y) capsule(s) Refills: 1           Orders:       33057  CB2 - [facility] CBC w/o diff  (Send-Out)         04160  COMP - [facility] Comp. Metabolic Panel  (Send-Out)            Hypoparathyroidism As above.           Prescriptions:       Refill of: Calcitriol 0.5mcg Capsules Take 1 capsule(s) by mouth bid  #180 (One [place]) capsule(s) Refills: 1          Influenza vaccination           Orders:       60964  Fluzone High Dose  (In-House)                     Administration of influenza virus vaccine (x1)          Other vitamin B12 deficiency           Orders:       16596  [facility] - [facility] Vitamin B12  (Send-Out)               Other Prescriptions:       Refill of: Doxycycline  Hyclate 100mg Capsules Take 1 capsule(s) by mouth bid  #180 (One [place]) capsule(s) Refills: 1       Refill of: Evista (Raloxifene) 60mg Tablet Take 1 tablet(s) by mouth daily  #90 (Ninety) tablet(s) Refills: 1       Refill of: Potassium Chloride 20mEq Tablets, Extended Release Take 1 tablet(s) by mouth bid  #180 (One [place]) tablet(s) Refills: 1         CHARGE CAPTURE           **Please note: ICD descriptions below are intended for billing purposes only and may not represent clinical diagnoses**        Primary Diagnosis:         401.1 Hypertension            I10    Essential (primary) hypertension              Orders:          90490   Office/outpatient visit; established patient, level 4  (In-House)           244.8 Acquired hypothyroidism            E03.8    Other specified hypothyroidism    272.2 Mixed hyperlipidemia            E78.2    Mixed hyperlipidemia    331.0  Alzheimer's disease            G30.9    Alzheimer's disease, unspecified    427.31 Atrial fibrillation            I48.2    Chronic atrial fibrillation    252.1 Hypoparathyroidism            E20.8    Other hypoparathyroidism    V04.81 Influenza vaccination            Z23    Encounter for immunization              Orders:          74494   Fluzone High Dose  (In-House)                                           Administration of influenza virus vaccine (x1)         281.1 Other vitamin B12 deficiency            D51.8    Other vitamin B12 deficiency anemias

## 2021-05-18 NOTE — PROGRESS NOTES
Becky Zavala  1934     Office/Outpatient Visit    Visit Date: Sat, Apr 3, 2021 12:18 pm    Provider: Nayely Solomon N.P. (Assistant: Judi Flores RN)    Location: [facility]        Electronically signed by Nayely Solomon N.P. on  04/05/2021 06:54:55 AM                             Subjective:        CC: Trinity is a 86 year old White female.  more urinary frequency during the night/more confused;         HPI:           Frequency of micturition noted.  This began within the last 5 days.  She denies associated symptoms, including abdominal pain, fever, flank pain, hematuria, nausea and vomiting.  Trinity states that she had one prior episode of similar discomfort, which was diagnosed as a simple UTI.  Pertinent medical history is unremarkable.  here with daughter.  gets up at night to urinate a little more often.  appetite is normal.  denies memory loss.  drinks mostly water and rarely consumes caffeine or fruit juices.      ROS:     CONSTITUTIONAL:  Negative for chills, fatigue, fever, and weight change.      CARDIOVASCULAR:  Negative for chest pain, palpitations, tachycardia, orthopnea, and edema.      RESPIRATORY:  Negative for cough, dyspnea, and hemoptysis.      GASTROINTESTINAL:  Negative for abdominal pain, heartburn, constipation, diarrhea, and stool changes.      GENITOURINARY:  Positive for frequent urination.   Negative for dysuria or hematuria.      MUSCULOSKELETAL:  Negative for arthralgias, back pain, and myalgias.      NEUROLOGICAL:  Negative for ataxia, dizziness, fainting, headaches, memory loss and weakness.      PSYCHIATRIC:  Negative for anxiety, depression, and sleep disturbances.          Past Medical History / Family History / Social History:         Last Reviewed on 11/13/2020 03:16 PM by Surjit Gutierrez    Past Medical History:             PAST MEDICAL HISTORY         Hyperlipidemia    Myocardial Infarction     Hypothyroidism    Osteoporosis     Hypoparathyroidism     Chronic Renal Failure     COPD     Glaucoma             PAST MEDICAL HISTORY                 ADVANCE DIRECTIVES: Living will Two of her daughters would make decisions if needed.          PREVENTIVE HEALTH MAINTENANCE             COLORECTAL CANCER SCREENING: Up to date (colonoscopy q10y; sigmoidoscopy q5y; Cologuard q3y) was last done 2019, Results are in chart; colonoscopy with the following abnormalities noted-- serrated adenoma     MAMMOGRAM: Done within last 2 years and results in are chart was last done 2016 with normal results no additional mammograms to be done         Surgical History:         Appendectomy    Cholecystectomy    Hysterectomy    Thyroidectomy     Fracture Repair: left tib fib; 16;         Family History:     Father:  at age 90's     Mother:  at age 90's; Cause of death was heart related     Daughter(s): 2 ;  Breast Cancer;  anaphalaxysis         Social History:     Occupation: Homemaker     Marital Status:      Children: 13         Tobacco/Alcohol/Supplements:     Last Reviewed on 2021 12:21 PM by Judi Flores    Tobacco: She has never smoked.          Alcohol:  Does not drink alcohol and never has.          Substance Abuse History:     Last Reviewed on 2020 03:16 PM by Surjit Gutierrez        Mental Health History:     Last Reviewed on 2020 03:16 PM by Surjit Gutierrez        Communicable Diseases (eg STDs):     Last Reviewed on 2020 03:16 PM by Surjit Gutierrez        Current Problems:     Last Reviewed on 2020 03:16 PM by Surjit Gutierrez    Other hypoparathyroidism    Chronic atrial fibrillation    Mixed hyperlipidemia    Essential (primary) hypertension    Other specified hypothyroidism    Hypocalcemia    Unspecified dementia without behavioral disturbance    Other vitamin B12 deficiency anemias    Alzheimer's disease, unspecified    Unspecified macular degeneration     Hemorrhage of anus and rectum    Laceration without foreign body of scalp, initial encounter    Encounter for immunization        Immunizations:     Influenza, high dose seasonal (FLUZONE HIGH-DOSE 8005-5688) 1/13/2020    Td (adult) 7/8/2018    Prevnar 13 (Pneumococcal PCV 13) 3/16/2015    Fluvirin (4 + years dose) 9/25/2009    Fluzone (3 + years dose) 10/16/2008    Fluzone (3 + years dose) 11/9/2010    Fluzone (3 + years dose) 1/23/2013    Fluzone High-Dose pf (>=65 yr) 10/13/2014    Fluzone High-Dose pf (>=65 yr) 12/15/2015    Fluzone High-Dose pf (>=65 yr) 11/17/2017    Fluzone High-Dose pf (>=65 yr) 3/5/2019    PNEUMOVAX 23 (Pneumococcal PPV23) 11/9/2010        Allergies:     Last Reviewed on 4/03/2021 12:21 PM by Judi Flores Cipro:          Current Medications:     Last Reviewed on 11/13/2020 03:16 PM by Surjit Gutierrez    Timolol Maleate 0.5 % ophthalmic (eye) Gel-Forming Solution [Insill 1 drop(s) to both eye(s) daily  ]    Klor-Con M20 20 mEq oral Tablet, Extended Release Particles/Crystals [TAKE 1 TABLET TWICE A DAY]    calcitrioL 0.5 mcg oral capsule [take 1 capsule (0.5 mcg) by oral route twice daily]    pravastatin 80 mg oral tablet [TAKE 1 TABLET ONCE DAILY]    raloxifene 60 mg oral tablet [TAKE 1 TABLET DAILY]    Synthroid 175 mcg oral tablet [TAKE 1 TABLET ONCE DAILY   (PLEASE REPLACE 200MCG     DOSE)]    Pradaxa 150 mg oral capsule [TAKE 1 CAPSULE TWICE DAILY]    sertraline 100 mg oral tablet [TAKE 1 TABLET DAILY]    dilTIAZem HCl 120 mg oral Capsule, Extended Release 24 hr [TAKE 1 CAPSULE ONCE DAILY]    doxycycline hyclate 100 mg oral capsule [TAKE 1 CAPSULE TWICE DAILY]    cyanocobalamin (vitamin B-12) 1,000 mcg/mL injection Solution [inject 1 milliliter (1,000 mcg) by intramuscular route once a month]    OTC Vitamin D3 2,000IU Take one tablet once daily      OTC PreserVision (AREDS 2) Take one tablet bid         Objective:        Vitals:         Current: 4/3/2021 12:25:21 PM    Ht:   5 ft, 6.5 in;  Wt: 159.2 lbs;  BMI: 25.3T: 97.2 F (temporal);  BP: 153/78 mm Hg (right arm, sitting);  P: 80 bpm (right arm (BP Cuff), sitting);  sCr: 0.9 mg/dL;  GFR: 47.69        Repeat:     12:51:35 PM  BP:   138/74mm Hg (left arm, sitting)     Exams:     PHYSICAL EXAM:     GENERAL:  well developed and nourished; appropriately groomed; in no apparent distress;     RESPIRATORY: normal respiratory rate and pattern with no distress; normal breath sounds with no rales, rhonchi, wheezes or rubs;     CARDIOVASCULAR: normal rate; rhythm is regular;     MUSCULOSKELETAL:  Normal range of motion, strength and tone;     NEUROLOGIC: mental status: alert and oriented x 3; GROSSLY INTACT     PSYCHIATRIC:  appropriate affect and demeanor; normal speech pattern; grossly normal memory;         Lab/Test Results:         Glucose, Urine: Neg (04/03/2021),     Bilirubin, urine: Negative (04/03/2021),     Ketones, Urine Strip: Negative (04/03/2021),     Specific Gravity, urine: greater than 1.030 (04/03/2021),     Blood in Urine: negative (04/03/2021),     pH, urine: 6.0 (04/03/2021),     Protein Urine QL: negative (04/03/2021),     Urobilinogen, urine: 0.2 E.U./dL (04/03/2021),     Nitrite, Urine: Negative (04/03/2021),     Leukoctyes, urine: Negative (04/03/2021),     Appearance: Clear (04/03/2021),     collection source: Clean-catch (04/03/2021),     Color: Yellow (04/03/2021),     Performed by:: erica (04/03/2021),             Assessment:         R35.0   Frequency of micturition           ORDERS:         Lab Orders:       [id number]  Urinalysis, automated, without microscopy  (In-House)              Other Orders:       1101F  Pt screen for fall risk; document no falls in past year or only 1 fall w/o injury in past year (FLORINDA)  (In-House)                      Plan:         Frequency of micturition        RECOMMENDATIONS given include: urine not concerning for infection.  pt is alert and provides her own history today and answers questions  appropriately.  offer to get labs to include BMP, tsh, cbc without diff .  declines.  states they will monitor and follow up if concerns persist.  she is due follow up with dr newman.  will get appt scheduled with dr newman for regular follow up.  call back or follow up if concerns persist..  MIPS Has had no falls or only one fall without injury in the past year Vaccines Flu and Pneumonia updated in Shot record           Orders:       00873  Urinalysis, automated, without microscopy  (In-House)            1101F  Pt screen for fall risk; document no falls in past year or only 1 fall w/o injury in past year (FLORINDA)  (In-House)                  Charge Capture:         Primary Diagnosis:     R35.0  Frequency of micturition           Orders:      40452  Office/outpatient visit; established patient, level 3  (In-House)            47974  Urinalysis, automated, without microscopy  (In-House)            1101F  Pt screen for fall risk; document no falls in past year or only 1 fall w/o injury in past year (FLORINDA)  (In-House)

## 2021-05-18 NOTE — PROGRESS NOTES
Becky Zavala 1934     Office/Outpatient Visit    Visit Date: Thu, Jul 5, 2018 12:21 pm    Provider: Surjit Gutierrez MD (Assistant: Anayeli George LPN)    Location: [facility]        Electronically signed by Surjit Gutierrez MD on  07/07/2018 01:33:53 PM                             SUBJECTIVE:        CC: rectal bleeding     Trinity is a 84 year old White female.  The patient is accompanied into the exam room by her dauther in [facility].  blood in stool;         HPI:     Trinity is in today for follow up on rectal bleeding.  This has been noted in the last 48 hours.  She has had a moderate amount of bleeding.  She is on Pradaxa as a blood thinner and has been on this for some time.  She has history of a fib as well as aortic aneurysm.  It sounds like there was a moderate amount of bleeding.  She says that she is tired.  She denies shortness of breath or chest pain.  She did have bowel movement today and did have bleeding then.         Additionally, she presents with history of atrial fibrillation.  she is here for routine follow-up for atrial fibrillation. Current related medications include a calcium channel blocker for rate control and Pradaxa.          Additionally, she presents with history of acquired hypothyroidism.  she is currently taking Synthroid, 200 mcg daily.  TSH was last checked more than 6 months ago.  The result was reported as normal.  She denies any related symptoms.  She reports no symptoms suggestive of adverse medication effect.          In regard to the mixed hyperlipidemia, current treatment includes Pravachol.  Compliance with treatment has been good.  She specifically denies associated symptoms, including muscle pain and weakness.      ROS:     CONSTITUTIONAL:  Negative for chills and fever.      CARDIOVASCULAR:  Negative for chest pain and palpitations.      RESPIRATORY:  Negative for recent cough and dyspnea.      GASTROINTESTINAL:  Negative for abdominal pain, nausea  and vomiting.      INTEGUMENTARY/BREAST:  Negative for atypical mole(s) and rash.          PMH/FMH/SH:     Last Reviewed on 2018 06:15 AM by Surjit Gutierrez    Past Medical History:             PAST MEDICAL HISTORY         Hyperlipidemia    Myocardial Infarction     Hypothyroidism    Osteoporosis    Hypoparathyroidism     Chronic Renal Failure     COPD     Glaucoma             PAST MEDICAL HISTORY             PREVENTIVE HEALTH MAINTENANCE             COLORECTAL CANCER SCREENING: Up to date (colonoscopy q10y; sigmoidoscopy q5y; Cologuard q3y) was last done 2011, Results are in chart; colonoscopy with the following abnormalities noted-- serrated adenoma     MAMMOGRAM: Done within last 2 years and results in are chart was last done 2016 with normal results no additional mammograms to be done         Surgical History:         Appendectomy    Cholecystectomy    Hysterectomy    Thyroidectomy     Fracture Repair: left tib fib; 16;         Family History:     Father:  at age 90's     Mother:  at age 90's; Cause of death was heart related     Daughter(s): 2 ;  Breast Cancer;  anaphalaxysis         Social History:     Occupation: Homemaker     Marital Status:      Children: 13         Tobacco/Alcohol/Supplements:     Last Reviewed on 2018 06:15 AM by Surjit Gutierrez    Tobacco: She has never smoked.          Alcohol:  Does not drink alcohol and never has.          Substance Abuse History:     Last Reviewed on 2018 06:15 AM by Surjit Gutierrez        Mental Health History:     Last Reviewed on 2018 06:15 AM by Surjit Gutierrez        Communicable Diseases (eg STDs):     Last Reviewed on 2018 06:15 AM by Surjit Gutierrez            Current Problems:     Last Reviewed on 2018 06:15 AM by Surjit Gutierrez    Macular degeneration, unspecified     Alzheimer's disease     Other vitamin B12 deficiency     Memory loss     Acquired  hypothyroidism     Atrial fibrillation     Mixed hyperlipidemia     Hypertension     Hypoparathyroidism     Rectal bleeding         Immunizations:     Prevnar 13 (Pneumococcal PCV 13) 3/16/2015     Fluvirin (4 + years dose) 9/25/2009     Fluzone (3 + years dose) 10/16/2008     Fluzone (3 + years dose) 11/9/2010     Fluzone (3 + years dose) 1/23/2013     Fluzone High-Dose pf (>=65 yr) 10/13/2014     Fluzone High-Dose pf (>=65 yr) 12/15/2015     Fluzone High-Dose pf (>=65 yr) 11/17/2017     PNEUMOVAX 23 (Pneumococcal PPV23) 11/9/2010         Allergies:     Last Reviewed on 7/06/2018 06:15 AM by Surjit Gutierrez    Cipro:        Current Medications:     Last Reviewed on 7/06/2018 06:15 AM by Surjit Gutierrez    Calcitriol 0.5mcg Capsules Take 1 capsule(s) by mouth bid     Synthroid 0.2mg Tablet Take 1 tablet(s) by mouth daily     Diltiazem HCl 120mg Capsules, Extended Release Take 1 capsule(s) by mouth daily     Doxycycline  Hyclate 100mg Capsules Take 1 capsule(s) by mouth bid     Evista 60mg Tablet Take 1 tablet(s) by mouth daily     Potassium Chloride 20mEq Tablets, Extended Release Take 1 tablet(s) by mouth bid     Pradaxa 150mg Capsules Take 1 capsule(s) by mouth bid     Pravastatin 80mg Tablet Take 1 tablet(s) by mouth at bedtime     Sertraline HCl 100mg Tablet Take 1 tablet(s) by mouth daily     Vitamin D3 1,000IU Tablet 1 tab bid     Vitamin B12 1000mcg weekly x 4 weeks, then monthly     Timolol Maleate 0.5% Gel Forming Ophthalmic Solutio Insill 1 drop(s) to both eye(s) daily         OBJECTIVE:        Vitals:         Current: 7/5/2018 4:05:16 PM    Ht:  5 ft, 6.5 in;  Wt: 170.2 lbs;  BMI: 27.1    T: 96.9 F (oral);  BP: 155/86 mm Hg (left arm, sitting);  P: 79 bpm (left arm (BP Cuff), sitting);  sCr: 0.98 mg/dL;  GFR: 46.65        Repeat:     4:11:55 PM     BP:   156/86mm Hg (left arm, sitting)         Exams:     PHYSICAL EXAM:     GENERAL: vital signs recorded - well developed, well nourished;  no  apparent distress;     EYES: extraocular movements intact; conjunctiva and cornea are normal; PERRLA;     E/N/T:  normal EACs, TMs, nasal/oral mucosa, teeth, gingiva, and oropharynx;     NECK: range of motion is normal; thyroid is non-palpable;     RESPIRATORY: normal respiratory rate and pattern with no distress; normal breath sounds with no rales, rhonchi, wheezes or rubs;     CARDIOVASCULAR: normal rate; rhythm is regular;  no systolic murmur; no edema;     GASTROINTESTINAL: nontender; normal bowel sounds; no organomegaly; rectal exam: guaiac NEGATIVE stool; no gross blood noted on exam - there is some hemorrhoidal tissue noted - no obvious fissure is apparent;     BREAST/INTEGUMENT: SKIN: no significant rashes or lesions; no suspicious moles;     NEUROLOGIC: mental status: alert; oriented to person and place only;  cranial nerves II-XII grossly intact;         ASSESSMENT           569.3   K62.5  Rectal bleeding              DDx:     401.1   I10  Hypertension              DDx:     252.1   E20.8  Hypoparathyroidism              DDx:     427.31   I48.2  Atrial fibrillation              DDx:     244.8   E03.8  Acquired hypothyroidism              DDx:     272.2   E78.2  Mixed hyperlipidemia              DDx:         ORDERS:         Lab Orders:       [id number]  BDCB2 - [facility] CBC w/o diff  (Send-Out; Stat)         30824  PTHIN - [facility] PTH, intact  (Send-Out)         28650  TSH - [facility] TSH  (Send-Out)         54307  HTNLP - [facility] CMP AND LIPID: [id number], [id number]  (Send-Out)                   PLAN:          Rectal bleeding     LABORATORY:  Labs ordered to be performed today include CBC W/O DIFF.      RECOMMENDATIONS given include: This is a difficult situation because of her history.  Her hemoglobin level is normal, and there is not finding on exam to show significant blood loss.  However, they describe what may be significant bleeding.  It is possible that this is hemorrhoidal in nature.  She does have some history of diverticulosis as  well.  For the near term, we will ask that they STOP Pradaxa and change to aspirin 325mg daily.  Risk of stroke is plainly discussed.  However, I am also concerned about the bleeding and the potential for this to worsen.  We will see what the rest of her blood work shows and go from there.  If the bleeding worsens, then we will have her return to [facility] for evaluation.  Consider referral back to cardiology and possibly to general surgery for colonoscopy.  We will see how things progress..            Orders:       [id number]  BDCB2 - [facility] CBC w/o diff  (Send-Out; Stat)            Hypoparathyroidism     LABORATORY:  Labs ordered to be performed today include PTH intact.            Orders:       [id number]  PTHIN - [facility] PTH, intact  (Send-Out)            Acquired hypothyroidism     LABORATORY:  Labs ordered to be performed today include TSH.            Orders:       [id number]  TSH - [facility] TSH  (Send-Out)            Mixed hyperlipidemia     LABORATORY:  Labs ordered to be performed today include HTN/Lipid Panel: CMP, Lipid.            Orders:       [id number]  HTNLP - [facility] CMP AND LIPID: [id number], [id number]  (Send-Out)               CHARGE CAPTURE           **Please note: ICD descriptions below are intended for billing purposes only and may not represent clinical diagnoses**        Primary Diagnosis:         569.3 Rectal bleeding            K62.5    Hemorrhage of anus and rectum    401.1 Hypertension            I10    Essential (primary) hypertension    252.1 Hypoparathyroidism            E20.8    Other hypoparathyroidism    427.31 Atrial fibrillation            I48.2    Chronic atrial fibrillation    244.8 Acquired hypothyroidism            E03.8    Other specified hypothyroidism    272.2 Mixed hyperlipidemia            E78.2    Mixed hyperlipidemia

## 2021-05-18 NOTE — PROGRESS NOTES
Becky Zavala 1934     Office/Outpatient Visit    Visit Date: Thu, Jul 19, 2018 04:26 pm    Provider: Surjit Gutierrez MD (Assistant: Cristiane Dunn MA)    Location: [facility]        Electronically signed by Surjit Gutierrez MD on  07/20/2018 05:37:47 PM                             SUBJECTIVE:        CC: scalp laceration         HPI:     Trinity is in today for follow up after a recent fall.  She was outside and just lost her balance.  She hit her head on a flower pot  and was noted to have moderate bleeding.  She did not lose consciousness.  She was taken to [facility] where she was treated and released.  She did require multiple staples.  She has had a difficult week with the death of one of her daughters.  She denies headache or dizziness.        With regard to her most recent visit, she had a little bit of rectal bleeding.  She has not had any recurrent issues with bleeding.  She is feeling well for the most part.          Trinity is also needing some additional refills on her medications.  She is doing well.     ROS:     CONSTITUTIONAL:  Negative for chills and fever.      CARDIOVASCULAR:  Negative for chest pain and palpitations.      RESPIRATORY:  Negative for recent cough and dyspnea.      GASTROINTESTINAL:  Negative for abdominal pain, nausea and vomiting.      INTEGUMENTARY/BREAST:  Negative for atypical mole(s) and rash.          PMH/FM/:     Last Reviewed on 7/19/2018 05:01 PM by Surjit Gutierrez    Past Medical History:             PAST MEDICAL HISTORY         Hyperlipidemia    Myocardial Infarction     Hypothyroidism    Osteoporosis    Hypoparathyroidism     Chronic Renal Failure     COPD     Glaucoma             PAST MEDICAL HISTORY             PREVENTIVE HEALTH MAINTENANCE             COLORECTAL CANCER SCREENING: Up to date (colonoscopy q10y; sigmoidoscopy q5y; Cologuard q3y) was last done 11/2011, Results are in chart; colonoscopy with the following abnormalities  noted-- serrated adenoma     MAMMOGRAM: Done within last 2 years and results in are chart was last done 2016 with normal results no additional mammograms to be done         Surgical History:         Appendectomy    Cholecystectomy    Hysterectomy    Thyroidectomy     Fracture Repair: left tib fib; 216;         Family History:     Father:  at age 90's     Mother:  at age 90's; Cause of death was heart related     Daughter(s): 2 ;  Breast Cancer;  anaphalaxysis         Social History:     Occupation: Homemaker     Marital Status:      Children: 13         Tobacco/Alcohol/Supplements:     Last Reviewed on 2018 05:01 PM by Surjit Gutierrez    Tobacco: She has never smoked.          Alcohol:  Does not drink alcohol and never has.          Substance Abuse History:     Last Reviewed on 2018 05:01 PM by Surjit Gutierrez        Mental Health History:     Last Reviewed on 2018 05:01 PM by Surjit Gutierrez        Communicable Diseases (eg STDs):     Last Reviewed on 2018 05:01 PM by Surjit Gutierrez            Current Problems:     Last Reviewed on 2018 05:01 PM by Surjit Gutierrez    Scalp laceration     Macular degeneration, unspecified     Alzheimer's disease     Other vitamin B12 deficiency     Memory loss     Acquired hypothyroidism     Atrial fibrillation     Mixed hyperlipidemia     Hypertension     Hypoparathyroidism     Rectal bleeding         Immunizations:     Prevnar 13 (Pneumococcal PCV 13) 3/16/2015     Fluvirin (4 + years dose) 2009     Fluzone (3 + years dose) 10/16/2008     Fluzone (3 + years dose) 2010     Fluzone (3 + years dose) 2013     Fluzone High-Dose pf (>=65 yr) 10/13/2014     Fluzone High-Dose pf (>=65 yr) 12/15/2015     Fluzone High-Dose pf (>=65 yr) 2017     PNEUMOVAX 23 (Pneumococcal PPV23) 2010         Allergies:     Last Reviewed on 2018 05:01 PM by Surjit Gutierrez     Cipro:        Current Medications:     Last Reviewed on 7/19/2018 05:01 PM by Surjit Gutierrez    Evista 60mg Tablet Take 1 tablet(s) by mouth daily     Potassium Chloride 20mEq Tablets, Extended Release Take 1 tablet(s) by mouth bid     Pravastatin 80mg Tablet Take 1 tablet(s) by mouth at bedtime     Synthroid 0.2mg Tablet Take 1 tablet(s) by mouth daily     Calcitriol 0.5mcg Capsules Take 1 capsule(s) by mouth bid     Diltiazem HCl 120mg Capsules, Extended Release Take 1 capsule(s) by mouth daily     Doxycycline  Hyclate 100mg Capsules Take 1 capsule(s) by mouth bid     Pradaxa 150mg Capsules Take 1 capsule(s) by mouth bid     Sertraline HCl 100mg Tablet Take 1 tablet(s) by mouth daily     Vitamin D3 1,000IU Tablet 1 tab bid     Vitamin B12 1000mcg weekly x 4 weeks, then monthly     Timolol Maleate 0.5% Gel Forming Ophthalmic Solutio Insill 1 drop(s) to both eye(s) daily     OTC PreserVision (AREDS 2) Take one tablet bid     OTC Vitamin D3 2,000IU Take one tablet once daily         OBJECTIVE:        Vitals:         Current: 7/19/2018 4:29:14 PM    Ht:  5 ft, 6.5 in;  Wt: 170.4 lbs;  BMI: 27.1    T: 96 F (oral);  BP: 163/89 mm Hg (left arm, sitting);  P: 78 bpm (left arm (BP Cuff), sitting);  sCr: 1.02 mg/dL;  GFR: 44.85        Repeat:     5:06:29 PM     BP:   148/80mm Hg (left arm, sitting)         Exams:     PHYSICAL EXAM:     GENERAL: vital signs recorded - well developed, well nourished;  no apparent distress;     EYES: extraocular movements intact; conjunctiva and cornea are normal; PERRLA;     E/N/T:  normal EACs, TMs, nasal/oral mucosa, teeth, gingiva, and oropharynx;     NECK: range of motion is normal; thyroid is non-palpable;     RESPIRATORY: normal respiratory rate and pattern with no distress; normal breath sounds with no rales, rhonchi, wheezes or rubs;     CARDIOVASCULAR: normal rate; rhythm is regular;  no systolic murmur; no edema;     GASTROINTESTINAL: nontender; normal bowel sounds; no  organomegaly;     BREAST/INTEGUMENT: SKIN: no significant rashes or lesions; no suspicious moles; there is a scalp laceration in the back of the head with at least 5 staples in place;         ASSESSMENT           873.0   S01.01XA  Scalp laceration              DDx:     569.3   K62.5  Rectal bleeding              DDx:     331.0   G30.9  Alzheimer's disease              DDx:     427.31   I48.2  Atrial fibrillation              DDx:     252.1   E20.8  Hypoparathyroidism              DDx:         ORDERS:         Meds Prescribed:       Refill of: Calcitriol 0.5mcg Capsules Take 1 capsule(s) by mouth bid  #180 (One [place] and Eighty) capsule(s) Refills: 1       Refill of: Diltiazem HCl 120mg Capsules, Extended Release Take 1 capsule(s) by mouth daily  #90 (Ninety) capsule(s) Refills: 1       Refill of: Doxycycline  Hyclate 100mg Capsules Take 1 capsule(s) by mouth bid  #180 (One [place] and Eighty) capsule(s) Refills: 1       Refill of: Pradaxa (Dabigatran Etexilate) 150mg Capsules Take 1 capsule(s) by mouth bid  #180 (One [place] and [place]y) capsule(s) Refills: 1       Refill of: Sertraline HCl 100mg Tablet Take 1 tablet(s) by mouth daily  #90 (Ninety) tablet(s) Refills: 1                 PLAN:          Scalp laceration         RECOMMENDATIONS given include: Trinity seems to be doing well overall.  I have reviewed her care including recent care in the ER and here.  The staples are removed without difficulty.  I'm going to refill her medications, and plan to see her again in about 6 months.  No other near term changes are anticipated.  We will follow from there.  I'm going to recheck her blood pressure here and will consider how to move forward..            Patient Education Handouts:       [facility]           Rectal bleeding As above.          Alzheimer's disease           Prescriptions:       Refill of: Sertraline HCl 100mg Tablet Take 1 tablet(s) by mouth daily  #90 (Ninety) tablet(s) Refills: 1           Atrial fibrillation           Prescriptions:       Refill of: Diltiazem HCl 120mg Capsules, Extended Release Take 1 capsule(s) by mouth daily  #90 (Ninety) capsule(s) Refills: 1       Refill of: Pradaxa (Dabigatran Etexilate) 150mg Capsules Take 1 capsule(s) by mouth bid  #180 (One [place] and Eighty) capsule(s) Refills: 1          Hypoparathyroidism           Prescriptions:       Refill of: Calcitriol 0.5mcg Capsules Take 1 capsule(s) by mouth bid  #180 (One [place] and Eighty) capsule(s) Refills: 1             Other Prescriptions:       Refill of: Doxycycline  Hyclate 100mg Capsules Take 1 capsule(s) by mouth bid  #180 (One [place] and Eighty) capsule(s) Refills: 1         CHARGE CAPTURE           **Please note: ICD descriptions below are intended for billing purposes only and may not represent clinical diagnoses**        Primary Diagnosis:         873.0 Scalp laceration            S01.01XA    Laceration without foreign body of scalp, initial encounter              Orders:          [id number]   Office/outpatient visit; established patient, level 4  (In-House)           569.3 Rectal bleeding            K62.5    Hemorrhage of anus and rectum    331.0 Alzheimer's disease            G30.9    Alzheimer's disease, unspecified    427.31 Atrial fibrillation            I48.2    Chronic atrial fibrillation    252.1 Hypoparathyroidism            E20.8    Other hypoparathyroidism

## 2021-05-24 ENCOUNTER — HOSPITAL ENCOUNTER (OUTPATIENT)
Dept: OTHER | Facility: HOSPITAL | Age: 86
Discharge: HOME | End: 2021-05-24
Attending: FAMILY MEDICINE

## 2021-05-24 ENCOUNTER — OFFICE VISIT CONVERTED (OUTPATIENT)
Dept: FAMILY MEDICINE CLINIC | Age: 86
End: 2021-05-24
Attending: FAMILY MEDICINE

## 2021-05-24 LAB
ALBUMIN SERPL-MCNC: 3.9 G/DL (ref 3.5–5)
ALBUMIN/GLOB SERPL: 1.5 {RATIO} (ref 1.4–2.6)
ALP SERPL-CCNC: 65 U/L (ref 43–160)
ALT SERPL-CCNC: 14 U/L (ref 10–40)
ANION GAP SERPL CALC-SCNC: 15 MMOL/L (ref 8–19)
AST SERPL-CCNC: 17 U/L (ref 15–50)
BILIRUB SERPL-MCNC: 0.83 MG/DL (ref 0.2–1.3)
BUN SERPL-MCNC: 21 MG/DL (ref 5–25)
BUN/CREAT SERPL: 19 {RATIO} (ref 6–20)
CALCIUM SERPL-MCNC: 9.7 MG/DL (ref 8.7–10.4)
CHLORIDE SERPL-SCNC: 105 MMOL/L (ref 99–111)
CHOLEST SERPL-MCNC: 159 MG/DL (ref 107–200)
CHOLEST/HDLC SERPL: 4.7 {RATIO} (ref 3–6)
CK SERPL-CCNC: 38 U/L (ref 35–230)
CONV CO2: 29 MMOL/L (ref 22–32)
CONV TOTAL PROTEIN: 6.5 G/DL (ref 6.3–8.2)
CREAT UR-MCNC: 1.11 MG/DL (ref 0.5–0.9)
ERYTHROCYTE [DISTWIDTH] IN BLOOD BY AUTOMATED COUNT: 13.1 % (ref 11.7–14.4)
GFR SERPLBLD BASED ON 1.73 SQ M-ARVRAT: 45 ML/MIN/{1.73_M2}
GLOBULIN UR ELPH-MCNC: 2.6 G/DL (ref 2–3.5)
GLUCOSE SERPL-MCNC: 120 MG/DL (ref 65–99)
HCT VFR BLD AUTO: 41.9 % (ref 37–47)
HDLC SERPL-MCNC: 34 MG/DL (ref 40–60)
HGB BLD-MCNC: 13.5 G/DL (ref 12–16)
LDLC SERPL CALC-MCNC: 91 MG/DL (ref 70–100)
MCH RBC QN AUTO: 28.7 PG (ref 27–31)
MCHC RBC AUTO-ENTMCNC: 32.2 G/DL (ref 33–37)
MCV RBC AUTO: 89 FL (ref 81–99)
OSMOLALITY SERPL CALC.SUM OF ELEC: 302 MOSM/KG (ref 273–304)
PLATELET # BLD AUTO: 133 10*3/UL (ref 130–400)
PMV BLD AUTO: 11.5 FL (ref 9.4–12.3)
POTASSIUM SERPL-SCNC: 4.8 MMOL/L (ref 3.5–5.3)
RBC # BLD AUTO: 4.71 10*6/UL (ref 4.2–5.4)
SODIUM SERPL-SCNC: 144 MMOL/L (ref 135–147)
T4 FREE SERPL-MCNC: 1.3 NG/DL (ref 0.9–1.8)
TRIGL SERPL-MCNC: 172 MG/DL (ref 40–150)
TSH SERPL-ACNC: 1.5 M[IU]/L (ref 0.27–4.2)
VIT B12 SERPL-MCNC: 524 PG/ML (ref 211–911)
VLDLC SERPL-MCNC: 34 MG/DL (ref 5–37)
WBC # BLD AUTO: 7.92 10*3/UL (ref 4.8–10.8)

## 2021-05-25 ENCOUNTER — TELEPHONE (OUTPATIENT)
Dept: GASTROENTEROLOGY | Facility: CLINIC | Age: 86
End: 2021-05-25

## 2021-05-25 NOTE — TELEPHONE ENCOUNTER
----- Message from Marcus Oro sent at 5/25/2021 10:39 AM EDT -----  Regarding: Recall Date  Contact: [phone]  Shy zuñiga is calling to verify when PT is due for another scope, please advise.

## 2021-05-26 NOTE — TELEPHONE ENCOUNTER
Last colonoscopy in June 2019 with hyperplastic polyps.  Would not offer follow-up colonoscopy unless she has symptoms that warrant intervention since the next screening test would be in 10 years.

## 2021-06-05 NOTE — PROGRESS NOTES
Becky Zavala  1934     Office/Outpatient Visit    Visit Date: Mon, May 24, 2021 03:46 pm    Provider: Surjit Gutierrez MD (Assistant: Nidhi Herrera MA)    Location: [facility]        Electronically signed by Surjit Gutierrez MD on  05/26/2021 08:17:28 AM                             Subjective:        CC: wellness exam, medication follow up    HPI:           Trinity is here for a Medicare wellness visit.  The required HRA questions are integrated within this visit note. Family medical history and individual medical/surgical history were reviewed and updated.  A current height, weight, BMI, blood pressure, and pulse were recorded in the vitals section of the note and have been reviewed. Patient's medications, including supplements, were recorded in the chart and reviewed.  Current providers and suppliers were reviewed and updated.          Self-Assessment of Health: She rates her health as fair. She rates her confidence of being able to control/manage most of her health problems as somewhat confident. Her physical/emotional health has limited her social activites slightly.  A review of cognitive impairment was performed, including ability to drive a car, manage finances, and any memory changes, and was found to be negative.  A review of functional ability, including bathing, dressing, walking, and urine/bowel continence as well as level of safety was performed and was found to be negative.  Falls Risk: Has not had any falls or only one fall without injury in the past year.  She denies having trouble hearing the TV/radio when others do not, having to strain to hear or understand conversations and wearing hearing aid(s).  Concerning home safety, she reports that at home she DOES have adequate lighting, a skid resistant shower/tub, grab bars in the bath, handrails on stairs, functioning smoke alarms and absence of throw rugs.          Immunization Status: Age>60, no shingles vaccination;  Physical Activity: She exercises but less than 20 minutes 3 days per week; Type of diet patient normally eats is described as well-balanced with fruits and vegetables Tobacco: She has never smoked.  Preventative Health updated today           Trinity does have history of hypoparathyroidism.  She remains on calcitriol for her calcium level.          Additionally, she presents with history of chronic atrial fibrillation.  she is here for routine follow-up for atrial fibrillation. Current related medications include a calcium channel blocker for rate control and Pradaxa.            Concerning mixed hyperlipidemia, current treatment includes Pravachol.  Compliance with treatment has been good.  She specifically denies associated symptoms, including muscle pain and weakness.            Dx with essential (primary) hypertension; her current cardiac medication regimen includes a calcium channel blocker ( Cardizem CD ).  She is tolerating the medication well without side effects.  Compliance with treatment has been good; she takes her medication as directed and follows up as directed.            In regard to the other specified hypothyroidism, she is currently taking Synthroid, 175 mcg daily.  TSH was last checked 6 months ago.  She denies any related symptoms.      ROS:     CONSTITUTIONAL:  Negative for chills and fever.      CARDIOVASCULAR:  Negative for chest pain and palpitations.      RESPIRATORY:  Negative for recent cough and dyspnea.      GASTROINTESTINAL:  Negative for abdominal pain, nausea and vomiting.      INTEGUMENTARY/BREAST:  Positive for atypical mole(s).   Negative for rash.          Past Medical History / Family History / Social History:         Last Reviewed on 11/13/2020 03:16 PM by Surjit Gutierrez    Past Medical History:             PAST MEDICAL HISTORY         Hyperlipidemia    Myocardial Infarction     Hypothyroidism    Osteoporosis    Hypoparathyroidism     Chronic Renal Failure     COPD      Glaucoma             PAST MEDICAL HISTORY                 ADVANCE DIRECTIVES: Living will Two of her daughters would make decisions if needed.          PREVENTIVE HEALTH MAINTENANCE             COLORECTAL CANCER SCREENING: Up to date (colonoscopy q10y; sigmoidoscopy q5y; Cologuard q3y) was last done 2019, Results are in chart; colonoscopy with the following abnormalities noted-- serrated adenoma     MAMMOGRAM: Done within last 2 years and results in are chart was last done 2016 with normal results no additional mammograms to be done         Surgical History:         Appendectomy    Cholecystectomy    Hysterectomy    Thyroidectomy     Fracture Repair: left tib fib; 16;         Family History:     Father:  at age 90's     Mother:  at age 90's; Cause of death was heart related     Daughter(s): 2 ;  Breast Cancer;  anaphalaxysis         Social History:     Occupation: Homemaker     Marital Status:      Children: 13         Tobacco/Alcohol/Supplements:     Last Reviewed on 2021 12:21 PM by Judi Flores    Tobacco: She has never smoked.          Alcohol:  Does not drink alcohol and never has.          Substance Abuse History:     Last Reviewed on 2020 03:16 PM by Surjit Gutierrez        Mental Health History:     Last Reviewed on 2020 03:16 PM by Surjit Gutierrez        Communicable Diseases (eg STDs):     Last Reviewed on 2020 03:16 PM by Surjit Gutierrez        Current Problems:     Last Reviewed on 2020 03:16 PM by Surjit Gutierrez    Other hypoparathyroidism    Chronic atrial fibrillation    Mixed hyperlipidemia    Essential (primary) hypertension    Other specified hypothyroidism    Hypocalcemia    Unspecified dementia without behavioral disturbance    Other vitamin B12 deficiency anemias    Alzheimer's disease, unspecified    Unspecified macular degeneration    Hemorrhage of anus and rectum    Laceration without foreign body of  scalp, initial encounter    Encounter for immunization    Frequency of micturition        Immunizations:     Influenza, high dose seasonal (FLUZONE HIGH-DOSE 7030-7666) 1/13/2020    Td (adult) 7/8/2018    Prevnar 13 (Pneumococcal PCV 13) 3/16/2015    Fluvirin (4 + years dose) 9/25/2009    Fluzone (3 + years dose) 10/16/2008    Fluzone (3 + years dose) 11/9/2010    Fluzone (3 + years dose) 1/23/2013    Fluzone High-Dose pf (>=65 yr) 10/13/2014    Fluzone High-Dose pf (>=65 yr) 12/15/2015    Fluzone High-Dose pf (>=65 yr) 11/17/2017    Fluzone High-Dose pf (>=65 yr) 3/5/2019    PNEUMOVAX 23 (Pneumococcal PPV23) 11/9/2010        Allergies:     Last Reviewed on 5/24/2021 03:51 PM by Nidhi Herrera    Cipro:          Current Medications:     Last Reviewed on 5/24/2021 03:51 PM by Nidhi Herrera    calcitrioL 0.5 mcg oral capsule [take 1 capsule (0.5 mcg) by oral route twice daily]    raloxifene 60 mg oral tablet [TAKE 1 TABLET DAILY]    Timolol Maleate 0.5 % ophthalmic (eye) Gel-Forming Solution [Insill 1 drop(s) to both eye(s) daily  ]    Klor-Con M20 20 mEq oral Tablet, Extended Release Particles/Crystals [TAKE 1 TABLET TWICE A DAY]    pravastatin 80 mg oral tablet [TAKE 1 TABLET ONCE DAILY]    Synthroid 175 mcg oral tablet [TAKE 1 TABLET ONCE DAILY   (PLEASE REPLACE 200MCG     DOSE)]    Pradaxa 150 mg oral capsule [TAKE 1 CAPSULE TWICE DAILY]    sertraline 100 mg oral tablet [TAKE 1 TABLET DAILY]    dilTIAZem HCl 120 mg oral Capsule, Extended Release 24 hr [TAKE 1 CAPSULE ONCE DAILY]    doxycycline hyclate 100 mg oral capsule [TAKE 1 CAPSULE TWICE DAILY]    cyanocobalamin (vitamin B-12) 1,000 mcg/mL injection Solution [inject 1 milliliter (1,000 mcg) by intramuscular route once a month]    OTC Vitamin D3 2,000IU Take one tablet once daily      OTC PreserVision (AREDS 2) Take one tablet bid         Objective:        Vitals:         Current: 5/24/2021 3:52:24 PM    Ht:  5 ft, 6.5 in;  Wt: 158 lbs;  BMI: 25.1T: 97.5 F  (temporal);  BP: 143/72 mm Hg (left arm, sitting);  P: 66 bpm (right arm (BP Cuff), sitting);  sCr: 0.9 mg/dL;  GFR: 47.54        Exams:     PHYSICAL EXAM:     GENERAL: vital signs recorded - well developed, well nourished;  no apparent distress;     EYES: extraocular movements intact; conjunctiva and cornea are normal; PERRL;     E/N/T: EARS: external auditory canal occluded by cerumen right partially;  left TM is normal;     NECK: range of motion is normal; thyroid is non-palpable;     RESPIRATORY: normal respiratory rate and pattern with no distress; normal breath sounds with no rales, rhonchi, wheezes or rubs;     CARDIOVASCULAR: normal rate; rhythm is regular;  no systolic murmur; no edema;     GASTROINTESTINAL: nontender; normal bowel sounds; no organomegaly;     LYMPHATIC: no enlargement of cervical or facial nodes; no supraclavicular nodes;     BREAST/INTEGUMENT: (dark colored mole R foearm)     NEUROLOGIC: mental status: alert and oriented x 3; cranial nerves II-XII grossly intact;     PSYCHIATRIC: appropriate affect and demeanor; normal psychomotor function;         Assessment:         Z00.00   Encounter for general adult medical examination without abnormal findings       E20.8   Other hypoparathyroidism       I48.2   Chronic atrial fibrillation       E78.2   Mixed hyperlipidemia       I10   Essential (primary) hypertension       E03.8   Other specified hypothyroidism       D48.5   Neoplasm of uncertain behavior of skin       D51.8   Other vitamin B12 deficiency anemias           ORDERS:         Meds Prescribed:       [Refilled] cyanocobalamin (vitamin B-12) 1,000 mcg/mL injection Solution [inject 1 milliliter (1,000 mcg) by intramuscular route once a month], #10 (ten) milliliters, Refills: 1 (one)       [Refilled] raloxifene 60 mg oral tablet [TAKE 1 TABLET DAILY], #90 (ninety) tablets, Refills: 1 (one)       [Refilled] Pradaxa 150 mg oral capsule [TAKE 1 CAPSULE TWICE DAILY], #180 (one hundred and  eighty) capsules, Refills: 1 (one)       [Refilled] dilTIAZem HCl 120 mg oral Capsule, Extended Release 24 hr [TAKE 1 CAPSULE ONCE DAILY], #90 (ninety) capsules, Refills: 1 (one)       [Refilled] doxycycline hyclate 100 mg oral capsule [TAKE 1 CAPSULE TWICE DAILY], #180 (one hundred and eighty) capsules, Refills: 1 (one)       [Refilled] Klor-Con M20 20 mEq oral Tablet, Extended Release Particles/Crystals [TAKE 1 TABLET TWICE A DAY], #180 (one hundred and eighty) tablets, Refills: 1 (one)       [Refilled] sertraline 100 mg oral tablet [TAKE 1 TABLET DAILY], #90 (ninety) tablets, Refills: 1 (one)       [Refilled] Synthroid 175 mcg oral tablet [TAKE 1 TABLET ONCE DAILY   (PLEASE REPLACE 200MCG     DOSE)], #90 (ninety) tablets, Refills: 1 (one)       [Refilled] pravastatin 80 mg oral tablet [TAKE 1 TABLET ONCE DAILY], #90 (ninety) tablets, Refills: 1 (one)         Radiology/Test Orders:       3017F  Colorectal CA screen results documented and reviewed (PV)  (In-House)              Lab Orders:       34588  BDCB2 - [facility] CBC w/o diff  (Send-Out)            08546  COMP - [facility] Comp. Metabolic Panel  (Send-Out)            55606  CK - [facility]- CK total  (Send-Out)            60530  LPDP - [facility] Lipid Panel  (Send-Out)            32368  THYII - [facility] Thyroid panel with TSH (72191, 27249)  (Send-Out)            41401  VB12 - [facility] Vitamin B12  (Send-Out)              Procedures Ordered:       REFER  Referral to Specialist or Other Facility  (Send-Out)              Other Orders:         Depression screen negative  (In-House)            1101F  Pt screen for fall risk; document no falls in past year or only 1 fall w/o injury in past year (FLORINDA)  (In-House)            1123F  Advance Care Planning discussed and doc; advance care plan or surrogate decision maker doc. in MR  (Send-Out)              Subsequent Annual Well Visit Medicare  (x1)                  Plan:         Encounter for general adult medical examination without abnormal  findings        RECOMMENDATIONS given include: See attached wellness paperwork.  She seems well.  We will update labs and refill needed medications..  MIPS PHQ-9 Depression Screening: Completed form scanned and in chart; Total Score 1; Negative Depression Screen ADVANCE DIRECTIVES (Please update in PMH): Living will           Orders:         Depression screen negative  (In-House)            1101F  Pt screen for fall risk; document no falls in past year or only 1 fall w/o injury in past year (FLORINDA)  (In-House)            3017F  Colorectal CA screen results documented and reviewed (PV)  (In-House)            1123F  Advance Care Planning discussed and doc; advance care plan or surrogate decision maker doc. in MR  (Send-Out)              Other hypoparathyroidismAs above.        Chronic atrial fibrillation    LABORATORY:  Labs ordered to be performed today include CBC W/O DIFF and Comprehensive metabolic panel.            Prescriptions:       [Refilled] cyanocobalamin (vitamin B-12) 1,000 mcg/mL injection Solution [inject 1 milliliter (1,000 mcg) by intramuscular route once a month], #10 (ten) milliliters, Refills: 1 (one)       [Refilled] raloxifene 60 mg oral tablet [TAKE 1 TABLET DAILY], #90 (ninety) tablets, Refills: 1 (one)       [Refilled] Pradaxa 150 mg oral capsule [TAKE 1 CAPSULE TWICE DAILY], #180 (one hundred and eighty) capsules, Refills: 1 (one)       [Refilled] dilTIAZem HCl 120 mg oral Capsule, Extended Release 24 hr [TAKE 1 CAPSULE ONCE DAILY], #90 (ninety) capsules, Refills: 1 (one)       [Refilled] doxycycline hyclate 100 mg oral capsule [TAKE 1 CAPSULE TWICE DAILY], #180 (one hundred and eighty) capsules, Refills: 1 (one)       [Refilled] Klor-Con M20 20 mEq oral Tablet, Extended Release Particles/Crystals [TAKE 1 TABLET TWICE A DAY], #180 (one hundred and eighty) tablets, Refills: 1 (one)       [Refilled] sertraline 100 mg oral tablet [TAKE 1 TABLET DAILY], #90 (ninety) tablets, Refills: 1 (one)        [Refilled] Synthroid 175 mcg oral tablet [TAKE 1 TABLET ONCE DAILY   (PLEASE REPLACE 200MCG     DOSE)], #90 (ninety) tablets, Refills: 1 (one)       [Refilled] pravastatin 80 mg oral tablet [TAKE 1 TABLET ONCE DAILY], #90 (ninety) tablets, Refills: 1 (one)           Orders:       49314  BDCB2 - [facility] CBC w/o diff  (Send-Out)            37562  COMP - [facility] Comp. Metabolic Panel  (Send-Out)              Mixed hyperlipidemia    LABORATORY:  Labs ordered to be performed today include CK, total and lipid panel.            Orders:       02500  CK - [facility]- CK total  (Send-Out)            39357  LPDP - [facility] Lipid Panel  (Send-Out)              Essential (primary) hypertensionAs above.        Other specified hypothyroidism    LABORATORY:  Labs ordered to be performed today include Thyroid Panel.            Orders:       47177  THYII - [facility] Thyroid panel with TSH (73772, 12299)  (Send-Out)              Neoplasm of uncertain behavior of skin        REFERRALS:  Referral initiated to a dermatologist.            Orders:       REFER  Referral to Specialist or Other Facility  (Send-Out)              Other vitamin B12 deficiency anemias          Orders:       01140  [address] Vitamin B12  (Send-Out)                  Charge Capture:         Primary Diagnosis:     Z00.00  Encounter for general adult medical examination without abnormal findings           Orders:      05400  Preventive medicine, established patient, age 65+ years  (In-House)              Subsequent Annual Well Visit Medicare  (x1)          Depression screen negative  (In-House)            1101F  Pt screen for fall risk; document no falls in past year or only 1 fall w/o injury in past year (FLORINDA)  (In-House)            3017F  Colorectal CA screen results documented and reviewed (PV)  (In-House)              E20.8  Other hypoparathyroidism           Orders:      07643-01  Office/outpatient visit; established patient, level 4  (In-House)              I48.2   Chronic atrial fibrillation     E78.2  Mixed hyperlipidemia     I10  Essential (primary) hypertension     E03.8  Other specified hypothyroidism     D48.5  Neoplasm of uncertain behavior of skin     D51.8  Other vitamin B12 deficiency anemias

## 2021-06-21 RX ORDER — RALOXIFENE HYDROCHLORIDE 60 MG/1
TABLET, FILM COATED ORAL
Qty: 90 TABLET | Refills: 1 | Status: SHIPPED | OUTPATIENT
Start: 2021-06-21 | End: 2021-11-30

## 2021-06-23 DIAGNOSIS — I48.0 PAROXYSMAL A-FIB (HCC): Primary | ICD-10-CM

## 2021-06-23 RX ORDER — DABIGATRAN ETEXILATE 150 MG/1
150 CAPSULE ORAL 2 TIMES DAILY
Qty: 180 CAPSULE | Refills: 1 | Status: SHIPPED | OUTPATIENT
Start: 2021-06-23 | End: 2021-11-30

## 2021-06-23 NOTE — TELEPHONE ENCOUNTER
This was previously refilled in May in [facility].  I have gone ahead and refilled again though.  Thanks.

## 2021-07-01 VITALS
BODY MASS INDEX: 26.71 KG/M2 | TEMPERATURE: 96.9 F | HEIGHT: 67 IN | SYSTOLIC BLOOD PRESSURE: 156 MMHG | WEIGHT: 170.2 LBS | DIASTOLIC BLOOD PRESSURE: 86 MMHG | HEART RATE: 79 BPM

## 2021-07-01 VITALS
WEIGHT: 169.6 LBS | DIASTOLIC BLOOD PRESSURE: 80 MMHG | HEIGHT: 67 IN | HEART RATE: 76 BPM | SYSTOLIC BLOOD PRESSURE: 150 MMHG | TEMPERATURE: 98 F | BODY MASS INDEX: 26.62 KG/M2

## 2021-07-01 VITALS
HEIGHT: 67 IN | BODY MASS INDEX: 26.63 KG/M2 | TEMPERATURE: 97.1 F | HEART RATE: 76 BPM | WEIGHT: 169.7 LBS | DIASTOLIC BLOOD PRESSURE: 77 MMHG | SYSTOLIC BLOOD PRESSURE: 134 MMHG

## 2021-07-01 VITALS
TEMPERATURE: 96 F | DIASTOLIC BLOOD PRESSURE: 80 MMHG | HEIGHT: 67 IN | HEART RATE: 78 BPM | WEIGHT: 170.4 LBS | SYSTOLIC BLOOD PRESSURE: 148 MMHG | BODY MASS INDEX: 26.74 KG/M2

## 2021-07-01 VITALS
DIASTOLIC BLOOD PRESSURE: 86 MMHG | SYSTOLIC BLOOD PRESSURE: 132 MMHG | WEIGHT: 159 LBS | TEMPERATURE: 98.5 F | HEART RATE: 77 BPM | BODY MASS INDEX: 24.96 KG/M2 | HEIGHT: 67 IN

## 2021-07-02 VITALS
TEMPERATURE: 97.2 F | DIASTOLIC BLOOD PRESSURE: 74 MMHG | HEART RATE: 80 BPM | SYSTOLIC BLOOD PRESSURE: 138 MMHG | HEIGHT: 67 IN | BODY MASS INDEX: 24.99 KG/M2 | WEIGHT: 159.2 LBS

## 2021-07-02 VITALS
BODY MASS INDEX: 24.74 KG/M2 | TEMPERATURE: 97.8 F | HEIGHT: 67 IN | DIASTOLIC BLOOD PRESSURE: 88 MMHG | HEART RATE: 82 BPM | WEIGHT: 157.6 LBS | SYSTOLIC BLOOD PRESSURE: 136 MMHG

## 2021-07-02 VITALS
HEIGHT: 67 IN | SYSTOLIC BLOOD PRESSURE: 143 MMHG | WEIGHT: 158 LBS | HEART RATE: 66 BPM | TEMPERATURE: 97.5 F | DIASTOLIC BLOOD PRESSURE: 72 MMHG | BODY MASS INDEX: 24.8 KG/M2

## 2021-07-02 VITALS
HEART RATE: 81 BPM | WEIGHT: 158 LBS | SYSTOLIC BLOOD PRESSURE: 131 MMHG | TEMPERATURE: 97.5 F | BODY MASS INDEX: 24.8 KG/M2 | DIASTOLIC BLOOD PRESSURE: 74 MMHG | HEIGHT: 67 IN

## 2021-07-19 RX ORDER — CALCITRIOL 0.5 UG/1
CAPSULE, LIQUID FILLED ORAL
Qty: 90 CAPSULE | Refills: 1 | Status: SHIPPED | OUTPATIENT
Start: 2021-07-19 | End: 2021-07-26 | Stop reason: SDUPTHER

## 2021-07-26 RX ORDER — CALCITRIOL 0.5 UG/1
0.5 CAPSULE, LIQUID FILLED ORAL 2 TIMES DAILY
Qty: 180 CAPSULE | Refills: 1 | Status: SHIPPED | OUTPATIENT
Start: 2021-07-26 | End: 2021-07-28 | Stop reason: SDUPTHER

## 2021-07-26 RX ORDER — CALCITRIOL 0.5 UG/1
0.5 CAPSULE, LIQUID FILLED ORAL 2 TIMES DAILY
Qty: 14 CAPSULE | Refills: 0 | Status: SHIPPED | OUTPATIENT
Start: 2021-07-26 | End: 2021-08-04

## 2021-07-26 NOTE — TELEPHONE ENCOUNTER
Before I send this, just clarify.  I will go ahead and send it for twice daily dosing to [facility] and a week at that dosing to [facility].  Is that correct?

## 2021-07-26 NOTE — TELEPHONE ENCOUNTER
Caller: Aimee Landis    Relationship: Emergency Contact    Best call back number: [phone]    Medication needed:   Requested Prescriptions     Pending Prescriptions Disp Refills   • calcitriol (ROCALTROL) 0.5 MCG capsule 90 capsule 1       When do you need the refill by: ASAP    What additional details did the patient provide when requesting the medication: PATIENTS DAUGHTER STATED THAT DR. BLACKWOOD HAD CHANGED THIS DOSAGE FROM 1 A DAY TO 2 A DAY, BUT THAT THE AMOUNT RECEIVED ON THE LAST REFILL DID NOT REFLECT THIS AND THE PATIENT HAS RUN OUT OF THE MEDICATION PREMATURELY. PATIENT IS REQUESTING A WEEKS SUPPLY BE SENT IN TO THE PHARMACY TO GET HER TO THE NEXT FULL REFILL ON 8/2. PLEASE ADVISE IF WE ARE ABLE     Does the patient have less than a 3 day supply:  [x] Yes  [] No    What is the patient's preferred pharmacy:  [facility], [place] - [address] [facility] - [phone] [facility] [phone]   [phone]

## 2021-07-28 ENCOUNTER — OFFICE VISIT (OUTPATIENT)
Dept: FAMILY MEDICINE CLINIC | Age: 86
End: 2021-07-28

## 2021-07-28 VITALS
BODY MASS INDEX: 24.53 KG/M2 | DIASTOLIC BLOOD PRESSURE: 84 MMHG | TEMPERATURE: 97.6 F | HEART RATE: 69 BPM | WEIGHT: 152.6 LBS | SYSTOLIC BLOOD PRESSURE: 147 MMHG | HEIGHT: 66 IN

## 2021-07-28 DIAGNOSIS — B02.9 HERPES ZOSTER WITHOUT COMPLICATION: Primary | ICD-10-CM

## 2021-07-28 PROCEDURE — 99213 OFFICE O/P EST LOW 20 MIN: CPT | Performed by: NURSE PRACTITIONER

## 2021-07-28 RX ORDER — CYANOCOBALAMIN 1000 UG/ML
1000 INJECTION, SOLUTION INTRAMUSCULAR; SUBCUTANEOUS
COMMUNITY
Start: 2021-05-25 | End: 2022-05-09

## 2021-07-28 RX ORDER — LEVOTHYROXINE SODIUM 175 MCG
1 TABLET ORAL DAILY
COMMUNITY
Start: 2021-04-21 | End: 2021-12-14 | Stop reason: SDUPTHER

## 2021-07-28 RX ORDER — PREDNISONE 10 MG/1
TABLET ORAL
Qty: 35 TABLET | Refills: 0 | Status: SHIPPED | OUTPATIENT
Start: 2021-07-28 | End: 2021-08-12

## 2021-07-28 RX ORDER — DILTIAZEM HYDROCHLORIDE 120 MG/1
1 CAPSULE, COATED, EXTENDED RELEASE ORAL DAILY
COMMUNITY
Start: 2021-06-23 | End: 2021-11-30

## 2021-07-28 RX ORDER — DOXYCYCLINE HYCLATE 100 MG/1
1 CAPSULE ORAL 2 TIMES DAILY
COMMUNITY
Start: 2021-06-23 | End: 2021-12-10 | Stop reason: SDUPTHER

## 2021-07-28 RX ORDER — VALACYCLOVIR HYDROCHLORIDE 1 G/1
1000 TABLET, FILM COATED ORAL 3 TIMES DAILY
Qty: 21 TABLET | Refills: 0 | Status: SHIPPED | OUTPATIENT
Start: 2021-07-28 | End: 2021-08-04

## 2021-07-28 RX ORDER — PRAVASTATIN SODIUM 80 MG/1
1 TABLET ORAL DAILY
COMMUNITY
Start: 2021-04-23 | End: 2021-12-14 | Stop reason: SDUPTHER

## 2021-07-28 NOTE — PROGRESS NOTES
Frostbite  Frostbite is a freezing injury to the body's tissues caused by prolonged exposure to cold. It can cause permanent damage to the body. The most common places affected by frostbite are the fingers, toes, cheeks, chin, ears, and nose. Ice put directly on the skin and left too long can also lead to frostbite.  Frostbite causes the skin to turn white, grey, or blue-white. The skin may feel cold and hard. The body part often loses feeling and becomes completely numb. The skin may peel. Clear or blood-filled blisters may form. As the damage gets worse, the skin may turn black.  The treatment for frostbite is careful rewarming. In the hospital, this is done using warm water. During this process, the frostbitten part will start to throb. Pain medicine will likely be given to help make the process less painful.  Rewarmed tissue will be watched to see if it recovers. If tissue dies, it may need to be removed with surgery.    The affected body part may throb for weeks to months. Tingling or electric shock feelings may also be felt. There may be cold sensitivity, chronic numbness, chronic pain, and other symptoms that can last years.  Home care  · Care for the injured body part as directed by your healthcare provider. Protect the affected part from cold and other injury.  · Unless another medicine was prescribed, you can take over-the-counter pain medicines.  · As the tissue heals, watch for the signs of infection listed below.  · Avoid alcohol and smoking. These affect your blood vessels.  Follow-up care  Follow up with your healthcare provider as advised. Further evaluation of the affected part may be needed.  Protect the injured part from any exposure to cold for at least 6 months to a year or longer. The affected part is likely to always be more sensitive to damage from cold.  Preventing frostbite  To prevent frostbite, do the following in cold weather:  · Dress for the weather. Wear enough layers to keep you  "Chief Complaint  Rash (on back)    Subjective          Becky Zavala presents to [facility]    Trinity is here today with complaints of rash. First noticed  yesterday. She notes itchy.  Has applied calamine lotion.        Objective   Vital Signs:   /84 (BP Location: Right arm, Patient Position: Sitting)   Pulse 69   Temp 97.6 °F (36.4 °C) (Oral)   Ht 168.9 cm (66.5\")   Wt 69.2 kg (152 lb 9.6 oz)   BMI 24.26 kg/m²     Physical Exam  Vitals reviewed.   Constitutional:       General: She is not in acute distress.     Appearance: Normal appearance. She is well-developed.   HENT:      Head: Normocephalic and atraumatic.   Cardiovascular:      Rate and Rhythm: Normal rate and regular rhythm.   Pulmonary:      Effort: Pulmonary effort is normal.      Breath sounds: Normal breath sounds.   Skin:     Findings: Rash present.          Neurological:      Mental Status: She is alert and oriented to person, place, and time.   Psychiatric:         Mood and Affect: Mood and affect normal.          Result Review :              Assessment and Plan    Diagnoses and all orders for this visit:    1. Herpes zoster without complication (Primary)  Comments:  May continue calamine lotion.  Capsaicin ointment.  Cool compresses.  Orders:  -     valACYclovir (VALTREX) 1000 MG tablet; Take 1 tablet by mouth 3 (Three) Times a Day for 7 days.  Dispense: 21 tablet; Refill: 0  -     predniSONE (DELTASONE) 10 MG tablet; Take 2 tablets by mouth 2 (Two) Times a Day for 5 days, THEN 1 tablet 2 (Two) Times a Day for 5 days, THEN 1 tablet Daily for 5 days.  Dispense: 35 tablet; Refill: 0        Follow Up    Return for As needed for persistent or worsening symptoms, Chronic visit follow up.  Patient was given instructions and counseling regarding her condition or for health maintenance advice. Please see specific information pulled into the AVS if appropriate.   " warm. Cover exposed body parts to protect them from the cold.  · Eat enough food.  · Avoid alcohol and smoking. They make the skin more sensitive to cold.  · Avoid getting wet.  · Carry emergency supplies when you are out in the elements.  · If you use an ice pack, wrap it in a thin towel, and only use it for up to 15 minutes every 1-2 hours.  When to seek medical advice  Call your healthcare provider if you have:  · Signs of infection:  ¨ Increasing pain, redness, or swelling  ¨ Pus coming from the wound  ¨ Fever of 100.4°F (38°C) or higher  © 0838-3480 [employer]. [address] [place] [address], [place] [address]. All rights reserved. This information is not intended as a substitute for professional medical care. Always follow your healthcare professional's instructions.

## 2021-08-03 ENCOUNTER — PATIENT MESSAGE (OUTPATIENT)
Dept: FAMILY MEDICINE CLINIC | Age: 86
End: 2021-08-03

## 2021-08-04 RX ORDER — CALCITRIOL 0.5 UG/1
CAPSULE, LIQUID FILLED ORAL
Qty: 180 CAPSULE | Refills: 1 | Status: SHIPPED | OUTPATIENT
Start: 2021-08-04 | End: 2021-08-08 | Stop reason: SDUPTHER

## 2021-08-08 RX ORDER — CALCITRIOL 0.5 UG/1
0.5 CAPSULE, LIQUID FILLED ORAL 2 TIMES DAILY
Qty: 28 CAPSULE | Refills: 0 | Status: SHIPPED | OUTPATIENT
Start: 2021-08-08 | End: 2021-12-14 | Stop reason: SDUPTHER

## 2021-08-09 NOTE — TELEPHONE ENCOUNTER
From: Becky Zavala  To: Surjit Gutierrez MD  Sent: 8/3/2021 1:54 PM EDT  Subject: Prescription Question    We are still having an issue getting the Calcitriol prescription from [facility]. They now say the medicine will not ship until August 9. I thought I would receive it this week. I had asked for you to send one week's prescription to [facility], but it now looks like we are going to need you to send another one for 2 weeks, please. Thank you !     If you have any questions, you can call Aimee (Becky's daughter) at [phone].

## 2021-11-30 DIAGNOSIS — I48.0 PAROXYSMAL A-FIB (HCC): ICD-10-CM

## 2021-11-30 RX ORDER — ATENOLOL 100 MG/1
TABLET ORAL
Qty: 180 CAPSULE | Refills: 0 | Status: SHIPPED | OUTPATIENT
Start: 2021-11-30 | End: 2021-12-14 | Stop reason: SDUPTHER

## 2021-11-30 RX ORDER — RALOXIFENE HYDROCHLORIDE 60 MG/1
TABLET, FILM COATED ORAL
Qty: 90 TABLET | Refills: 0 | Status: SHIPPED | OUTPATIENT
Start: 2021-11-30 | End: 2021-12-14 | Stop reason: SDUPTHER

## 2021-11-30 RX ORDER — DILTIAZEM HYDROCHLORIDE 120 MG/1
CAPSULE, COATED, EXTENDED RELEASE ORAL
Qty: 90 CAPSULE | Refills: 0 | Status: SHIPPED | OUTPATIENT
Start: 2021-11-30 | End: 2021-12-14 | Stop reason: SDUPTHER

## 2021-12-10 ENCOUNTER — OFFICE VISIT (OUTPATIENT)
Dept: INFECTIOUS DISEASES | Facility: CLINIC | Age: 86
End: 2021-12-10

## 2021-12-10 VITALS
HEART RATE: 77 BPM | DIASTOLIC BLOOD PRESSURE: 82 MMHG | HEIGHT: 67 IN | WEIGHT: 145.8 LBS | TEMPERATURE: 98.5 F | SYSTOLIC BLOOD PRESSURE: 151 MMHG | BODY MASS INDEX: 22.88 KG/M2

## 2021-12-10 DIAGNOSIS — A49.01 MSSA (METHICILLIN SUSCEPTIBLE STAPHYLOCOCCUS AUREUS) INFECTION: Primary | ICD-10-CM

## 2021-12-10 DIAGNOSIS — I72.9 MYCOTIC ANEURYSM (HCC): ICD-10-CM

## 2021-12-10 DIAGNOSIS — Z79.2 LONG TERM (CURRENT) USE OF ANTIBIOTICS: ICD-10-CM

## 2021-12-10 PROCEDURE — 99213 OFFICE O/P EST LOW 20 MIN: CPT | Performed by: INTERNAL MEDICINE

## 2021-12-10 RX ORDER — DOXYCYCLINE HYCLATE 100 MG/1
100 CAPSULE ORAL 2 TIMES DAILY
Qty: 180 CAPSULE | Refills: 3 | Status: SHIPPED | OUTPATIENT
Start: 2021-12-10 | End: 2022-03-10

## 2021-12-10 RX ORDER — PHENOL 1.4 %
10 AEROSOL, SPRAY (ML) MUCOUS MEMBRANE
COMMUNITY

## 2021-12-10 NOTE — PROGRESS NOTES
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 She remains on doxycycline suppression without side effects. There is no erythema or drainage from her incisions. They are healed. No fevers, chills, or sweats. She continues to follow with Dr Ott for her vascular needs. She sees him yearly and has received good reports.  She will see her PCP next week and have labs done there.     She is fully vaccinated against COVID-19 with Pfizer. She has also received her booster.      Review of Systems: no n/v/d    Medications:     Current Outpatient Medications:   •  calcitriol (ROCALTROL) 0.5 MCG capsule, Take 1 capsule by mouth 2 (Two) Times a Day., Disp: 28 capsule, Rfl: 0  •  cholecalciferol (VITAMIN D3) 1000 UNITS tablet, Take 2,000 Units by mouth Daily. HOLD PRIOR TO SURG, Disp: , Rfl:   •  cyanocobalamin 1000 MCG/ML injection, Inject 1,000 mcg under the skin into the appropriate area as directed Every 30 (Thirty) Days., Disp: , Rfl:   •  diltiazem (CARDIZEM) 120 MG tablet, Take 120 mg by mouth Daily., Disp: , Rfl:   •  dilTIAZem CD (CARDIZEM CD) 120 MG 24 hr capsule, TAKE 1 CAPSULE ONCE DAILY, Disp: 90 capsule, Rfl: 0  •  doxycycline (VIBRAMYCIN) 100 MG capsule, Take 1 capsule by mouth 2 (two) times a day., Disp: , Rfl:   •  Multiple Vitamins-Minerals (ICAPS AREDS 2 PO), Take 1 capsule by mouth 2 (Two) Times a Day., Disp: , Rfl:   •  potassium chloride (K-DUR,KLOR-CON) 20 MEQ CR tablet, Take 20 mEq by mouth 2 (Two) Times a Day., Disp: , Rfl:   •  Pradaxa 150 MG capsu, TAKE 1 CAPSULE TWICE DAILY, Disp: 180 capsule, Rfl: 0  •  pravastatin (PRAVACHOL) 80 MG tablet, Take 1 tablet by mouth Daily., Disp: , Rfl:   •  raloxifene (EVISTA) 60 MG tablet, TAKE 1 TABLET DAILY, Disp: 90 tablet, Rfl: 0  •  sertraline (ZOLOFT) 100 MG tablet, Take 100 mg by mouth Daily., Disp: , Rfl:   •  Synthroid  "175 MCG tablet, Take 1 tablet by mouth Daily., Disp: , Rfl:       OBJECTIVE:  /82   Pulse 77   Temp 98.5 °F (36.9 °C)   Ht 168.9 cm (66.5\")   Wt 66.1 kg (145 lb 12.8 oz)   BMI 23.18 kg/m²     General: awake, alert, NAD, very nice, hard of hearing  Eyes: no scleral icterus  ENT: wearing mask  Cardiovascular: NR  Respiratory: normal work of breathing on ambient air  Skin: No rash  Psychiatric: Normal mood and affect     DIAGNOSTICS:  CBC, CMP, CPK reviewed today    Lab Results   Component Value Date    WBC 7.92 05/24/2021    HGB 13.5 05/24/2021    HCT 41.9 05/24/2021    MCV 89.0 05/24/2021     05/24/2021     Lab Results   Component Value Date    GLUCOSE 120 (H) 05/24/2021    BUN 21 05/24/2021    CREATININE 1.11 (H) 05/24/2021    EGFRIFNONA 90 06/28/2019    BCR 19 05/24/2021    K 4.8 05/24/2021    CO2 29 05/24/2021    CALCIUM 9.7 05/24/2021    ALBUMIN 3.9 05/24/2021    LABIL2 1.5 05/24/2021    AST 17 05/24/2021    ALT 14 05/24/2021     Lab Results   Component Value Date    CKTOTAL 38 05/24/2021    TROPONINT <0.010 10/09/2016     Lab Results   Component Value Date    CRP 0.12 06/04/2018     Microbiology:  10/2/16 BCx: negative  10/3/16 BCx: negative  10/6/16 BCx: negative  10/7/16 Tissue Cx Femoral Artery: MSSA (also sensitive to doxy, bactrim; R - clinda)  12/9/16 Wound Cx: negative      ASSESSMENT/PLAN:  1. 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 I told her to ask her PCP about this. It would be fine with me.   -keep f/u with Dr Ean Ott (vascular) as scheduled  -no labs " today since they will be done w/ PCP next week     2. Long term use of antibiotics     F/U as needed if PCP takes over RX doxycycline. If not, she can follow-up w/ me in 12 months. She prefers morning appts so it could coincide w/ Dr Ott's appt and that would be fine w/ me.

## 2021-12-14 ENCOUNTER — LAB (OUTPATIENT)
Dept: LAB | Facility: HOSPITAL | Age: 86
End: 2021-12-14

## 2021-12-14 ENCOUNTER — OFFICE VISIT (OUTPATIENT)
Dept: FAMILY MEDICINE CLINIC | Age: 86
End: 2021-12-14

## 2021-12-14 VITALS
WEIGHT: 145.4 LBS | TEMPERATURE: 97.5 F | BODY MASS INDEX: 23.37 KG/M2 | HEART RATE: 61 BPM | DIASTOLIC BLOOD PRESSURE: 78 MMHG | HEIGHT: 66 IN | SYSTOLIC BLOOD PRESSURE: 149 MMHG

## 2021-12-14 DIAGNOSIS — E20.9 HYPOPARATHYROIDISM, UNSPECIFIED HYPOPARATHYROIDISM TYPE (HCC): ICD-10-CM

## 2021-12-14 DIAGNOSIS — I48.0 PAROXYSMAL A-FIB (HCC): ICD-10-CM

## 2021-12-14 DIAGNOSIS — E03.9 ACQUIRED HYPOTHYROIDISM: ICD-10-CM

## 2021-12-14 DIAGNOSIS — E78.2 MIXED HYPERLIPIDEMIA: ICD-10-CM

## 2021-12-14 DIAGNOSIS — E53.8 B12 DEFICIENCY: ICD-10-CM

## 2021-12-14 DIAGNOSIS — E20.9 HYPOPARATHYROIDISM, UNSPECIFIED HYPOPARATHYROIDISM TYPE (HCC): Primary | ICD-10-CM

## 2021-12-14 DIAGNOSIS — R73.9 HYPERGLYCEMIA: ICD-10-CM

## 2021-12-14 LAB
ALBUMIN SERPL-MCNC: 3.9 G/DL (ref 3.5–5.2)
ALBUMIN/GLOB SERPL: 1.7 G/DL
ALP SERPL-CCNC: 63 U/L (ref 39–117)
ALT SERPL W P-5'-P-CCNC: 18 U/L (ref 1–33)
ANION GAP SERPL CALCULATED.3IONS-SCNC: 6.4 MMOL/L (ref 5–15)
AST SERPL-CCNC: 16 U/L (ref 1–32)
BILIRUB SERPL-MCNC: 0.6 MG/DL (ref 0–1.2)
BUN SERPL-MCNC: 22 MG/DL (ref 8–23)
BUN/CREAT SERPL: 25.9 (ref 7–25)
CALCIUM SPEC-SCNC: 9.4 MG/DL (ref 8.6–10.5)
CHLORIDE SERPL-SCNC: 107 MMOL/L (ref 98–107)
CO2 SERPL-SCNC: 30.6 MMOL/L (ref 22–29)
CREAT SERPL-MCNC: 0.85 MG/DL (ref 0.57–1)
DEPRECATED RDW RBC AUTO: 43 FL (ref 37–54)
ERYTHROCYTE [DISTWIDTH] IN BLOOD BY AUTOMATED COUNT: 13.3 % (ref 12.3–15.4)
FOLATE SERPL-MCNC: 12.1 NG/ML (ref 4.78–24.2)
GFR SERPL CREATININE-BSD FRML MDRD: 63 ML/MIN/1.73
GLOBULIN UR ELPH-MCNC: 2.3 GM/DL
GLUCOSE SERPL-MCNC: 116 MG/DL (ref 65–99)
HCT VFR BLD AUTO: 41 % (ref 34–46.6)
HGB BLD-MCNC: 13.1 G/DL (ref 12–15.9)
MCH RBC QN AUTO: 28.5 PG (ref 26.6–33)
MCHC RBC AUTO-ENTMCNC: 32 G/DL (ref 31.5–35.7)
MCV RBC AUTO: 89.3 FL (ref 79–97)
PLATELET # BLD AUTO: 138 10*3/MM3 (ref 140–450)
PMV BLD AUTO: 10.9 FL (ref 6–12)
POTASSIUM SERPL-SCNC: 4.3 MMOL/L (ref 3.5–5.2)
PROT SERPL-MCNC: 6.2 G/DL (ref 6–8.5)
PTH-INTACT SERPL-MCNC: 4.1 PG/ML (ref 15–65)
RBC # BLD AUTO: 4.59 10*6/MM3 (ref 3.77–5.28)
SODIUM SERPL-SCNC: 144 MMOL/L (ref 136–145)
TSH SERPL DL<=0.05 MIU/L-ACNC: 0.75 UIU/ML (ref 0.27–4.2)
VIT B12 BLD-MCNC: 1705 PG/ML (ref 211–946)
WBC NRBC COR # BLD: 5.61 10*3/MM3 (ref 3.4–10.8)

## 2021-12-14 PROCEDURE — 99214 OFFICE O/P EST MOD 30 MIN: CPT | Performed by: FAMILY MEDICINE

## 2021-12-14 PROCEDURE — 84443 ASSAY THYROID STIM HORMONE: CPT

## 2021-12-14 PROCEDURE — 36415 COLL VENOUS BLD VENIPUNCTURE: CPT

## 2021-12-14 PROCEDURE — 85027 COMPLETE CBC AUTOMATED: CPT

## 2021-12-14 PROCEDURE — 83036 HEMOGLOBIN GLYCOSYLATED A1C: CPT | Performed by: FAMILY MEDICINE

## 2021-12-14 PROCEDURE — 83970 ASSAY OF PARATHORMONE: CPT

## 2021-12-14 PROCEDURE — 80053 COMPREHEN METABOLIC PANEL: CPT

## 2021-12-14 PROCEDURE — 82607 VITAMIN B-12: CPT

## 2021-12-14 PROCEDURE — 82746 ASSAY OF FOLIC ACID SERUM: CPT

## 2021-12-14 RX ORDER — CALCITRIOL 0.5 UG/1
0.5 CAPSULE, LIQUID FILLED ORAL 2 TIMES DAILY
Qty: 180 CAPSULE | Refills: 1 | Status: SHIPPED | OUTPATIENT
Start: 2021-12-14 | End: 2022-05-02

## 2021-12-14 RX ORDER — RALOXIFENE HYDROCHLORIDE 60 MG/1
60 TABLET, FILM COATED ORAL DAILY
Qty: 90 TABLET | Refills: 1 | Status: SHIPPED | OUTPATIENT
Start: 2021-12-14 | End: 2022-06-17 | Stop reason: SDUPTHER

## 2021-12-14 RX ORDER — LEVOTHYROXINE SODIUM 175 MCG
175 TABLET ORAL DAILY
Qty: 90 TABLET | Refills: 1 | Status: SHIPPED | OUTPATIENT
Start: 2021-12-14 | End: 2022-06-17 | Stop reason: SDUPTHER

## 2021-12-14 RX ORDER — SERTRALINE HYDROCHLORIDE 100 MG/1
100 TABLET, FILM COATED ORAL DAILY
Qty: 90 TABLET | Refills: 1 | Status: SHIPPED | OUTPATIENT
Start: 2021-12-14 | End: 2022-06-17 | Stop reason: SDUPTHER

## 2021-12-14 RX ORDER — DILTIAZEM HYDROCHLORIDE 120 MG/1
120 CAPSULE, COATED, EXTENDED RELEASE ORAL DAILY
Qty: 90 CAPSULE | Refills: 1 | Status: SHIPPED | OUTPATIENT
Start: 2021-12-14 | End: 2022-06-17 | Stop reason: SDUPTHER

## 2021-12-14 RX ORDER — POTASSIUM CHLORIDE 20 MEQ/1
20 TABLET, EXTENDED RELEASE ORAL 2 TIMES DAILY
Qty: 180 TABLET | Refills: 1 | Status: SHIPPED | OUTPATIENT
Start: 2021-12-14 | End: 2022-06-17 | Stop reason: SDUPTHER

## 2021-12-14 RX ORDER — DABIGATRAN ETEXILATE 150 MG/1
150 CAPSULE ORAL 2 TIMES DAILY
Qty: 180 CAPSULE | Refills: 1 | Status: SHIPPED | OUTPATIENT
Start: 2021-12-14 | End: 2022-06-17 | Stop reason: SDUPTHER

## 2021-12-14 RX ORDER — PRAVASTATIN SODIUM 80 MG/1
80 TABLET ORAL NIGHTLY
Qty: 90 TABLET | Refills: 1 | Status: SHIPPED | OUTPATIENT
Start: 2021-12-14 | End: 2022-06-17 | Stop reason: SDUPTHER

## 2021-12-14 NOTE — PATIENT INSTRUCTIONS
Atrial Fibrillation    Atrial fibrillation is a type of irregular or rapid heartbeat (arrhythmia). In atrial fibrillation, the top part of the heart (atria) beats in an irregular pattern. This makes the heart unable to pump blood normally and effectively.  The goal of treatment is to prevent blood clots from forming, control your heart rate, or restore your heartbeat to a normal rhythm. If this condition is not treated, it can cause serious problems, such as a weakened heart muscle (cardiomyopathy) or a stroke.  What are the causes?  This condition is often caused by medical conditions that damage the heart's electrical system. These include:  · High blood pressure (hypertension). This is the most common cause.  · Certain heart problems or conditions, such as heart failure, coronary artery disease, heart valve problems, or heart surgery.  · Diabetes.  · Overactive thyroid (hyperthyroidism).  · Obesity.  · Chronic kidney disease.  In some cases, the cause of this condition is not known.  What increases the risk?  This condition is more likely to develop in:  · Older people.  · People who smoke.  · Athletes who do endurance exercise.  · People who have a family history of atrial fibrillation.  · Men.  · People who use drugs.  · People who drink a lot of alcohol.  · People who have lung conditions, such as emphysema, pneumonia, or COPD.  · People who have obstructive sleep apnea.  What are the signs or symptoms?  Symptoms of this condition include:  · A feeling that your heart is racing or beating irregularly.  · Discomfort or pain in your chest.  · Shortness of breath.  · Sudden light-headedness or weakness.  · Tiring easily during exercise or activity.  · Fatigue.  · Syncope (fainting).  · Sweating.  In some cases, there are no symptoms.  How is this diagnosed?  Your health care provider may detect atrial fibrillation when taking your pulse. If detected, this condition may be diagnosed with:  · An electrocardiogram  (ECG) to check electrical signals of the heart.  · An ambulatory cardiac monitor to record your heart's activity for a few days.  · A transthoracic echocardiogram (TTE) to create pictures of your heart.  · A transesophageal echocardiogram (RAD) to create even closer pictures of your heart.  · A stress test to check your blood supply while you exercise.  · Imaging tests, such as a CT scan or chest X-ray.  · Blood tests.  How is this treated?  Treatment depends on underlying conditions and how you feel when you experience atrial fibrillation. This condition may be treated with:  · Medicines to prevent blood clots or to treat heart rate or heart rhythm problems.  · Electrical cardioversion to reset the heart's rhythm.  · A pacemaker to correct abnormal heart rhythm.  · Ablation to remove the heart tissue that sends abnormal signals.  · Left atrial appendage closure to seal the area where blood clots can form.  In some cases, underlying conditions will be treated.  Follow these instructions at home:  Medicines  · Take over-the counter and prescription medicines only as told by your health care provider.  · Do not take any new medicines without talking to your health care provider.  · If you are taking blood thinners:  ? Talk with your health care provider before you take any medicines that contain aspirin or NSAIDs, such as ibuprofen. These medicines increase your risk for dangerous bleeding.  ? Take your medicine exactly as told, at the same time every day.  ? Avoid activities that could cause injury or bruising, and follow instructions about how to prevent falls.  ? Wear a medical alert bracelet or carry a card that lists what medicines you take.  Lifestyle         · Do not use any products that contain nicotine or tobacco, such as cigarettes, e-cigarettes, and chewing tobacco. If you need help quitting, ask your health care provider.  · Eat heart-healthy foods. Talk with a dietitian to make an eating plan that is  "right for you.  · Exercise regularly as told by your health care provider.  · Do not drink alcohol.  · Lose weight if you are overweight.  · Do not use drugs, including cannabis.  General instructions  · If you have obstructive sleep apnea, manage your condition as told by your health care provider.  · Do not use diet pills unless your health care provider approves. Diet pills can make heart problems worse.  · Keep all follow-up visits as told by your health care provider. This is important.  Contact a health care provider if you:  · Notice a change in the rate, rhythm, or strength of your heartbeat.  · Are taking a blood thinner and you notice more bruising.  · Tire more easily when you exercise or do heavy work.  · Have a sudden change in weight.  Get help right away if you have:    · Chest pain, abdominal pain, sweating, or weakness.  · Trouble breathing.  · Side effects of blood thinners, such as blood in your vomit, stool, or urine, or bleeding that cannot stop.  · Any symptoms of a stroke. \"BE FAST\" is an easy way to remember the main warning signs of a stroke:  ? B - Balance. Signs are dizziness, sudden trouble walking, or loss of balance.  ? E - Eyes. Signs are trouble seeing or a sudden change in vision.  ? F - Face. Signs are sudden weakness or numbness of the face, or the face or eyelid drooping on one side.  ? A - Arms. Signs are weakness or numbness in an arm. This happens suddenly and usually on one side of the body.  ? S - Speech. Signs are sudden trouble speaking, slurred speech, or trouble understanding what people say.  ? T - Time. Time to call emergency services. Write down what time symptoms started.  · Other signs of a stroke, such as:  ? A sudden, severe headache with no known cause.  ? Nausea or vomiting.  ? Seizure.  These symptoms may represent a serious problem that is an emergency. Do not wait to see if the symptoms will go away. Get medical help right away. Call your local emergency " services (911 in the [place] Do not drive yourself to the hospital.  Summary  · Atrial fibrillation is a type of irregular or rapid heartbeat (arrhythmia).  · Symptoms include a feeling that your heart is beating fast or irregularly.  · You may be given medicines to prevent blood clots or to treat heart rate or heart rhythm problems.  · Get help right away if you have signs or symptoms of a stroke.  · Get help right away if you cannot catch your breath or have chest pain or pressure.  This information is not intended to replace advice given to you by your health care provider. Make sure you discuss any questions you have with your health care provider.  Document Revised: 06/10/2020 Document Reviewed: 06/10/2020  Elsevier Patient Education © 2021 [employer]

## 2021-12-14 NOTE — PROGRESS NOTES
Becky Zavala presents to [facility].    Chief Complaint:  Follow up on hypoparathyroidism, atrial fibrillation, lipids, blood pressure, thyroid    Subjective       History of Present Illness:  Rigoberto is in today for follow-up on her usual care.  She has a history of hypoparathyroidism for which she continues to take calcitriol.  Her most recent calcium level was in a good range.        She also presents with history of chronic atrial fibrillation.  She remains on diltiazem and Pradaxa.  Her heart rate has been relatively well controlled I think.  They deny any bleeding issues.        Concerning mixed hyperlipidemia, current treatment includes Pravachol.  Compliance with treatment has been good.  She specifically denies associated symptoms, including muscle pain and weakness.      In regard to the other specified hypothyroidism, she is currently taking Synthroid, 175 mcg daily.  TSH was last checked 6 months ago.  She denies any related symptoms.        Review of Systems:  Review of Systems   Constitutional: Negative for chills and fever.   Respiratory: Negative for cough and shortness of breath.    Cardiovascular: Negative for chest pain and palpitations.   Gastrointestinal: Negative for abdominal pain, nausea and vomiting.        Objective   Medical History:  Past Medical History:   • AAA (abdominal aortic aneurysm) (HCC)   • Alzheimer's disease, unspecified (HCC)   • Anxiety   • Atrial fibrillation (HCC)   • Cataract   • Cellulitis   • Chronic atrial fibrillation (HCC)   • Chronic kidney disease   • COPD (chronic obstructive pulmonary disease) (HCC)   • Coronary artery disease   • Dementia (HCC)    SLIGHT PER DAUGHTER   • Disease of thyroid gland   • Diverticulitis of colon   • Essential (primary) hypertension   • Frequency of micturition   • Glaucoma   • Groin incision ulcer (HCC)    SMALL OPEN AREA TO LEFT GROIN   • Hemorrhage of anus and rectum   • History of heart attack   • HL  (hearing loss)   • Hyperlipidemia   • Hypocalcemia   • Hypothyroidism   • Macular degeneration   • MSSA (methicillin susceptible Staphylococcus aureus) infection    CAUSE OF ANEURSYM PER DAUGHTER STATED   • Myocardial infarction (HCC)   • Neck pain    LOWER    • Neoplasm of uncertain behavior of skin   • Neuropathy   • Osteopenia   • Osteoporosis   • Other hypoparathyroidism (HCC)   • Other vitamin B12 deficiency anemias   • PONV (postoperative nausea and vomiting)   • Popliteal aneurysm (HCC)    LEFT   • Pressure sore    LEFT HEEL DRESSING INTACT   • PVD (peripheral vascular disease) (HCC)   • UTI (urinary tract infection)   • Visual impairment     Past Surgical History:   • APPENDECTOMY   • CHOLECYSTECTOMY   • COLONOSCOPY   • COLONOSCOPY    NBIH, diverticulosis, multiple polyps benign per path    • ENDOSCOPY    normal    • EYE SURGERY   • FRACTURE SURGERY   • HYSTERECTOMY   • ORIF FEMUR FRACTURE    left tib fib   • SC REANEURYSM/GRFT INS,COMMON FEMORAL    Procedure: REPAIR OF LEFT MYCOTIC POPLITEAL ANEURYSM WITH ARTEGRAFT GRAFT, INTERPOSITION;  Surgeon: Randy Ott MD;  Location: [facility];  Service: Vascular   • SC VEIN IN SITU BYPASS GRAFT,FEM-POP    Procedure: RESECTION OF INFECTED LEFT FEMORAL ANEURYSM, EXTERNAL ILIAC TO SFA BYPASS WITH REIMPLANTATION PROFUNDA, AIF ARTERIOGRAM WITH LEFT LEG RUNOFF, SARTORIUS MUSCLE FLAP;  Surgeon: Randy Ott MD;  Location: [facility];  Service: Vascular   • THYROID SURGERY   • UPPER GASTROINTESTINAL ENDOSCOPY      Family History   Problem Relation Age of Onset   • Breast cancer Daughter    • Other Daughter         anaphalaxysis   • Cancer Daughter    • Cancer Daughter      Social History     Tobacco Use   • Smoking status: Never Smoker   • Smokeless tobacco: Never Used   Substance Use Topics   • Alcohol use: No       Health Maintenance Due   Topic Date Due   • ZOSTER VACCINE (1 of 2) Never done   • DXA SCAN  07/13/2014   • ANNUAL WELLNESS VISIT  Never done         Immunization History   Administered Date(s) Administered   • COVID-19 (PFIZER) 03/22/2021, 04/12/2021, 11/11/2021   • Flu Vaccine Intradermal Quad 18-64YR 01/23/2013   • Flu Vaccine Quad PF >18YRS 11/18/2016   • Fluzone High Dose =>65 Years ([employer] ONLY) 11/17/2017   • Influenza, Unspecified 05/24/2021   • Pneumococcal Conjugate 13-Valent (PCV13) 03/16/2015   • Pneumococcal Polysaccharide (PPSV23) 11/09/2010   • Td 07/08/2018   • Tdap 07/08/2018, 07/08/2018       Allergies   Allergen Reactions   • Ciprofloxacin Other (See Comments)     neck pain; lips and eyes swelled   • Other      ALL DRUGS IN THE FLUOROQUINOLONES        Medications:  Current Outpatient Medications on File Prior to Visit   Medication Sig   • cholecalciferol (VITAMIN D3) 1000 UNITS tablet Take 2,000 Units by mouth Daily. HOLD PRIOR TO SURG   • cyanocobalamin 1000 MCG/ML injection Inject 1,000 mcg under the skin into the appropriate area as directed Every 30 (Thirty) Days.   • doxycycline (VIBRAMYCIN) 100 MG capsule Take 1 capsule by mouth 2 (Two) Times a Day for 90 days.   • Melatonin 10 MG tablet Take 10 mg by mouth. PATIENT TAKES ONE EVERY EVENING/UNSURE IF TABLET OR CAPSULE   • Multiple Vitamins-Minerals (ICAPS AREDS 2 PO) Take 1 capsule by mouth 2 (Two) Times a Day.   • [DISCONTINUED] calcitriol (ROCALTROL) 0.5 MCG capsule Take 1 capsule by mouth 2 (Two) Times a Day.   • [DISCONTINUED] dilTIAZem CD (CARDIZEM CD) 120 MG 24 hr capsule TAKE 1 CAPSULE ONCE DAILY   • [DISCONTINUED] potassium chloride (K-DUR,KLOR-CON) 20 MEQ CR tablet Take 20 mEq by mouth 2 (Two) Times a Day.   • [DISCONTINUED] Pradaxa 150 MG capsu TAKE 1 CAPSULE TWICE DAILY   • [DISCONTINUED] pravastatin (PRAVACHOL) 80 MG tablet Take 1 tablet by mouth Daily.   • [DISCONTINUED] raloxifene (EVISTA) 60 MG tablet TAKE 1 TABLET DAILY   • [DISCONTINUED] sertraline (ZOLOFT) 100 MG tablet Take 100 mg by mouth Daily.   • [DISCONTINUED] Synthroid 175 MCG tablet Take 1 tablet by mouth  "Daily.   • [DISCONTINUED] diltiazem (CARDIZEM) 120 MG tablet Take 120 mg by mouth Daily.     No current facility-administered medications on file prior to visit.       Vital Signs:   /78 (BP Location: Right arm, Patient Position: Sitting)   Pulse 61   Temp 97.5 °F (36.4 °C) (Oral)   Ht 168.9 cm (66.5\")   Wt 66 kg (145 lb 6.4 oz)   BMI 23.12 kg/m²       Physical Exam:  Physical Exam  Vitals reviewed.   Constitutional:       General: She is not in acute distress.     Appearance: She is not ill-appearing.   Eyes:      Pupils: Pupils are equal, round, and reactive to light.   Neck:      Comments: No thyromegaly  Cardiovascular:      Rate and Rhythm: Normal rate and regular rhythm.   Pulmonary:      Effort: Pulmonary effort is normal.      Breath sounds: Normal breath sounds.   Abdominal:      General: There is no distension.      Palpations: Abdomen is soft.      Tenderness: There is no abdominal tenderness.   Musculoskeletal:      Cervical back: Neck supple.   Lymphadenopathy:      Cervical: No cervical adenopathy.   Skin:     Findings: No lesion or rash.   Neurological:      Mental Status: She is alert.         Result Review      The following data was reviewed by Surjit Gutierrez MD on 12/14/2021.  Lab Results   Component Value Date    WBC 7.92 05/24/2021    HGB 13.5 05/24/2021    HCT 41.9 05/24/2021    MCV 89.0 05/24/2021     05/24/2021     Lab Results   Component Value Date    GLUCOSE 120 (H) 05/24/2021    BUN 21 05/24/2021    CREATININE 1.11 (H) 05/24/2021     05/24/2021    K 4.8 05/24/2021     05/24/2021    CO2 29 05/24/2021    CALCIUM 9.7 05/24/2021    PROTEINTOT 6.5 05/24/2021    ALBUMIN 3.9 05/24/2021    ALT 14 05/24/2021    AST 17 05/24/2021    ALKPHOS 65 05/24/2021    BILITOT 0.83 05/24/2021    EGFRIFNONA 90 06/28/2019    GLOB 2.6 05/24/2021    AGRATIO 1.3 06/28/2019    BCR 19 05/24/2021    ANIONGAP 15 05/24/2021      Lab Results   Component Value Date    CHOL 98 " 10/09/2016    CHLPL 159 05/24/2021    TRIG 172 (H) 05/24/2021    HDL 34 (L) 05/24/2021    LDL 91 05/24/2021     Lab Results   Component Value Date    TSH 1.500 05/24/2021     Lab Results   Component Value Date    HGBA1C 5.4 01/13/2020            Assessment and Plan:   Trinity seems to be doing very well today.  We will go ahead and refill needed medications for her and update labs as noted below.  I am not expecting to make significant changes, but we will see where things stand.  They are going to hold off on flu vaccine today       Diagnoses and all orders for this visit:    1. Hypoparathyroidism, unspecified hypoparathyroidism type (HCC) (Primary)  -     calcitriol (ROCALTROL) 0.5 MCG capsule; Take 1 capsule by mouth 2 (Two) Times a Day.  Dispense: 180 capsule; Refill: 1  -     PTH, Intact; Future    2. Paroxysmal A-fib (HCC)  -     dabigatran etexilate (Pradaxa) 150 MG capsu; Take 1 capsule by mouth 2 (Two) Times a Day.  Dispense: 180 capsule; Refill: 1  -     dilTIAZem CD (CARDIZEM CD) 120 MG 24 hr capsule; Take 1 capsule by mouth Daily.  Dispense: 90 capsule; Refill: 1  -     potassium chloride (K-DUR,KLOR-CON) 20 MEQ CR tablet; Take 1 tablet by mouth 2 (Two) Times a Day.  Dispense: 180 tablet; Refill: 1  -     CBC (No Diff); Future  -     Comprehensive Metabolic Panel; Future    3. Mixed hyperlipidemia  -     pravastatin (PRAVACHOL) 80 MG tablet; Take 1 tablet by mouth Every Night.  Dispense: 90 tablet; Refill: 1  -     Comprehensive Metabolic Panel; Future    4. Acquired hypothyroidism  -     Synthroid 175 MCG tablet; Take 1 tablet by mouth Daily.  Dispense: 90 tablet; Refill: 1  -     TSH; Future    5. B12 deficiency  -     CBC (No Diff); Future  -     Vitamin B12 & Folate; Future    Other orders  -     raloxifene (EVISTA) 60 MG tablet; Take 1 tablet by mouth Daily.  Dispense: 90 tablet; Refill: 1  -     sertraline (ZOLOFT) 100 MG tablet; Take 1 tablet by mouth Daily.  Dispense: 90 tablet; Refill:  1          Follow Up   Return in about 6 months (around 6/14/2022).  Patient was given instructions and counseling regarding her condition or for health maintenance advice. Please see specific information pulled into the AVS if appropriate.

## 2021-12-15 LAB — HBA1C MFR BLD: 5.4 % (ref 4.8–5.6)

## 2021-12-27 DIAGNOSIS — I48.0 PAROXYSMAL A-FIB (HCC): ICD-10-CM

## 2021-12-27 RX ORDER — POTASSIUM CHLORIDE 1500 MG/1
TABLET, EXTENDED RELEASE ORAL
Qty: 180 TABLET | Refills: 1 | OUTPATIENT
Start: 2021-12-27

## 2022-03-28 ENCOUNTER — HOSPITAL ENCOUNTER (OUTPATIENT)
Dept: GENERAL RADIOLOGY | Facility: HOSPITAL | Age: 87
Discharge: HOME | End: 2022-03-28
Admitting: NURSE PRACTITIONER

## 2022-03-28 ENCOUNTER — OFFICE VISIT (OUTPATIENT)
Dept: FAMILY MEDICINE CLINIC | Age: 87
End: 2022-03-28

## 2022-03-28 VITALS
OXYGEN SATURATION: 98 % | HEIGHT: 67 IN | HEART RATE: 67 BPM | SYSTOLIC BLOOD PRESSURE: 164 MMHG | TEMPERATURE: 97.5 F | DIASTOLIC BLOOD PRESSURE: 98 MMHG | WEIGHT: 145.9 LBS | BODY MASS INDEX: 22.9 KG/M2

## 2022-03-28 DIAGNOSIS — R05.9 COUGH: Primary | ICD-10-CM

## 2022-03-28 DIAGNOSIS — R05.9 COUGH: ICD-10-CM

## 2022-03-28 LAB
EXPIRATION DATE: NORMAL
EXPIRATION DATE: NORMAL
FLUAV AG NPH QL: NEGATIVE
FLUBV AG NPH QL: NEGATIVE
INTERNAL CONTROL: NORMAL
INTERNAL CONTROL: NORMAL
Lab: NORMAL
Lab: NORMAL
SARS-COV-2 AG UPPER RESP QL IA.RAPID: NOT DETECTED

## 2022-03-28 PROCEDURE — 71046 X-RAY EXAM CHEST 2 VIEWS: CPT

## 2022-03-28 PROCEDURE — 87426 SARSCOV CORONAVIRUS AG IA: CPT | Performed by: NURSE PRACTITIONER

## 2022-03-28 PROCEDURE — 99213 OFFICE O/P EST LOW 20 MIN: CPT | Performed by: NURSE PRACTITIONER

## 2022-03-28 PROCEDURE — 87804 INFLUENZA ASSAY W/OPTIC: CPT | Performed by: NURSE PRACTITIONER

## 2022-03-28 RX ORDER — METHYLPREDNISOLONE 4 MG/1
TABLET ORAL
Qty: 21 EACH | Refills: 0 | Status: SHIPPED | OUTPATIENT
Start: 2022-03-28 | End: 2022-05-05

## 2022-03-28 RX ORDER — DOXYCYCLINE HYCLATE 100 MG/1
100 CAPSULE ORAL 2 TIMES DAILY
COMMUNITY
Start: 2022-03-20 | End: 2022-12-16 | Stop reason: SDUPTHER

## 2022-03-28 RX ORDER — AMOXICILLIN AND CLAVULANATE POTASSIUM 875; 125 MG/1; MG/1
1 TABLET, FILM COATED ORAL 2 TIMES DAILY
Qty: 20 TABLET | Refills: 0 | Status: SHIPPED | OUTPATIENT
Start: 2022-03-28 | End: 2022-04-07

## 2022-04-05 ENCOUNTER — TELEPHONE (OUTPATIENT)
Dept: FAMILY MEDICINE CLINIC | Age: 87
End: 2022-04-05

## 2022-04-05 DIAGNOSIS — J44.9 CHRONIC OBSTRUCTIVE PULMONARY DISEASE, UNSPECIFIED COPD TYPE: Primary | ICD-10-CM

## 2022-04-05 NOTE — TELEPHONE ENCOUNTER
Caller: BobyAimee    Relationship: Emergency Contact    Best call back number: [phone]    Requested Prescriptions:   ALBUTEROL 2 TREATMENTS A DAY USUALLY    Pharmacy where request should be sent:    [facility] - [place], [place] - [address] [facility] [address]  - [phone]  - [phone] FX  [phone]    Additional details provided by patient: PATIENT CURRENTLY HAS 5 TREATMENTS LEFT.    Does the patient have less than a 3 day supply:  [x] Yes  [] No    Marcus Mcconnell Rep   04/05/22 16:34 EDT

## 2022-04-06 RX ORDER — ALBUTEROL SULFATE 2.5 MG/3ML
2.5 SOLUTION RESPIRATORY (INHALATION) 3 TIMES DAILY PRN
Qty: 270 EACH | Refills: 3 | Status: SHIPPED | OUTPATIENT
Start: 2022-04-06 | End: 2022-06-17

## 2022-05-01 DIAGNOSIS — E20.9 HYPOPARATHYROIDISM, UNSPECIFIED HYPOPARATHYROIDISM TYPE: ICD-10-CM

## 2022-05-02 RX ORDER — CALCITRIOL 0.5 UG/1
0.5 CAPSULE, LIQUID FILLED ORAL DAILY
Qty: 90 CAPSULE | Refills: 0 | Status: SHIPPED | OUTPATIENT
Start: 2022-05-02 | End: 2022-06-17 | Stop reason: SDUPTHER

## 2022-05-04 ENCOUNTER — TELEPHONE (OUTPATIENT)
Dept: FAMILY MEDICINE CLINIC | Age: 87
End: 2022-05-04

## 2022-05-04 NOTE — TELEPHONE ENCOUNTER
Please make it so.  Okay to use coronary artery disease and peripheral vascular disease as diagnoses.  Also, use R53.1 Weakness.  Thanks.

## 2022-05-05 ENCOUNTER — HOSPITAL ENCOUNTER (OUTPATIENT)
Dept: GENERAL RADIOLOGY | Facility: HOSPITAL | Age: 87
Discharge: HOME | End: 2022-05-05

## 2022-05-05 ENCOUNTER — OFFICE VISIT (OUTPATIENT)
Dept: FAMILY MEDICINE CLINIC | Age: 87
End: 2022-05-05

## 2022-05-05 VITALS
HEART RATE: 65 BPM | SYSTOLIC BLOOD PRESSURE: 160 MMHG | DIASTOLIC BLOOD PRESSURE: 80 MMHG | OXYGEN SATURATION: 100 % | HEIGHT: 67 IN | BODY MASS INDEX: 23.2 KG/M2

## 2022-05-05 DIAGNOSIS — R07.81 RIB PAIN ON RIGHT SIDE: ICD-10-CM

## 2022-05-05 DIAGNOSIS — J44.9 CHRONIC OBSTRUCTIVE PULMONARY DISEASE, UNSPECIFIED COPD TYPE: ICD-10-CM
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
## 2022-05-05 DIAGNOSIS — M79.671 RIGHT FOOT PAIN: ICD-10-CM

## 2022-05-05 DIAGNOSIS — S22.31XA CLOSED FRACTURE OF ONE RIB OF RIGHT SIDE, INITIAL ENCOUNTER: ICD-10-CM

## 2022-05-05 DIAGNOSIS — S22.31XA CLOSED FRACTURE OF ONE RIB OF RIGHT SIDE, INITIAL ENCOUNTER: Primary | ICD-10-CM

## 2022-05-05 PROBLEM — T14.8XXA CONTUSION: Status: ACTIVE | Noted: 2022-05-05

## 2022-05-05 PROCEDURE — 99213 OFFICE O/P EST LOW 20 MIN: CPT | Performed by: NURSE PRACTITIONER

## 2022-05-05 PROCEDURE — 71101 X-RAY EXAM UNILAT RIBS/CHEST: CPT

## 2022-05-05 PROCEDURE — 73630 X-RAY EXAM OF FOOT: CPT

## 2022-05-05 RX ORDER — IBUPROFEN 600 MG/1
600 TABLET ORAL EVERY 6 HOURS PRN
Qty: 12 TABLET | Refills: 0 | Status: SHIPPED | OUTPATIENT
Start: 2022-05-05 | End: 2022-06-17

## 2022-05-05 RX ORDER — OMEPRAZOLE 20 MG/1
20 CAPSULE, DELAYED RELEASE ORAL DAILY
Qty: 7 CAPSULE | Refills: 0 | Status: SHIPPED | OUTPATIENT
Start: 2022-05-05 | End: 2022-06-17

## 2022-05-05 RX ORDER — TIMOLOL MALEATE 5 MG/ML
1 SOLUTION/ DROPS OPHTHALMIC 2 TIMES DAILY
COMMUNITY
Start: 2022-04-12

## 2022-05-05 RX ORDER — TRAMADOL HYDROCHLORIDE 50 MG/1
50 TABLET ORAL EVERY 6 HOURS PRN
Qty: 40 TABLET | Refills: 0 | Status: SHIPPED | OUTPATIENT
Start: 2022-05-05 | End: 2022-06-17

## 2022-05-05 NOTE — PROGRESS NOTES
"Chief Complaint  Foot Swelling (With bruising; bilateral foot swelling right foot is worse), Foot Pain, and Fall (Patient fell out of bed 4 days ago fall in kitchen and 3 days ago fell out of bed patient complains of right sided rib pain )    Subjective    Patient is an 87-year-old female in today accompanied by her daughter Aimee with complaints of right rib pain and right foot pain after a fall.  Patient rolled out of the bed 4 days ago, and then had another fall in the kitchen in which she injured her foot, and then rolled out of the bed again 3 days later.  Patient denies dizziness, just says she falls.  Daughter reports she is in the process of trying to get her hospital bed with rails.  Daughter states patient lives at home, does have care tenders.  Patient denies shortness of breath at this time.          Becky Zavala presents to [facility]  Pain  This is a new problem. The current episode started in the past 7 days. The problem occurs constantly. The problem has been unchanged. Pertinent negatives include no chills, congestion, coughing or fever. The symptoms are aggravated by coughing, twisting and sneezing. She has tried rest and position changes for the symptoms. The treatment provided mild relief.       Objective   Vital Signs:  /80 (BP Location: Left arm, Patient Position: Sitting, Cuff Size: Adult)   Pulse 65   Ht 168.9 cm (66.5\")   SpO2 100%   BMI 23.20 kg/m²     BMI is within normal parameters. No follow-up required.      Physical Exam  HENT:      Head: Normocephalic.   Cardiovascular:      Rate and Rhythm: Normal rate and regular rhythm.   Pulmonary:      Effort: Pulmonary effort is normal. No respiratory distress.      Breath sounds: Normal breath sounds. No stridor. No wheezing, rhonchi or rales.   Chest:      Chest wall: Tenderness present.   Musculoskeletal:      Right foot: Normal capillary refill. Swelling and tenderness present. Normal pulse.    "   Left foot: Normal capillary refill. Normal pulse.   Feet:      Right foot:      Skin integrity: Skin integrity normal. No skin breakdown, erythema or warmth.      Left foot:      Toenail Condition: Fungal disease present.  Skin:     General: Skin is warm and dry.      Findings: Bruising present.          Neurological:      Mental Status: She is alert and oriented to person, place, and time.   Psychiatric:         Mood and Affect: Mood normal.        Result Review :                 Assessment and Plan    Diagnoses and all orders for this visit:    1. Fall, initial encounter (Primary)    2. Right foot pain  -     XR Foot 3+ View Right; Future    3. Rib pain on right side  -     XR Ribs Right With PA Chest; Future  -     Hospital Bed    4. Closed fracture of one rib of right side, initial encounter  -     ibuprofen (ADVIL,MOTRIN) 600 MG tablet; Take 1 tablet by mouth Every 6 (Six) Hours As Needed for Mild Pain .  Dispense: 12 tablet; Refill: 0  -     omeprazole (priLOSEC) 20 MG capsule; Take 1 capsule by mouth Daily.  Dispense: 7 capsule; Refill: 0    5. Chronic obstructive pulmonary disease, unspecified COPD type (HCC)  -     Hospital Bed             Follow Up   Return if symptoms worsen or fail to improve.  Patient was given instructions and counseling regarding her condition or for health maintenance advice. Please see specific information pulled into the AVS if appropriate.

## 2022-05-09 RX ORDER — CYANOCOBALAMIN 1000 UG/ML
1000 INJECTION, SOLUTION INTRAMUSCULAR; SUBCUTANEOUS
Qty: 3 ML | Refills: 3 | Status: SHIPPED | OUTPATIENT
Start: 2022-05-09 | End: 2023-03-23

## 2022-05-20 ENCOUNTER — TELEPHONE (OUTPATIENT)
Dept: FAMILY MEDICINE CLINIC | Age: 87
End: 2022-05-20

## 2022-05-20 NOTE — TELEPHONE ENCOUNTER
Provider: SHANTHI BLACKWOOD  Caller: SRINIVAS YEBOAH  Relationship to Patient: DAUGHTER  Pharmacy:   Phone Number: [phone]   Reason for Call: PATIENT WAS SEEN 3 WEEKS AGO FOR SWELLING IN FOOT. XRAY DID NOT SHOW FRACTURE. SWELLING HAS NOT WENT DOWN AND HURTING.    CALLER WANTS TO KNOW IF SHE NEEDS TO SCHEDULE ANOTHER APPOINTMENT.    PLEASE CALL AND ADVISE

## 2022-05-21 DIAGNOSIS — M79.671 RIGHT FOOT PAIN: Primary | ICD-10-CM

## 2022-06-17 ENCOUNTER — LAB (OUTPATIENT)
Dept: LAB | Facility: HOSPITAL | Age: 87
End: 2022-06-17

## 2022-06-17 ENCOUNTER — OFFICE VISIT (OUTPATIENT)
Dept: FAMILY MEDICINE CLINIC | Age: 87
End: 2022-06-17

## 2022-06-17 VITALS
BODY MASS INDEX: 22.69 KG/M2 | TEMPERATURE: 97.5 F | HEIGHT: 66 IN | SYSTOLIC BLOOD PRESSURE: 161 MMHG | DIASTOLIC BLOOD PRESSURE: 82 MMHG | WEIGHT: 141.2 LBS | HEART RATE: 60 BPM

## 2022-06-17 DIAGNOSIS — E78.2 MIXED HYPERLIPIDEMIA: ICD-10-CM

## 2022-06-17 DIAGNOSIS — E03.9 ACQUIRED HYPOTHYROIDISM: ICD-10-CM

## 2022-06-17 DIAGNOSIS — E20.9 HYPOPARATHYROIDISM, UNSPECIFIED HYPOPARATHYROIDISM TYPE: ICD-10-CM

## 2022-06-17 DIAGNOSIS — F03.91 DEMENTIA WITH BEHAVIORAL DISTURBANCE, UNSPECIFIED DEMENTIA TYPE: ICD-10-CM

## 2022-06-17 DIAGNOSIS — I48.0 PAROXYSMAL A-FIB: ICD-10-CM

## 2022-06-17 DIAGNOSIS — Z00.00 PHYSICAL EXAM: Primary | ICD-10-CM

## 2022-06-17 PROBLEM — F03.918 DEMENTIA WITH BEHAVIORAL DISTURBANCE: Status: ACTIVE | Noted: 2022-06-17

## 2022-06-17 LAB
ALBUMIN SERPL-MCNC: 4 G/DL (ref 3.5–5.2)
ALBUMIN/GLOB SERPL: 2.1 G/DL
ALP SERPL-CCNC: 75 U/L (ref 39–117)
ALT SERPL W P-5'-P-CCNC: 15 U/L (ref 1–33)
ANION GAP SERPL CALCULATED.3IONS-SCNC: 12.3 MMOL/L (ref 5–15)
AST SERPL-CCNC: 22 U/L (ref 1–32)
BILIRUB SERPL-MCNC: 0.5 MG/DL (ref 0–1.2)
BUN SERPL-MCNC: 20 MG/DL (ref 8–23)
BUN/CREAT SERPL: 27 (ref 7–25)
CALCIUM SPEC-SCNC: 8.7 MG/DL (ref 8.6–10.5)
CHLORIDE SERPL-SCNC: 105 MMOL/L (ref 98–107)
CHOLEST SERPL-MCNC: 142 MG/DL (ref 0–200)
CO2 SERPL-SCNC: 26.7 MMOL/L (ref 22–29)
CREAT SERPL-MCNC: 0.74 MG/DL (ref 0.57–1)
DEPRECATED RDW RBC AUTO: 44.8 FL (ref 37–54)
EGFRCR SERPLBLD CKD-EPI 2021: 77.9 ML/MIN/1.73
ERYTHROCYTE [DISTWIDTH] IN BLOOD BY AUTOMATED COUNT: 13.7 % (ref 12.3–15.4)
GLOBULIN UR ELPH-MCNC: 1.9 GM/DL
GLUCOSE SERPL-MCNC: 115 MG/DL (ref 65–99)
HCT VFR BLD AUTO: 42.9 % (ref 34–46.6)
HDLC SERPL-MCNC: 34 MG/DL (ref 40–60)
HGB BLD-MCNC: 13.3 G/DL (ref 12–15.9)
LDLC SERPL CALC-MCNC: 83 MG/DL (ref 0–100)
LDLC/HDLC SERPL: 2.36 {RATIO}
MCH RBC QN AUTO: 27.6 PG (ref 26.6–33)
MCHC RBC AUTO-ENTMCNC: 31 G/DL (ref 31.5–35.7)
MCV RBC AUTO: 89 FL (ref 79–97)
PLATELET # BLD AUTO: 156 10*3/MM3 (ref 140–450)
PMV BLD AUTO: 10.7 FL (ref 6–12)
POTASSIUM SERPL-SCNC: 4.5 MMOL/L (ref 3.5–5.2)
PROT SERPL-MCNC: 5.9 G/DL (ref 6–8.5)
RBC # BLD AUTO: 4.82 10*6/MM3 (ref 3.77–5.28)
SODIUM SERPL-SCNC: 144 MMOL/L (ref 136–145)
TRIGL SERPL-MCNC: 138 MG/DL (ref 0–150)
TSH SERPL DL<=0.05 MIU/L-ACNC: 0.27 UIU/ML (ref 0.27–4.2)
VLDLC SERPL-MCNC: 25 MG/DL (ref 5–40)
WBC NRBC COR # BLD: 7.9 10*3/MM3 (ref 3.4–10.8)

## 2022-06-17 PROCEDURE — 85027 COMPLETE CBC AUTOMATED: CPT

## 2022-06-17 PROCEDURE — 1126F AMNT PAIN NOTED NONE PRSNT: CPT | Performed by: FAMILY MEDICINE

## 2022-06-17 PROCEDURE — 84443 ASSAY THYROID STIM HORMONE: CPT

## 2022-06-17 PROCEDURE — 80061 LIPID PANEL: CPT

## 2022-06-17 PROCEDURE — 1170F FXNL STATUS ASSESSED: CPT | Performed by: FAMILY MEDICINE

## 2022-06-17 PROCEDURE — 83970 ASSAY OF PARATHORMONE: CPT

## 2022-06-17 PROCEDURE — 36415 COLL VENOUS BLD VENIPUNCTURE: CPT

## 2022-06-17 PROCEDURE — 80053 COMPREHEN METABOLIC PANEL: CPT

## 2022-06-17 PROCEDURE — 99214 OFFICE O/P EST MOD 30 MIN: CPT | Performed by: FAMILY MEDICINE

## 2022-06-17 PROCEDURE — G0439 PPPS, SUBSEQ VISIT: HCPCS | Performed by: FAMILY MEDICINE

## 2022-06-17 PROCEDURE — 1159F MED LIST DOCD IN RCRD: CPT | Performed by: FAMILY MEDICINE

## 2022-06-17 RX ORDER — PRAVASTATIN SODIUM 80 MG/1
80 TABLET ORAL NIGHTLY
Qty: 90 TABLET | Refills: 1 | Status: SHIPPED | OUTPATIENT
Start: 2022-06-17 | End: 2022-12-15

## 2022-06-17 RX ORDER — DILTIAZEM HYDROCHLORIDE 120 MG/1
120 CAPSULE, COATED, EXTENDED RELEASE ORAL DAILY
Qty: 90 CAPSULE | Refills: 1 | Status: SHIPPED | OUTPATIENT
Start: 2022-06-17 | End: 2022-12-16 | Stop reason: SDUPTHER

## 2022-06-17 RX ORDER — SERTRALINE HYDROCHLORIDE 100 MG/1
100 TABLET, FILM COATED ORAL DAILY
Qty: 90 TABLET | Refills: 1 | Status: SHIPPED | OUTPATIENT
Start: 2022-06-17 | End: 2022-12-15

## 2022-06-17 RX ORDER — LEVOTHYROXINE SODIUM 175 MCG
175 TABLET ORAL DAILY
Qty: 90 TABLET | Refills: 1 | Status: SHIPPED | OUTPATIENT
Start: 2022-06-17 | End: 2022-12-15

## 2022-06-17 RX ORDER — GABAPENTIN 100 MG/1
100 CAPSULE ORAL
Qty: 30 CAPSULE | Refills: 0 | Status: SHIPPED | OUTPATIENT
Start: 2022-06-17 | End: 2022-09-30

## 2022-06-17 RX ORDER — DABIGATRAN ETEXILATE 150 MG/1
150 CAPSULE ORAL 2 TIMES DAILY
Qty: 180 CAPSULE | Refills: 1 | Status: SHIPPED | OUTPATIENT
Start: 2022-06-17 | End: 2022-12-16 | Stop reason: SDUPTHER

## 2022-06-17 RX ORDER — CALCITRIOL 0.5 UG/1
0.5 CAPSULE, LIQUID FILLED ORAL DAILY
Qty: 90 CAPSULE | Refills: 1 | Status: SHIPPED | OUTPATIENT
Start: 2022-06-17 | End: 2022-09-12 | Stop reason: SDUPTHER

## 2022-06-17 RX ORDER — POTASSIUM CHLORIDE 20 MEQ/1
20 TABLET, EXTENDED RELEASE ORAL 2 TIMES DAILY
Qty: 180 TABLET | Refills: 1 | Status: SHIPPED | OUTPATIENT
Start: 2022-06-17 | End: 2022-12-15

## 2022-06-17 RX ORDER — RALOXIFENE HYDROCHLORIDE 60 MG/1
60 TABLET, FILM COATED ORAL DAILY
Qty: 90 TABLET | Refills: 1 | Status: SHIPPED | OUTPATIENT
Start: 2022-06-17 | End: 2022-12-16 | Stop reason: SDUPTHER

## 2022-06-17 NOTE — PROGRESS NOTES
The ABCs of the Annual Wellness Visit  Subsequent Medicare Wellness Visit    Chief Complaint:  Wellness exam, blood pressure, cholesterol, thyroid, GERD    Subjective    History of Present Illness:  Becky Zavala is a 88 y.o. female who presents for a Subsequent Medicare Wellness Visit.    The following portions of the patient's history were reviewed and updated as appropriate: allergies, current medications, past family history, past medical history, past social history, past surgical history and problem list.     Compared to one year ago, the patient feels her physical health is the same.    Compared to one year ago, the patient feels her mental health is the same.    Recent Hospitalizations:  She was not admitted to the hospital during the last year.       Current Medical Providers:  Patient Care Team:  Surjit Gutierrez MD as PCP - General (Family Medicine)  Roque Bangura MD as Consulting Physician (Infectious Diseases)    Outpatient Medications Prior to Visit   Medication Sig Dispense Refill   • cholecalciferol (VITAMIN D3) 1000 UNITS tablet Take 2,000 Units by mouth Daily. HOLD PRIOR TO SURG     • cyanocobalamin 1000 MCG/ML injection Inject 1 mL into the appropriate muscle as directed by prescriber Every 30 (Thirty) Days. 3 mL 3   • doxycycline (VIBRAMYCIN) 100 MG capsule Take 100 mg by mouth 2 (Two) Times a Day.     • Melatonin 10 MG tablet Take 10 mg by mouth. PATIENT TAKES ONE EVERY EVENING/UNSURE IF TABLET OR CAPSULE     • Multiple Vitamins-Minerals (ICAPS AREDS 2 PO) Take 1 capsule by mouth 2 (Two) Times a Day.     • timolol (TIMOPTIC) 0.5 % ophthalmic solution Administer 1 drop to both eyes 2 (Two) Times a Day.     • albuterol (PROVENTIL) (2.5 MG/3ML) 0.083% nebulizer solution Take 2.5 mg by nebulization 3 (Three) Times a Day As Needed for Wheezing. 270 each 3   • calcitriol (ROCALTROL) 0.5 MCG capsule Take 1 capsule by mouth Daily. 90 capsule 0   • dabigatran etexilate (Pradaxa)  150 MG capsu Take 1 capsule by mouth 2 (Two) Times a Day. 180 capsule 1   • dilTIAZem CD (CARDIZEM CD) 120 MG 24 hr capsule Take 1 capsule by mouth Daily. 90 capsule 1   • ibuprofen (ADVIL,MOTRIN) 600 MG tablet Take 1 tablet by mouth Every 6 (Six) Hours As Needed for Mild Pain . 12 tablet 0   • omeprazole (priLOSEC) 20 MG capsule Take 1 capsule by mouth Daily. 7 capsule 0   • potassium chloride (K-DUR,KLOR-CON) 20 MEQ CR tablet Take 1 tablet by mouth 2 (Two) Times a Day. 180 tablet 1   • pravastatin (PRAVACHOL) 80 MG tablet Take 1 tablet by mouth Every Night. 90 tablet 1   • raloxifene (EVISTA) 60 MG tablet Take 1 tablet by mouth Daily. 90 tablet 1   • sertraline (ZOLOFT) 100 MG tablet Take 1 tablet by mouth Daily. 90 tablet 1   • Synthroid 175 MCG tablet Take 1 tablet by mouth Daily. 90 tablet 1   • traMADol (ULTRAM) 50 MG tablet Take 1 tablet by mouth Every 6 (Six) Hours As Needed for Moderate Pain . 40 tablet 0     No facility-administered medications prior to visit.       No opioid medication identified on active medication list. I have reviewed chart for other potential  high risk medication/s and harmful drug interactions in the elderly.          Aspirin is not on active medication list.  Aspirin use is not indicated based on review of current medical condition/s. Risk of harm outweighs potential benefits.  .    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  ACP discussion was held with the patient during this visit. Patient has an advance directive in EMR which is still valid. Her daughter, Aimee, would make decisions if needed.    In addition to the above, Trinity is here for follow-up on her care.  She does have some degree of dementia that is in overarching health issue.  Family does stay with her at night, and there are some caregivers during the day.  She may be having some sundowning type symptoms.  She does seem to become more anxious at night.  She is already taking an SSRI and has been on that for some time.  She also has a complicated past medical history and medical regimen.    Trinity also has hypertension.  She remains on treatments for that as noted below.  Her blood pressure is somewhat elevated today.  Her blood pressure typically runs in a fairly good range at home, close to 120/80.    In addition, she has atrial fibrillation, hyperlipidemia, hypothyroidism, and other medical problems as outlined above.  She is due for repeat laboratory evaluation.      Review of Systems:  Review of Systems   Constitutional: Negative for chills and fever.   Respiratory: Negative for cough and shortness of breath.    Cardiovascular: Negative for chest pain and palpitations.   Gastrointestinal: Negative for abdominal pain, nausea and vomiting.         Objective {Optional Links Labs  Result Review  Imaging  Med Tab  Media  Procedures :23}      Vitals:    06/17/22 1336   BP: 161/82   BP Location: Right arm   Patient Position: Sitting   Pulse: 60   Temp: 97.5 °F (36.4 °C)   TempSrc: Oral   Weight: 64 kg (141 lb 3.2 oz)   Height: 168.9 cm (66.5\")   PainSc: 0-No pain     BMI Readings from Last 1 Encounters:   06/17/22 22.45 kg/m²   BMI is within normal parameters. No follow-up required.    Does the patient have " evidence of cognitive impairment? Yes    Physical Exam  Vitals and nursing note reviewed.   Constitutional:       General: She is not in acute distress.     Appearance: She is not ill-appearing.   HENT:      Right Ear: Tympanic membrane and ear canal normal.      Left Ear: Tympanic membrane and ear canal normal.      Ears:      Comments: Her hearing is significantly diminished bilaterally.     Mouth/Throat:      Mouth: Mucous membranes are moist.      Comments: Pharynx appears normal  Eyes:      Extraocular Movements: Extraocular movements intact.      Pupils: Pupils are equal, round, and reactive to light.      Comments: Binocular vision is 20/70 with correction.   Neck:      Thyroid: No thyromegaly.   Cardiovascular:      Rate and Rhythm: Normal rate and regular rhythm.      Heart sounds: No murmur heard.  Pulmonary:      Effort: Pulmonary effort is normal.      Breath sounds: Normal breath sounds.   Abdominal:      General: There is no distension.      Palpations: Abdomen is soft. There is no mass.      Tenderness: There is no abdominal tenderness.   Musculoskeletal:      Cervical back: Normal range of motion.   Skin:     Findings: No lesion or rash.   Neurological:      General: No focal deficit present.      Mental Status: Mental status is at baseline. She is disoriented.      Cranial Nerves: No cranial nerve deficit.      Motor: No weakness.   Psychiatric:         Mood and Affect: Mood normal.       The following data was reviewed by Surjit Gutierrez MD on 06/17/2022.  Lab Results   Component Value Date    WBC 5.61 12/14/2021    HGB 13.1 12/14/2021    HCT 41.0 12/14/2021    MCV 89.3 12/14/2021     (L) 12/14/2021     Lab Results   Component Value Date    GLUCOSE 116 (H) 12/14/2021    BUN 22 12/14/2021    CREATININE 0.85 12/14/2021     12/14/2021    K 4.3 12/14/2021     12/14/2021    CO2 30.6 (H) 12/14/2021    CALCIUM 9.4 12/14/2021    PROTEINTOT 6.2 12/14/2021    ALBUMIN 3.90 12/14/2021     ALT 18 12/14/2021    AST 16 12/14/2021    ALKPHOS 63 12/14/2021    BILITOT 0.6 12/14/2021    EGFRIFNONA 63 12/14/2021    GLOB 2.3 12/14/2021    AGRATIO 1.7 12/14/2021    BCR 25.9 (H) 12/14/2021    ANIONGAP 6.4 12/14/2021      Lab Results   Component Value Date    CHOL 98 10/09/2016    CHLPL 159 05/24/2021    TRIG 172 (H) 05/24/2021    HDL 34 (L) 05/24/2021    LDL 91 05/24/2021     Lab Results   Component Value Date    TSH 0.748 12/14/2021     Lab Results   Component Value Date    HGBA1C 5.40 12/14/2021          HEALTH RISK ASSESSMENT    Smoking Status:  Social History     Tobacco Use   Smoking Status Never Smoker   Smokeless Tobacco Never Used     Alcohol Consumption:  Social History     Substance and Sexual Activity   Alcohol Use No     Fall Risk Screen:    JAI Fall Risk Assessment was completed, and patient is at HIGH risk for falls. Assessment completed on:6/17/2022    Depression Screening:  PHQ-2/PHQ-9 Depression Screening 6/17/2022   Retired PHQ-9 Total Score -   Retired Total Score -   Little Interest or Pleasure in Doing Things 0-->not at all   Feeling Down, Depressed or Hopeless 0-->not at all   PHQ-9: Brief Depression Severity Measure Score 0       Health Habits and Functional and Cognitive Screening:  Functional & Cognitive Status 6/17/2022   Do you have difficulty preparing food and eating? No   Do you have difficulty bathing yourself, getting dressed or grooming yourself? No   Do you have difficulty using the toilet? No   Do you have difficulty moving around from place to place? No   Do you have trouble with steps or getting out of a bed or a chair? No   Current Diet Well Balanced Diet   Dental Exam Not up to date   Eye Exam Up to date   Exercise (times per week) 0 times per week   Current Exercises Include No Regular Exercise   Do you need help using the phone?  No   Are you deaf or do you have serious difficulty hearing?  Yes   Do you need help with transportation? No   Do you need help shopping?  Yes   Do you need help preparing meals?  No   Do you need help with housework?  Yes   Do you need help with laundry? Yes   Do you need help taking your medications? Yes   Do you need help managing money? Yes   Do you ever drive or ride in a car without wearing a seat belt? No   Have you felt unusual stress, anger or loneliness in the last month? No   Who do you live with? Alone   If you need help, do you have trouble finding someone available to you? No   Have you been bothered in the last four weeks by sexual problems? No   Do you have difficulty concentrating, remembering or making decisions? Yes       Age-appropriate Screening Schedule:  Refer to the list below for future screening recommendations based on patient's age, sex and/or medical conditions. Orders for these recommended tests are listed in the plan section. The patient has been provided with a written plan.    Health Maintenance   Topic Date Due   • DXA SCAN  07/13/2014   • LIPID PANEL  05/24/2022   • INFLUENZA VACCINE  10/01/2022   • TDAP/TD VACCINES (2 - Td or Tdap) 07/08/2028   • ZOSTER VACCINE  Completed                     Assessment and Plan:       CMS Preventative Services Quick Reference  Risk Factors Identified During Encounter  Cardiovascular Disease  Dementia/Memory   Fall Risk-High or Moderate  Hearing Problem  Immunizations Discussed/Encouraged (specific Immunizations; Influenza and COVID19  Polypharmacy    The above risks/problems have been discussed with the patient.  Follow up actions/plans if indicated are seen below in the Assessment/Plan Section.  Pertinent information has been shared with the patient in the After Visit Summary.    Today, we have reviewed her care as above.  Rigoberto is doing fairly well for the most part.  She is essentially up-to-date on needed screenings and vaccinations.  She is due for COVID-19 booster, and they will consider getting that at the pharmacy.  Otherwise, we will move ahead with a trial of gabapentin in  the evening to see if it helps with some of the sundowning symptoms that she is having.  I would prefer to avoid antipsychotics, but we might consider that treatment if she does not improve.  We will obtain consent for gabapentin.  I anticipate she will tolerate it okay.  Otherwise, we have reviewed her usual medications and problems.  We will refill medicines as noted below and update some blood work.  We will schedule follow-up in about 6 months.  No other short-term change is anticipated.    Diagnoses and all orders for this visit:    1. Physical exam (Primary)    2. Dementia with behavioral disturbance, unspecified dementia type ([facility])  -     gabapentin (NEURONTIN) 100 MG capsule; Take 1 capsule by mouth Daily Before Supper.  Dispense: 30 capsule; Refill: 0    3. Hypoparathyroidism, unspecified hypoparathyroidism type ([facility])  -     calcitriol (ROCALTROL) 0.5 MCG capsule; Take 1 capsule by mouth Daily.  Dispense: 90 capsule; Refill: 1  -     PTH, Intact; Future    4. Paroxysmal A-fib ([facility])  -     dabigatran etexilate (Pradaxa) 150 MG capsu; Take 1 capsule by mouth 2 (Two) Times a Day.  Dispense: 180 capsule; Refill: 1  -     dilTIAZem CD (CARDIZEM CD) 120 MG 24 hr capsule; Take 1 capsule by mouth Daily.  Dispense: 90 capsule; Refill: 1  -     potassium chloride (K-DUR,KLOR-CON) 20 MEQ CR tablet; Take 1 tablet by mouth 2 (Two) Times a Day.  Dispense: 180 tablet; Refill: 1  -     CBC (No Diff); Future  -     Comprehensive Metabolic Panel; Future    5. Acquired hypothyroidism  -     Synthroid 175 MCG tablet; Take 1 tablet by mouth Daily.  Dispense: 90 tablet; Refill: 1  -     TSH; Future    6. Mixed hyperlipidemia  -     pravastatin (PRAVACHOL) 80 MG tablet; Take 1 tablet by mouth Every Night.  Dispense: 90 tablet; Refill: 1  -     Comprehensive Metabolic Panel; Future  -     Lipid Panel; Future    Other orders  -     sertraline (ZOLOFT) 100 MG tablet; Take 1 tablet by mouth Daily.  Dispense: 90 tablet; Refill: 1  -      raloxifene (EVISTA) 60 MG tablet; Take 1 tablet by mouth Daily.  Dispense: 90 tablet; Refill: 1        Follow Up:   Return in about 6 months (around 12/17/2022) for Recheck.     An After Visit Summary and PPPS were given to the patient.

## 2022-06-18 LAB — PTH-INTACT SERPL-MCNC: 3.1 PG/ML (ref 15–65)

## 2022-07-02 DIAGNOSIS — E78.2 MIXED HYPERLIPIDEMIA: ICD-10-CM

## 2022-07-02 DIAGNOSIS — E03.9 ACQUIRED HYPOTHYROIDISM: ICD-10-CM

## 2022-07-05 RX ORDER — LEVOTHYROXINE SODIUM 175 MCG
TABLET ORAL
Qty: 90 TABLET | Refills: 1 | OUTPATIENT
Start: 2022-07-05

## 2022-07-05 RX ORDER — PRAVASTATIN SODIUM 80 MG/1
TABLET ORAL
Qty: 90 TABLET | Refills: 1 | OUTPATIENT
Start: 2022-07-05

## 2022-07-05 RX ORDER — SERTRALINE HYDROCHLORIDE 100 MG/1
TABLET, FILM COATED ORAL
Qty: 90 TABLET | Refills: 1 | OUTPATIENT
Start: 2022-07-05

## 2022-08-19 ENCOUNTER — TELEPHONE (OUTPATIENT)
Dept: FAMILY MEDICINE CLINIC | Age: 87
End: 2022-08-19

## 2022-08-19 DIAGNOSIS — F03.91 DEMENTIA WITH BEHAVIORAL DISTURBANCE, UNSPECIFIED DEMENTIA TYPE: Primary | ICD-10-CM

## 2022-08-19 RX ORDER — QUETIAPINE FUMARATE 25 MG/1
25 TABLET, FILM COATED ORAL NIGHTLY
Qty: 30 TABLET | Refills: 1 | Status: SHIPPED | OUTPATIENT
Start: 2022-08-19 | End: 2022-09-30

## 2022-08-19 NOTE — TELEPHONE ENCOUNTER
Caller: Aimee Landis    Relationship: Emergency Contact    Best call back number: [phone]    What medications are you currently taking:   Current Outpatient Medications on File Prior to Visit   Medication Sig Dispense Refill   • calcitriol (ROCALTROL) 0.5 MCG capsule Take 1 capsule by mouth Daily. 90 capsule 1   • cholecalciferol (VITAMIN D3) 1000 UNITS tablet Take 2,000 Units by mouth Daily. HOLD PRIOR TO SURG     • cyanocobalamin 1000 MCG/ML injection Inject 1 mL into the appropriate muscle as directed by prescriber Every 30 (Thirty) Days. 3 mL 3   • dabigatran etexilate (Pradaxa) 150 MG capsu Take 1 capsule by mouth 2 (Two) Times a Day. 180 capsule 1   • dilTIAZem CD (CARDIZEM CD) 120 MG 24 hr capsule Take 1 capsule by mouth Daily. 90 capsule 1   • doxycycline (VIBRAMYCIN) 100 MG capsule Take 100 mg by mouth 2 (Two) Times a Day.     • gabapentin (NEURONTIN) 100 MG capsule Take 1 capsule by mouth Daily Before Supper. 30 capsule 0   • Melatonin 10 MG tablet Take 10 mg by mouth. PATIENT TAKES ONE EVERY EVENING/UNSURE IF TABLET OR CAPSULE     • Multiple Vitamins-Minerals (ICAPS AREDS 2 PO) Take 1 capsule by mouth 2 (Two) Times a Day.     • potassium chloride (K-DUR,KLOR-CON) 20 MEQ CR tablet Take 1 tablet by mouth 2 (Two) Times a Day. 180 tablet 1   • pravastatin (PRAVACHOL) 80 MG tablet Take 1 tablet by mouth Every Night. 90 tablet 1   • raloxifene (EVISTA) 60 MG tablet Take 1 tablet by mouth Daily. 90 tablet 1   • sertraline (ZOLOFT) 100 MG tablet Take 1 tablet by mouth Daily. 90 tablet 1   • Synthroid 175 MCG tablet Take 1 tablet by mouth Daily. 90 tablet 1   • timolol (TIMOPTIC) 0.5 % ophthalmic solution Administer 1 drop to both eyes 2 (Two) Times a Day.       No current facility-administered medications on file prior to visit.          When did you start taking these medications: END OF June/END OF JULY    Which medication are you concerned about:   gabapentin (NEURONTIN) 100 MG capsule    Who prescribed  you this medication:   [facility]     What are your concerns: MEDICATION IS NOT HELPING WITH ANXIETY DUE TO SUNDOWNERS. CAREGIVER SUGGESTED THAT PATIENT SHOULD START SEROQUEL IF POSSIBLE. DAUGHTER OF PATIENT, SRINIVAS, IS REQUESTING CALL BACK TO LET HER KNOW WHAT THE OPTIONS ARE.     How long have you had these concerns: June OR JULY

## 2022-08-19 NOTE — TELEPHONE ENCOUNTER
Please reach out to Aimee and confirm what is going on.  I suspect she is having some change in her dementia.  I have sent a prescription for Seroquel to [facility].  This would be given nightly in addition to her other medications.  The hope is that it would help calm her at night and let her rest better.  There is some risk with use of this medication.  This includes some increased risk of stroke and or death.  However, there is not necessarily another alternative that I think would work better.  We frequently use this medication in this type of situation to help with behaviors.  Let me know if she has other concerns.  Thanks.

## 2022-09-11 DIAGNOSIS — E20.9 HYPOPARATHYROIDISM, UNSPECIFIED HYPOPARATHYROIDISM TYPE: ICD-10-CM

## 2022-09-12 ENCOUNTER — PATIENT MESSAGE (OUTPATIENT)
Dept: FAMILY MEDICINE CLINIC | Age: 87
End: 2022-09-12

## 2022-09-12 DIAGNOSIS — E20.9 HYPOPARATHYROIDISM, UNSPECIFIED HYPOPARATHYROIDISM TYPE: ICD-10-CM

## 2022-09-12 RX ORDER — CALCITRIOL 0.5 UG/1
CAPSULE, LIQUID FILLED ORAL
Qty: 180 CAPSULE | Refills: 1 | OUTPATIENT
Start: 2022-09-12

## 2022-09-12 RX ORDER — CALCITRIOL 0.5 UG/1
0.5 CAPSULE, LIQUID FILLED ORAL 2 TIMES DAILY
Qty: 180 CAPSULE | Refills: 3 | Status: SHIPPED | OUTPATIENT
Start: 2022-09-12

## 2022-09-12 NOTE — TELEPHONE ENCOUNTER
From: Becky BLEDSOE Howiejanes  To: Surjit Gutierrez MD  Sent: 2022 10:54 AM EDT  Subject: Calcitriol    I need to clarify the dosage on Becky Zavala ( 34) Calcitriol, please? I have been giving it to her twice a day, but her prescription ran out and I noticed the bottle says once per day. I know that the dosage has been changed several times in the past based on her blood test results. We are going to need a refill because we are out of the medication, and it is not scheduled to be refilled until October. Thank you for your help!!  Aimee Landis

## 2022-09-30 ENCOUNTER — HOSPITAL ENCOUNTER (OUTPATIENT)
Dept: GENERAL RADIOLOGY | Facility: HOSPITAL | Age: 87
Discharge: HOME | End: 2022-09-30

## 2022-09-30 ENCOUNTER — OFFICE VISIT (OUTPATIENT)
Dept: FAMILY MEDICINE CLINIC | Age: 87
End: 2022-09-30

## 2022-09-30 VITALS
WEIGHT: 149.8 LBS | HEIGHT: 66 IN | DIASTOLIC BLOOD PRESSURE: 83 MMHG | OXYGEN SATURATION: 99 % | SYSTOLIC BLOOD PRESSURE: 179 MMHG | BODY MASS INDEX: 24.08 KG/M2 | TEMPERATURE: 98.2 F | HEART RATE: 61 BPM

## 2022-09-30 DIAGNOSIS — L98.9 SKIN LESION OF CHEEK: ICD-10-CM

## 2022-09-30 DIAGNOSIS — S92.355A CLOSED NONDISPLACED FRACTURE OF FIFTH METATARSAL BONE OF LEFT FOOT, INITIAL ENCOUNTER: Primary | ICD-10-CM

## 2022-09-30 DIAGNOSIS — M25.572 ACUTE LEFT ANKLE PAIN: ICD-10-CM

## 2022-09-30 DIAGNOSIS — M79.672 LEFT FOOT PAIN: ICD-10-CM

## 2022-09-30 PROCEDURE — 73610 X-RAY EXAM OF ANKLE: CPT

## 2022-09-30 PROCEDURE — 73630 X-RAY EXAM OF FOOT: CPT

## 2022-09-30 PROCEDURE — 99213 OFFICE O/P EST LOW 20 MIN: CPT | Performed by: PHYSICIAN ASSISTANT

## 2022-09-30 NOTE — PROGRESS NOTES
"Subjective     CHIEF COMPLAINT    Chief Complaint   Patient presents with   • Foot Swelling     Ongoing since Sunday. (L) foot. Pt daughter states she twisted her leg.             History of Present Illness  This is an 88-year-old female presenting to the clinic accompanied by her daughter complaining of the left foot and ankle pain, swelling, bruising for the last 4 to 5 days.  She states she rolled her foot/ankle on 9/25/2022 and felt a \"pop\" at that time.  She has had pretty significant swelling and bruising over the area since.  Her daughter reports she had a previous ankle sprain that was treated with physical therapy after seeing podiatry and they felt like this was very helpful for her.  If this is indeed another ankle sprain they are requesting a physical therapy referral.    She also complains of a \"sore\" on her left cheek that will not heal.  Her daughter is not sure if she has been scratching at it and night sweats not healing.  She has otherwise been feeling well with no fevers, chills, or other concerns today.            Review of Systems   Constitutional: Negative for chills and fever.   Musculoskeletal: Positive for arthralgias, gait problem and joint swelling. Negative for myalgias.        Left ankle   Skin: Negative for wound.   Neurological: Negative for weakness and numbness.            Past Medical History:   Diagnosis Date   • AAA (abdominal aortic aneurysm) ([facility])    • Alzheimer's disease, unspecified (HCC)    • Anxiety    • Atrial fibrillation (HCC)    • Cataract    • Cellulitis    • Chronic atrial fibrillation (HCC)    • Chronic kidney disease    • COPD (chronic obstructive pulmonary disease) (HCC)    • Coronary artery disease    • Dementia (HCC)     SLIGHT PER DAUGHTER   • Disease of thyroid gland    • Diverticulitis of colon 2008   • Essential (primary) hypertension    • Frequency of micturition    • Glaucoma    • Groin incision ulcer (HCC)     SMALL OPEN AREA TO LEFT GROIN   • Hemorrhage of " anus and rectum    • History of heart attack    • HL (hearing loss)    • Hyperlipidemia    • Hypocalcemia    • Hypothyroidism    • Macular degeneration    • MSSA (methicillin susceptible Staphylococcus aureus) infection     CAUSE OF ANEURSYM PER DAUGHTER STATED   • Myocardial infarction (HCC)    • Neck pain     LOWER    • Neoplasm of uncertain behavior of skin    • Neuropathy    • Osteopenia    • Osteoporosis    • Other hypoparathyroidism (HCC)    • Other vitamin B12 deficiency anemias    • PONV (postoperative nausea and vomiting)    • Popliteal aneurysm (HCC)     LEFT   • Pressure sore     LEFT HEEL DRESSING INTACT   • PVD (peripheral vascular disease) (HCC)    • UTI (urinary tract infection)    • Visual impairment             Past Surgical History:   Procedure Laterality Date   • APPENDECTOMY     • CHOLECYSTECTOMY     • COLONOSCOPY     • COLONOSCOPY N/A 6/27/2019    NBIH, diverticulosis, multiple polyps benign per path    • ENDOSCOPY N/A 6/26/2019    normal    • EYE SURGERY     • FRACTURE SURGERY     • HYSTERECTOMY     • ORIF FEMUR FRACTURE  02/18/2016    left tib fib   • WY REANEURYSM/GRFT INS,COMMON FEMORAL Left 12/9/2016    Procedure: REPAIR OF LEFT MYCOTIC POPLITEAL ANEURYSM WITH ARTEGRAFT GRAFT, INTERPOSITION;  Surgeon: Randy Ott MD;  Location: [facility] OR;  Service: Vascular   • WY VEIN IN SITU BYPASS GRAFT,FEM-POP Left 10/7/2016    Procedure: RESECTION OF INFECTED LEFT FEMORAL ANEURYSM, EXTERNAL ILIAC TO SFA BYPASS WITH REIMPLANTATION PROFUNDA, AIF ARTERIOGRAM WITH LEFT LEG RUNOFF, SARTORIUS MUSCLE FLAP;  Surgeon: Randy Ott MD;  Location: [facility] OR;  Service: Vascular   • THYROID SURGERY     • UPPER GASTROINTESTINAL ENDOSCOPY  6/27/2019            Family History   Problem Relation Age of Onset   • Breast cancer Daughter    • Other Daughter         anaphalaxysis   • Cancer Daughter    • Cancer Daughter             Social History     Socioeconomic History   • Marital status:    •  Number of children: 13   Tobacco Use   • Smoking status: Never Smoker   • Smokeless tobacco: Never Used   Substance and Sexual Activity   • Alcohol use: No   • Drug use: No   • Sexual activity: Never            Allergies   Allergen Reactions   • Ciprofloxacin Other (See Comments)     neck pain; lips and eyes swelled   • Other      ALL DRUGS IN THE FLUOROQUINOLONES            Current Outpatient Medications on File Prior to Visit   Medication Sig Dispense Refill   • calcitriol (ROCALTROL) 0.5 MCG capsule Take 1 capsule by mouth 2 (Two) Times a Day. 180 capsule 3   • cholecalciferol (VITAMIN D3) 1000 UNITS tablet Take 2,000 Units by mouth Daily. HOLD PRIOR TO SURG     • cyanocobalamin 1000 MCG/ML injection Inject 1 mL into the appropriate muscle as directed by prescriber Every 30 (Thirty) Days. 3 mL 3   • dabigatran etexilate (Pradaxa) 150 MG capsu Take 1 capsule by mouth 2 (Two) Times a Day. 180 capsule 1   • dilTIAZem CD (CARDIZEM CD) 120 MG 24 hr capsule Take 1 capsule by mouth Daily. 90 capsule 1   • doxycycline (VIBRAMYCIN) 100 MG capsule Take 100 mg by mouth 2 (Two) Times a Day.     • Melatonin 10 MG tablet Take 10 mg by mouth. PATIENT TAKES ONE EVERY EVENING/UNSURE IF TABLET OR CAPSULE     • Multiple Vitamins-Minerals (ICAPS AREDS 2 PO) Take 1 capsule by mouth 2 (Two) Times a Day.     • potassium chloride (K-DUR,KLOR-CON) 20 MEQ CR tablet Take 1 tablet by mouth 2 (Two) Times a Day. 180 tablet 1   • pravastatin (PRAVACHOL) 80 MG tablet Take 1 tablet by mouth Every Night. 90 tablet 1   • raloxifene (EVISTA) 60 MG tablet Take 1 tablet by mouth Daily. 90 tablet 1   • sertraline (ZOLOFT) 100 MG tablet Take 1 tablet by mouth Daily. 90 tablet 1   • Synthroid 175 MCG tablet Take 1 tablet by mouth Daily. 90 tablet 1   • timolol (TIMOPTIC) 0.5 % ophthalmic solution Administer 1 drop to both eyes 2 (Two) Times a Day.     • [DISCONTINUED] QUEtiapine (SEROquel) 25 MG tablet Take 1 tablet by mouth Every Night. 30 tablet 1   •  "[DISCONTINUED] gabapentin (NEURONTIN) 100 MG capsule Take 1 capsule by mouth Daily Before Supper. 30 capsule 0     No current facility-administered medications on file prior to visit.            /83 (BP Location: Right arm, Patient Position: Sitting, Cuff Size: Adult)   Pulse 61   Temp 98.2 °F (36.8 °C) (Oral)   Ht 168.9 cm (66.5\")   Wt 67.9 kg (149 lb 12.8 oz)   SpO2 99% Comment: room air  BMI 23.82 kg/m²          Objective     Physical Exam  Vitals and nursing note reviewed.   Constitutional:       General: She is not in acute distress.     Appearance: Normal appearance.   Pulmonary:      Effort: Pulmonary effort is normal. No respiratory distress.   Skin:     General: Skin is warm and dry.      Findings: Lesion (2 to 3 mm ulcerated lesion/abrasion to left cheek, near nose.) present. No bruising or erythema.   Neurological:      Mental Status: She is alert and oriented to person, place, and time.   Psychiatric:         Mood and Affect: Mood normal.         Behavior: Behavior normal.              Procedures                    Lab Results (last 24 hours)     ** No results found for the last 24 hours. **                XR Foot 3+ View Left    Result Date: 9/30/2022  PROCEDURE: XR FOOT 3+ VW LEFT  COMPARISON: None  INDICATIONS: left foot pain/swelling; \"pop\" on sunday  FINDINGS:  Osteopenia.  There is a mildly displaced transverse fracture intra-articular of the base of the 5th metatarsal.  It is displaced proximally by 2 mm.  Lateral soft tissue swelling.  Mild joint space narrowing 1st metatarsophalangeal joint.  Plate and screw device along the distal fibula.  Vascular calcifications.  Soft tissue swelling.       Minimally displaced intra-articular fracture base of 5th metatarsal.      AMIE GOODSON MD       Electronically Signed and Approved By: AMIE GOODSON MD on 9/30/2022 at 14:01                               Diagnoses and all orders for this visit:    1. Closed nondisplaced fracture of fifth " metatarsal bone of left foot, initial encounter (Primary)  Comments:  Walking boot provided for patient today.  Follow-up with specialist as below.  Orders:  -     Ambulatory Referral to Orthopedic Surgery    2. Left foot pain  -     XR Foot 3+ View Left; Future    3. Acute left ankle pain  Comments:  Declines ankle x-ray at this time.  Prefers to see orthopedics first and let them order if they feel it is necessary.  Orders:  -     Cancel: XR Ankle 3+ View Left; Future    4. Skin lesion of cheek  -     Ambulatory Referral to Dermatology  -     mupirocin (BACTROBAN) 2 % ointment; Apply 1 application topically to the appropriate area as directed 2 (Two) Times a Day.  Dispense: 15 g; Refill: 0                           FOR FULL DISCHARGE INSTRUCTIONS/COMMENTS/HANDOUTS please see the AVS

## 2022-10-05 ENCOUNTER — OFFICE VISIT (OUTPATIENT)
Dept: ORTHOPEDIC SURGERY | Facility: CLINIC | Age: 87
End: 2022-10-05

## 2022-10-05 VITALS — WEIGHT: 150 LBS | HEIGHT: 67 IN | HEART RATE: 68 BPM | BODY MASS INDEX: 23.54 KG/M2 | OXYGEN SATURATION: 98 %

## 2022-10-05 DIAGNOSIS — S92.355A CLOSED NONDISPLACED FRACTURE OF FIFTH METATARSAL BONE OF LEFT FOOT, INITIAL ENCOUNTER: Primary | ICD-10-CM

## 2022-10-05 PROCEDURE — 99203 OFFICE O/P NEW LOW 30 MIN: CPT | Performed by: ORTHOPAEDIC SURGERY

## 2022-10-05 NOTE — PROGRESS NOTES
"Chief Complaint  Initial Evaluation of the Left Foot     Subjective      Becky Zavala presents to [facility] for an evaluation of left foot. She went to get up, out of a chair when she felt a pop in the left foot. She had twisted the foot when standing up. This resulted in immediate pain. She is present today in a walking boot and wheelchair. Injury sustained on 9/25/2022.    Allergies   Allergen Reactions   • Ciprofloxacin Other (See Comments)     neck pain; lips and eyes swelled   • Other      ALL DRUGS IN THE FLUOROQUINOLONES        Social History     Socioeconomic History   • Marital status:    • Number of children: 13   Tobacco Use   • Smoking status: Never Smoker   • Smokeless tobacco: Never Used   Substance and Sexual Activity   • Alcohol use: No   • Drug use: No   • Sexual activity: Never        Review of Systems     Objective   Vital Signs:   Pulse 68   Ht 170.2 cm (67\")   Wt 68 kg (150 lb)   SpO2 98%   BMI 23.49 kg/m²       Physical Exam  Constitutional:       Appearance: Normal appearance. Patient is well-developed and normal weight.   HENT:      Head: Normocephalic.      Right Ear: Hearing and external ear normal.      Left Ear: Hearing and external ear normal.      Nose: Nose normal.   Eyes:      Conjunctiva/sclera: Conjunctivae normal.   Cardiovascular:      Rate and Rhythm: Normal rate.   Pulmonary:      Effort: Pulmonary effort is normal.      Breath sounds: No wheezing or rales.   Abdominal:      Palpations: Abdomen is soft.      Tenderness: There is no abdominal tenderness.   Musculoskeletal:      Cervical back: Normal range of motion.   Skin:     Findings: No rash.   Neurological:      Mental Status: Patient  is alert and oriented to person, place, and time.   Psychiatric:         Mood and Affect: Mood and affect normal.         Judgment: Judgment normal.       Ortho Exam      LEFT FOOT: Ambulation with a wheelchair. Swelling. Bruising. Achilles intact. " "Tenderness to palpation to lateral foot. Sensation grossly intact. Neurovascular intact.  Dorsal Pedal Pulse 2+, posterior tibialis pulse 2+. Calf soft. Non-tender medial foot.       Procedures        Imaging Results (Most Recent)     None           Result Review :       XR Foot 3+ View Left    Result Date: 9/30/2022  Narrative: PROCEDURE: XR FOOT 3+ VW LEFT  COMPARISON: None  INDICATIONS: left foot pain/swelling; \"pop\" on sunday  FINDINGS:  Osteopenia.  There is a mildly displaced transverse fracture intra-articular of the base of the 5th metatarsal.  It is displaced proximally by 2 mm.  Lateral soft tissue swelling.  Mild joint space narrowing 1st metatarsophalangeal joint.  Plate and screw device along the distal fibula.  Vascular calcifications.  Soft tissue swelling.      Impression:  Minimally displaced intra-articular fracture base of 5th metatarsal.      AMIE GOODSON MD       Electronically Signed and Approved By: AMIE GOODSON MD on 9/30/2022 at 14:01                      Assessment and Plan     Diagnoses and all orders for this visit:    1. Closed nondisplaced fracture of fifth metatarsal bone of left foot, initial encounter (Primary)        Continue use of the walking boot.   Ice and elevate the foot to reduce swelling.   Repeat films next visit.   She may take the boot off when sitting and sleeping.   She is allowed to weight bear with the boot.     Call or return if worsening symptoms.    Follow Up     3-4 weeks       Patient was given instructions and counseling regarding her condition or for health maintenance advice. Please see specific information pulled into the AVS if appropriate.     Scribed for Ross Raya MD by Gracia Loja.  10/05/22   15:01 EDT      I have personally performed the services described in this document as scribed by the above individual and it is both accurate and complete. Ross Raya MD 10/05/22   "

## 2022-10-06 ENCOUNTER — TELEPHONE (OUTPATIENT)
Dept: FAMILY MEDICINE CLINIC | Age: 87
End: 2022-10-06

## 2022-10-06 ENCOUNTER — LAB (OUTPATIENT)
Dept: LAB | Facility: HOSPITAL | Age: 87
End: 2022-10-06

## 2022-10-06 DIAGNOSIS — E03.9 ACQUIRED HYPOTHYROIDISM: ICD-10-CM

## 2022-10-06 DIAGNOSIS — N39.9 URINARY DISORDER: ICD-10-CM

## 2022-10-06 DIAGNOSIS — I10 ESSENTIAL HYPERTENSION: Primary | ICD-10-CM

## 2022-10-06 DIAGNOSIS — I10 ESSENTIAL HYPERTENSION: ICD-10-CM

## 2022-10-06 LAB
ANION GAP SERPL CALCULATED.3IONS-SCNC: 8.6 MMOL/L (ref 5–15)
BUN SERPL-MCNC: 24 MG/DL (ref 8–23)
BUN/CREAT SERPL: 26.1 (ref 7–25)
CALCIUM SPEC-SCNC: 9.9 MG/DL (ref 8.6–10.5)
CHLORIDE SERPL-SCNC: 105 MMOL/L (ref 98–107)
CO2 SERPL-SCNC: 29.4 MMOL/L (ref 22–29)
CREAT SERPL-MCNC: 0.92 MG/DL (ref 0.57–1)
EGFRCR SERPLBLD CKD-EPI 2021: 60 ML/MIN/1.73
GLUCOSE SERPL-MCNC: 158 MG/DL (ref 65–99)
POTASSIUM SERPL-SCNC: 4.2 MMOL/L (ref 3.5–5.2)
SODIUM SERPL-SCNC: 143 MMOL/L (ref 136–145)
TSH SERPL DL<=0.05 MIU/L-ACNC: 0.23 UIU/ML (ref 0.27–4.2)

## 2022-10-06 PROCEDURE — 84443 ASSAY THYROID STIM HORMONE: CPT

## 2022-10-06 PROCEDURE — 36415 COLL VENOUS BLD VENIPUNCTURE: CPT

## 2022-10-06 PROCEDURE — 80048 BASIC METABOLIC PNL TOTAL CA: CPT

## 2022-10-06 NOTE — TELEPHONE ENCOUNTER
Noted.  I am unsure what to make of the concern.  I would recommend urine testing and blood work as a precaution.  Orders have been placed.  Thanks.

## 2022-10-06 NOTE — TELEPHONE ENCOUNTER
pts daughter states caregiver has mentioned that pts urine output has decreased. States she is drinking plenty throughout the day and does not have any complaints of anything bothering her. Would like to know what you would suggest. Please advise.

## 2022-10-07 ENCOUNTER — LAB (OUTPATIENT)
Dept: LAB | Facility: HOSPITAL | Age: 87
End: 2022-10-07

## 2022-10-07 DIAGNOSIS — N39.9 URINARY DISORDER: ICD-10-CM

## 2022-10-07 LAB
AMORPH URATE CRY URNS QL MICRO: ABNORMAL /HPF
BACTERIA UR QL AUTO: ABNORMAL /HPF
BILIRUB UR QL STRIP: NEGATIVE
CLARITY UR: ABNORMAL
COLOR UR: YELLOW
GLUCOSE UR STRIP-MCNC: NEGATIVE MG/DL
GRAN CASTS URNS QL MICRO: ABNORMAL /LPF
HGB UR QL STRIP.AUTO: NEGATIVE
KETONES UR QL STRIP: NEGATIVE
LEUKOCYTE ESTERASE UR QL STRIP.AUTO: NEGATIVE
NITRITE UR QL STRIP: NEGATIVE
PH UR STRIP.AUTO: 6.5 [PH] (ref 5–8)
PROT UR QL STRIP: NEGATIVE
RBC # UR STRIP: ABNORMAL /HPF
REF LAB TEST METHOD: ABNORMAL
SP GR UR STRIP: 1.02 (ref 1–1.03)
SQUAMOUS #/AREA URNS HPF: ABNORMAL /HPF
UROBILINOGEN UR QL STRIP: ABNORMAL
WBC # UR STRIP: ABNORMAL /HPF

## 2022-10-07 PROCEDURE — 81001 URINALYSIS AUTO W/SCOPE: CPT

## 2022-11-04 ENCOUNTER — OFFICE VISIT (OUTPATIENT)
Dept: ORTHOPEDIC SURGERY | Facility: CLINIC | Age: 87
End: 2022-11-04

## 2022-11-04 VITALS — HEIGHT: 67 IN | WEIGHT: 150 LBS | BODY MASS INDEX: 23.54 KG/M2 | HEART RATE: 55 BPM | OXYGEN SATURATION: 100 %

## 2022-11-04 DIAGNOSIS — S92.355D CLOSED NONDISPLACED FRACTURE OF FIFTH METATARSAL BONE OF LEFT FOOT WITH ROUTINE HEALING, SUBSEQUENT ENCOUNTER: Primary | ICD-10-CM

## 2022-11-04 PROCEDURE — 99212 OFFICE O/P EST SF 10 MIN: CPT | Performed by: PHYSICIAN ASSISTANT

## 2022-11-04 NOTE — PROGRESS NOTES
"Chief Complaint  Pain and Follow-up of the Left Foot    Subjective      Becky Zavala presents to [facility] for follow-up of nondisplaced fracture of the base of the left fifth metatarsal sustained on 9/25/2022.  Patient was evaluated in office on 10/5/2022 and recommended to continue walking boot with icing and elevation as needed to reduce swelling.  She presents today for follow-up with walking boot in place.  Patient has tolerated boot well and denies any pain.    Objective   Allergies   Allergen Reactions   • Ciprofloxacin Other (See Comments)     neck pain; lips and eyes swelled   • Other      ALL DRUGS IN THE FLUOROQUINOLONES       Vital Signs:   Pulse 55   Ht 170.2 cm (67\")   Wt 68 kg (150 lb)   SpO2 100%   BMI 23.49 kg/m²       Physical Exam    Constitutional: Awake, alert. Well nourished appearance.    Integumentary: Warm, dry, intact. No obvious rashes.    HENT: Atraumatic, normocephalic.   Respiratory: Non labored respirations .   Cardiovascular: Intact peripheral pulses.    Psychiatric: Normal mood and affect. A&O X3    Ortho Exam  Left foot: Moderate edema and ecchymosis noted to the lateral aspect.  Tenderness to palpation of left fifth metatarsal.  Full plantarflexion and dorsiflexion of the ankle.  Limited eversion of the ankle.  Full inversion.  Patient is able to wiggle toes.  Sensation intact to light touch.  Distal neurovascular intact.    Imaging Results (Most Recent)     Procedure Component Value Units Date/Time    XR Foot 2 View Left [[id number]] Resulted: 11/04/22 1628     Updated: 11/04/22 1628    Narrative:      X-Ray Report:  Study: X-rays ordered, taken in the office, and reviewed today.   Site: Left foot Xray  Indication: Fracture  View: AP/Lateral view(s)  Findings: Early healing noted to fracture at the base of the left fifth   metatarsal.  Prior studies available for comparison: yes               Assessment and Plan   Problem List Items Addressed " This Visit    None  Visit Diagnoses     Closed nondisplaced fracture of fifth metatarsal bone of left foot with routine healing, subsequent encounter    -  Primary    Relevant Orders    XR Foot 2 View Left (Completed)        Follow Up   Return in about 3 weeks (around 11/25/2022).    Patient Instructions   X-rays taken and reviewed. Patient advised to continue walking boot and recommend use of walker. Advised on gentle ankle ROM.     Follow up in 3-4 weeks. Repeat x-rays. Call with changes or concerns.     Patient was given instructions and counseling regarding her condition or for health maintenance advice. Please see specific information pulled into the AVS if appropriate.

## 2022-11-04 NOTE — PATIENT INSTRUCTIONS
X-rays taken and reviewed. Patient advised to continue walking boot and recommend use of walker. Advised on gentle ankle ROM.     Follow up in 3-4 weeks. Repeat x-rays. Call with changes or concerns.

## 2022-11-28 ENCOUNTER — OFFICE VISIT (OUTPATIENT)
Dept: ORTHOPEDIC SURGERY | Facility: CLINIC | Age: 87
End: 2022-11-28

## 2022-11-28 VITALS — WEIGHT: 150 LBS | BODY MASS INDEX: 23.54 KG/M2 | HEIGHT: 67 IN

## 2022-11-28 DIAGNOSIS — S92.355D CLOSED NONDISPLACED FRACTURE OF FIFTH METATARSAL BONE OF LEFT FOOT WITH ROUTINE HEALING, SUBSEQUENT ENCOUNTER: Primary | ICD-10-CM

## 2022-11-28 PROCEDURE — 99213 OFFICE O/P EST LOW 20 MIN: CPT | Performed by: PHYSICIAN ASSISTANT

## 2022-11-28 NOTE — PATIENT INSTRUCTIONS
X-rays taken and reviewed. Patient can start weaning out of boot as tolerated. Advised to continue gentle ROM exercises.     Follow up in 3 weeks. Repeat x-rays. Call with changes or concerns.

## 2022-11-28 NOTE — PROGRESS NOTES
"Chief Complaint  Follow-up and Pain of the Left Foot    Subjective      Becky Zavala presents to [facility] for follow-up of left fifth metatarsal fracture sustained on 9/25/2022.  She has been managed nonoperatively with immobilization in a walking boot.  She presents today with walking boot in place, reporting no complaints of pain.  She has come out of the walking boot and walk short distances without this and denies increased pain.    Objective   Allergies   Allergen Reactions   • Ciprofloxacin Other (See Comments)     neck pain; lips and eyes swelled   • Other      ALL DRUGS IN THE FLUOROQUINOLONES       Vital Signs:   Ht 170.2 cm (67\")   Wt 68 kg (150 lb)   BMI 23.49 kg/m²       Physical Exam    Constitutional: Awake, alert. Well nourished appearance.    Integumentary: Warm, dry, intact. No obvious rashes.    HENT: Atraumatic, normocephalic.   Respiratory: Non labored respirations .   Cardiovascular: Intact peripheral pulses.    Psychiatric: Normal mood and affect. A&O X3    Ortho Exam  Left foot: Skin is warm, dry, and intact.  Mild pedal edema noted.  No tenderness to palpation overlying left fifth metatarsal.  Limited ankle ROM with plantarflexion dorsiflexion, inversion, and eversion. Patient is able to wiggle her toes.  Sensation intact to light touch.  Distal neurovascular intact.      Imaging Results (Most Recent)     Procedure Component Value Units Date/Time    XR Foot 2 View Left [[id number]] Resulted: 11/28/22 1627     Updated: 11/28/22 1629    Narrative:      X-Ray Report:  Study: X-rays ordered, taken in the office, and reviewed today.   Site: Left foot Xray  Indication: Fracture  View: AP/Lateral view(s)  Findings:Well healing fracture to the base of the left 5th metatarsal  Prior studies available for comparison: yes               Assessment and Plan   Problem List Items Addressed This Visit    None  Visit Diagnoses     Closed nondisplaced fracture of fifth " metatarsal bone of left foot with routine healing, subsequent encounter    -  Primary        Follow Up   Return in about 3 weeks (around 12/19/2022).  Educated on risk of smoking. Discussed options for smoking cessation.    Patient Instructions   X-rays taken and reviewed. Patient can start weaning out of boot as tolerated. Advised to continue gentle ROM exercises.     Follow up in 3 weeks. Repeat x-rays. Call with changes or concerns.     Patient was given instructions and counseling regarding her condition or for health maintenance advice. Please see specific information pulled into the AVS if appropriate.

## 2022-12-06 RX ORDER — DOXYCYCLINE HYCLATE 100 MG/1
CAPSULE ORAL
Qty: 180 CAPSULE | Refills: 3 | OUTPATIENT
Start: 2022-12-06

## 2022-12-15 DIAGNOSIS — E03.9 ACQUIRED HYPOTHYROIDISM: ICD-10-CM

## 2022-12-15 DIAGNOSIS — E78.2 MIXED HYPERLIPIDEMIA: ICD-10-CM

## 2022-12-15 DIAGNOSIS — I48.0 PAROXYSMAL A-FIB: ICD-10-CM

## 2022-12-15 RX ORDER — LEVOTHYROXINE SODIUM 175 MCG
175 TABLET ORAL DAILY
Qty: 90 TABLET | Refills: 1 | Status: SHIPPED | OUTPATIENT
Start: 2022-12-15 | End: 2022-12-16 | Stop reason: SDUPTHER

## 2022-12-15 RX ORDER — SERTRALINE HYDROCHLORIDE 100 MG/1
100 TABLET, FILM COATED ORAL DAILY
Qty: 90 TABLET | Refills: 1 | Status: SHIPPED | OUTPATIENT
Start: 2022-12-15 | End: 2022-12-16 | Stop reason: SDUPTHER

## 2022-12-15 RX ORDER — POTASSIUM CHLORIDE 20 MEQ/1
20 TABLET, EXTENDED RELEASE ORAL 2 TIMES DAILY
Qty: 180 TABLET | Refills: 1 | Status: SHIPPED | OUTPATIENT
Start: 2022-12-15 | End: 2022-12-16 | Stop reason: SDUPTHER

## 2022-12-15 RX ORDER — PRAVASTATIN SODIUM 80 MG/1
80 TABLET ORAL NIGHTLY
Qty: 90 TABLET | Refills: 1 | Status: SHIPPED | OUTPATIENT
Start: 2022-12-15 | End: 2022-12-16 | Stop reason: SDUPTHER

## 2022-12-16 ENCOUNTER — OFFICE VISIT (OUTPATIENT)
Dept: FAMILY MEDICINE CLINIC | Age: 87
End: 2022-12-16

## 2022-12-16 ENCOUNTER — LAB (OUTPATIENT)
Dept: LAB | Facility: HOSPITAL | Age: 87
End: 2022-12-16

## 2022-12-16 VITALS
WEIGHT: 149.8 LBS | SYSTOLIC BLOOD PRESSURE: 139 MMHG | DIASTOLIC BLOOD PRESSURE: 75 MMHG | HEART RATE: 66 BPM | HEIGHT: 67 IN | TEMPERATURE: 97.6 F | BODY MASS INDEX: 23.51 KG/M2

## 2022-12-16 DIAGNOSIS — I48.0 PAROXYSMAL A-FIB: ICD-10-CM

## 2022-12-16 DIAGNOSIS — Z23 ENCOUNTER FOR IMMUNIZATION: ICD-10-CM

## 2022-12-16 DIAGNOSIS — E78.2 MIXED HYPERLIPIDEMIA: ICD-10-CM

## 2022-12-16 DIAGNOSIS — E20.9 HYPOPARATHYROIDISM, UNSPECIFIED HYPOPARATHYROIDISM TYPE: ICD-10-CM

## 2022-12-16 DIAGNOSIS — E03.9 ACQUIRED HYPOTHYROIDISM: ICD-10-CM

## 2022-12-16 DIAGNOSIS — F03.918 DEMENTIA WITH BEHAVIORAL DISTURBANCE: ICD-10-CM

## 2022-12-16 DIAGNOSIS — R73.9 HYPERGLYCEMIA: ICD-10-CM

## 2022-12-16 DIAGNOSIS — I48.0 PAROXYSMAL A-FIB: Primary | ICD-10-CM

## 2022-12-16 LAB
DEPRECATED RDW RBC AUTO: 42 FL (ref 37–54)
ERYTHROCYTE [DISTWIDTH] IN BLOOD BY AUTOMATED COUNT: 13.2 % (ref 12.3–15.4)
HCT VFR BLD AUTO: 40.9 % (ref 34–46.6)
HGB BLD-MCNC: 13.1 G/DL (ref 12–15.9)
MCH RBC QN AUTO: 28.3 PG (ref 26.6–33)
MCHC RBC AUTO-ENTMCNC: 32 G/DL (ref 31.5–35.7)
MCV RBC AUTO: 88.3 FL (ref 79–97)
PLATELET # BLD AUTO: 157 10*3/MM3 (ref 140–450)
PMV BLD AUTO: 11.1 FL (ref 6–12)
RBC # BLD AUTO: 4.63 10*6/MM3 (ref 3.77–5.28)
WBC NRBC COR # BLD: 6.7 10*3/MM3 (ref 3.4–10.8)

## 2022-12-16 PROCEDURE — 80053 COMPREHEN METABOLIC PANEL: CPT

## 2022-12-16 PROCEDURE — 90662 IIV NO PRSV INCREASED AG IM: CPT | Performed by: FAMILY MEDICINE

## 2022-12-16 PROCEDURE — 83970 ASSAY OF PARATHORMONE: CPT

## 2022-12-16 PROCEDURE — 84443 ASSAY THYROID STIM HORMONE: CPT

## 2022-12-16 PROCEDURE — 99214 OFFICE O/P EST MOD 30 MIN: CPT | Performed by: FAMILY MEDICINE

## 2022-12-16 PROCEDURE — 84439 ASSAY OF FREE THYROXINE: CPT

## 2022-12-16 PROCEDURE — 36415 COLL VENOUS BLD VENIPUNCTURE: CPT

## 2022-12-16 PROCEDURE — G0008 ADMIN INFLUENZA VIRUS VAC: HCPCS | Performed by: FAMILY MEDICINE

## 2022-12-16 PROCEDURE — 85027 COMPLETE CBC AUTOMATED: CPT

## 2022-12-16 PROCEDURE — 83036 HEMOGLOBIN GLYCOSYLATED A1C: CPT | Performed by: FAMILY MEDICINE

## 2022-12-16 PROCEDURE — 91312 COVID-19 (PFIZER) BIVALENT BOOSTER 12+YRS: CPT | Performed by: FAMILY MEDICINE

## 2022-12-16 PROCEDURE — 0124A COVID-19 (PFIZER) BIVALENT BOOSTER 12+YRS: CPT | Performed by: FAMILY MEDICINE

## 2022-12-16 RX ORDER — LEVOTHYROXINE SODIUM 175 MCG
175 TABLET ORAL DAILY
Qty: 90 TABLET | Refills: 3 | Status: SHIPPED | OUTPATIENT
Start: 2022-12-16 | End: 2023-02-21 | Stop reason: SDUPTHER

## 2022-12-16 RX ORDER — DABIGATRAN ETEXILATE 150 MG/1
150 CAPSULE ORAL 2 TIMES DAILY
Qty: 180 CAPSULE | Refills: 3 | Status: SHIPPED | OUTPATIENT
Start: 2022-12-16 | End: 2023-02-16

## 2022-12-16 RX ORDER — SERTRALINE HYDROCHLORIDE 100 MG/1
100 TABLET, FILM COATED ORAL DAILY
Qty: 90 TABLET | Refills: 3 | Status: SHIPPED | OUTPATIENT
Start: 2022-12-16

## 2022-12-16 RX ORDER — PRAVASTATIN SODIUM 80 MG/1
80 TABLET ORAL NIGHTLY
Qty: 90 TABLET | Refills: 3 | Status: SHIPPED | OUTPATIENT
Start: 2022-12-16

## 2022-12-16 RX ORDER — DILTIAZEM HYDROCHLORIDE 120 MG/1
120 CAPSULE, COATED, EXTENDED RELEASE ORAL DAILY
Qty: 90 CAPSULE | Refills: 3 | Status: SHIPPED | OUTPATIENT
Start: 2022-12-16

## 2022-12-16 RX ORDER — POTASSIUM CHLORIDE 20 MEQ/1
20 TABLET, EXTENDED RELEASE ORAL 2 TIMES DAILY
Qty: 180 TABLET | Refills: 3 | Status: ON HOLD | OUTPATIENT
Start: 2022-12-16 | End: 2023-02-03 | Stop reason: SDUPTHER

## 2022-12-16 RX ORDER — DOXYCYCLINE HYCLATE 100 MG/1
100 CAPSULE ORAL 2 TIMES DAILY
Qty: 180 CAPSULE | Refills: 3 | Status: SHIPPED | OUTPATIENT
Start: 2022-12-16

## 2022-12-16 RX ORDER — RALOXIFENE HYDROCHLORIDE 60 MG/1
60 TABLET, FILM COATED ORAL DAILY
Qty: 90 TABLET | Refills: 3 | Status: SHIPPED | OUTPATIENT
Start: 2022-12-16

## 2022-12-16 RX ORDER — RISPERIDONE 0.25 MG/1
0.25 TABLET ORAL
Qty: 30 TABLET | Refills: 0 | Status: SHIPPED | OUTPATIENT
Start: 2022-12-16 | End: 2023-02-03 | Stop reason: HOSPADM

## 2022-12-16 NOTE — PROGRESS NOTES
Becky Zavala presents to [facility].    Chief Complaint:  Blood pressure, atrial fibrillation, cholesterol, thyroid    Subjective       History of Present Illness:  Trinity is in today for follow-up on her care.  She has several health problems that we are following.  She has hypertension and her blood pressure is moderately elevated today.  Her pressure usually runs around 130 at home.  They have not had significant difficulty with the medications up to this time.    In addition, Trinity has atrial fibrillation and remains on treatment for this.  Part of her treatment includes Pradaxa.  She has not had any significant bleeding issues.    She also has hypothyroidism and elevated cholesterol and remains on treatment for these issues.    Review of Systems:  Review of Systems   Constitutional: Negative for chills and fever.   Respiratory: Negative for cough and shortness of breath.    Cardiovascular: Negative for chest pain and palpitations.   Gastrointestinal: Negative for abdominal pain, nausea and vomiting.        Objective   Medical History:  Past Medical History:   • AAA (abdominal aortic aneurysm)   • Alzheimer's disease, unspecified (HCC)   • Ankle sprain   • Anxiety   • Atrial fibrillation (HCC)   • Cataract   • Cellulitis   • Chronic atrial fibrillation (HCC)   • Chronic kidney disease   • COPD (chronic obstructive pulmonary disease) (HCC)   • Coronary artery disease   • Dementia (HCC)    SLIGHT PER DAUGHTER   • Disease of thyroid gland   • Diverticulitis of colon   • Essential (primary) hypertension   • Fracture, fibula   • Frequency of micturition   • Glaucoma   • Groin incision ulcer (HCC)    SMALL OPEN AREA TO LEFT GROIN   • Hemorrhage of anus and rectum   • History of heart attack   • HL (hearing loss)   • Hyperlipidemia   • Hypocalcemia   • Hypothyroidism   • Macular degeneration   • MSSA (methicillin susceptible Staphylococcus aureus) infection    CAUSE OF ANEURSYM PER  DAUGHTER STATED   • Myocardial infarction (HCC)   • Neck pain    LOWER    • Neoplasm of uncertain behavior of skin   • Neuropathy   • Osteopenia   • Osteoporosis   • Other hypoparathyroidism (HCC)   • Other vitamin B12 deficiency anemias   • PONV (postoperative nausea and vomiting)   • Popliteal aneurysm (HCC)    LEFT   • Pressure sore    LEFT HEEL DRESSING INTACT   • PVD (peripheral vascular disease) (HCC)   • UTI (urinary tract infection)   • Visual impairment     Past Surgical History:   • APPENDECTOMY   • CHOLECYSTECTOMY   • COLONOSCOPY   • COLONOSCOPY    NBIH, diverticulosis, multiple polyps benign per path    • ENDOSCOPY    normal    • EYE SURGERY   • FRACTURE SURGERY   • HYSTERECTOMY   • ORIF FEMUR FRACTURE    left tib fib   • WA REANEURYSM/GRFT INS,COMMON FEMORAL    Procedure: REPAIR OF LEFT MYCOTIC POPLITEAL ANEURYSM WITH ARTEGRAFT GRAFT, INTERPOSITION;  Surgeon: Randy Ott MD;  Location: [facility];  Service: Vascular   • WA VEIN IN SITU BYPASS GRAFT,FEM-POP    Procedure: RESECTION OF INFECTED LEFT FEMORAL ANEURYSM, EXTERNAL ILIAC TO SFA BYPASS WITH REIMPLANTATION PROFUNDA, AIF ARTERIOGRAM WITH LEFT LEG RUNOFF, SARTORIUS MUSCLE FLAP;  Surgeon: Randy Ott MD;  Location: [facility];  Service: Vascular   • THYROID SURGERY   • UPPER GASTROINTESTINAL ENDOSCOPY      Family History   Problem Relation Age of Onset   • Breast cancer Daughter    • Other Daughter         anaphalaxysis   • Cancer Daughter    • Cancer Daughter      Social History     Tobacco Use   • Smoking status: Never   • Smokeless tobacco: Never   Substance Use Topics   • Alcohol use: No       Health Maintenance Due   Topic Date Due   • DXA SCAN  07/13/2014   • COVID-19 Vaccine (4 - Booster for Pfizer series) 01/06/2022   • INFLUENZA VACCINE  08/01/2022        Immunization History   Administered Date(s) Administered   • COVID-19 (PFIZER) PURPLE CAP 03/22/2021, 04/12/2021, 11/11/2021   • Flu Vaccine Intradermal Quad 18-64YR  01/23/2013   • Fluzone High Dose =>65 Years ([employer] ONLY) 11/17/2017   • Fluzone Quad >6mos (Multi-dose) 11/18/2016   • Influenza, Unspecified 05/24/2021   • Pneumococcal Conjugate 13-Valent (PCV13) 03/16/2015   • Pneumococcal Polysaccharide (PPSV23) 11/09/2010   • Shingrix 01/21/2022, 04/19/2022   • Td 07/08/2018   • Tdap 07/08/2018, 07/08/2018       Allergies   Allergen Reactions   • Ciprofloxacin Other (See Comments)     neck pain; lips and eyes swelled   • Other      ALL DRUGS IN THE FLUOROQUINOLONES        Medications:  Current Outpatient Medications on File Prior to Visit   Medication Sig   • calcitriol (ROCALTROL) 0.5 MCG capsule Take 1 capsule by mouth 2 (Two) Times a Day.   • cholecalciferol (VITAMIN D3) 1000 UNITS tablet Take 2,000 Units by mouth Daily. HOLD PRIOR TO SURG   • cyanocobalamin 1000 MCG/ML injection Inject 1 mL into the appropriate muscle as directed by prescriber Every 30 (Thirty) Days.   • Melatonin 10 MG tablet Take 10 mg by mouth. PATIENT TAKES ONE EVERY EVENING/UNSURE IF TABLET OR CAPSULE   • Multiple Vitamins-Minerals (ICAPS AREDS 2 PO) Take 1 capsule by mouth 2 (Two) Times a Day.   • mupirocin (BACTROBAN) 2 % ointment Apply 1 application topically to the appropriate area as directed 2 (Two) Times a Day.   • timolol (TIMOPTIC) 0.5 % ophthalmic solution Administer 1 drop to both eyes 2 (Two) Times a Day.   • [DISCONTINUED] dabigatran etexilate (Pradaxa) 150 MG capsu Take 1 capsule by mouth 2 (Two) Times a Day.   • [DISCONTINUED] dilTIAZem CD (CARDIZEM CD) 120 MG 24 hr capsule Take 1 capsule by mouth Daily.   • [DISCONTINUED] doxycycline (VIBRAMYCIN) 100 MG capsule Take 100 mg by mouth 2 (Two) Times a Day.   • [DISCONTINUED] potassium chloride (KLOR-CON) 20 MEQ CR tablet Take 1 tablet by mouth 2 (Two) Times a Day.   • [DISCONTINUED] pravastatin (PRAVACHOL) 80 MG tablet Take 1 tablet by mouth Every Night.   • [DISCONTINUED] raloxifene (EVISTA) 60 MG tablet Take 1 tablet by mouth Daily.   •  "[DISCONTINUED] sertraline (ZOLOFT) 100 MG tablet Take 1 tablet by mouth Daily.   • [DISCONTINUED] Synthroid 175 MCG tablet Take 1 tablet by mouth Daily.     No current facility-administered medications on file prior to visit.       Vital Signs:   Vitals:    12/16/22 1520 12/16/22 1557   BP: 169/83 139/75   BP Location: Left arm Right arm   Patient Position: Sitting Sitting   Pulse: 65 66   Temp: 97.6 °F (36.4 °C)    TempSrc: Oral    Weight: 67.9 kg (149 lb 12.8 oz)    Height: 170.2 cm (67.01\")      Physical Exam:  Physical Exam  Vitals and nursing note reviewed.   Constitutional:       General: She is not in acute distress.     Appearance: She is not ill-appearing.   HENT:      Right Ear: Tympanic membrane and ear canal normal.      Left Ear: Tympanic membrane and ear canal normal.      Mouth/Throat:      Mouth: Mucous membranes are moist.      Comments: Pharynx appears normal  Eyes:      Extraocular Movements: Extraocular movements intact.      Pupils: Pupils are equal, round, and reactive to light.   Neck:      Thyroid: No thyromegaly.   Cardiovascular:      Rate and Rhythm: Normal rate and regular rhythm.      Heart sounds: No murmur heard.  Pulmonary:      Effort: Pulmonary effort is normal.      Breath sounds: Normal breath sounds.   Abdominal:      General: There is no distension.      Palpations: Abdomen is soft. There is no mass.      Tenderness: There is no abdominal tenderness.   Musculoskeletal:      Cervical back: Normal range of motion.   Skin:     Findings: No lesion or rash.   Neurological:      General: No focal deficit present.      Mental Status: Mental status is at baseline.      Cranial Nerves: No cranial nerve deficit.   Psychiatric:         Mood and Affect: Mood normal.         Result Review      The following data was reviewed by Surjit Gutierrez MD on 12/16/2022.  Lab Results   Component Value Date    WBC 7.90 06/17/2022    HGB 13.3 06/17/2022    HCT 42.9 06/17/2022    MCV 89.0 " 06/17/2022     06/17/2022     Lab Results   Component Value Date    GLUCOSE 158 (H) 10/06/2022    BUN 24 (H) 10/06/2022    CREATININE 0.92 10/06/2022     10/06/2022    K 4.2 10/06/2022     10/06/2022    CO2 29.4 (H) 10/06/2022    CALCIUM 9.9 10/06/2022    PROTEINTOT 5.9 (L) 06/17/2022    ALBUMIN 4.00 06/17/2022    ALT 15 06/17/2022    AST 22 06/17/2022    ALKPHOS 75 06/17/2022    BILITOT 0.5 06/17/2022    EGFRIFNONA 63 12/14/2021    GLOB 1.9 06/17/2022    AGRATIO 2.1 06/17/2022    BCR 26.1 (H) 10/06/2022    ANIONGAP 8.6 10/06/2022      Lab Results   Component Value Date    CHOL 142 06/17/2022    CHLPL 159 05/24/2021    TRIG 138 06/17/2022    HDL 34 (L) 06/17/2022    LDL 83 06/17/2022     Lab Results   Component Value Date    TSH 0.234 (L) 10/06/2022     Lab Results   Component Value Date    HGBA1C 5.40 12/14/2021            Assessment and Plan:   Today, we have reviewed her care.  Trinity is stable for the most part.  Her blood pressure has been in a good range at home.  We will plan to refill her usual medications as noted below and update some blood work as a precaution.  She does continue to struggle with some sundowning and agitation in the evenings.  After discussion about potential risks, we will give a prescription for a low-dose of risperidone to see if it is helpful.  That will be sent locally initially.  We might consider an increased dose over time.       Diagnoses and all orders for this visit:    1. Paroxysmal A-fib (HCC) (Primary)  -     dabigatran etexilate (Pradaxa) 150 MG capsu; Take 1 capsule by mouth 2 (Two) Times a Day.  Dispense: 180 capsule; Refill: 3  -     dilTIAZem CD (CARDIZEM CD) 120 MG 24 hr capsule; Take 1 capsule by mouth Daily.  Dispense: 90 capsule; Refill: 3  -     potassium chloride (KLOR-CON) 20 MEQ CR tablet; Take 1 tablet by mouth 2 (Two) Times a Day.  Dispense: 180 tablet; Refill: 3  -     CBC (No Diff); Future    2. Mixed hyperlipidemia  -     pravastatin  (PRAVACHOL) 80 MG tablet; Take 1 tablet by mouth Every Night.  Dispense: 90 tablet; Refill: 3  -     Comprehensive Metabolic Panel; Future    3. Acquired hypothyroidism  -     Synthroid 175 MCG tablet; Take 1 tablet by mouth Daily.  Dispense: 90 tablet; Refill: 3  -     TSH+Free T4; Future    4. Hypoparathyroidism, unspecified hypoparathyroidism type (HCC)  -     Comprehensive Metabolic Panel; Future  -     PTH, Intact; Future    5. Dementia with behavioral disturbance  -     sertraline (ZOLOFT) 100 MG tablet; Take 1 tablet by mouth Daily.  Dispense: 90 tablet; Refill: 3  -     risperiDONE (risperDAL) 0.25 MG tablet; Take 1 tablet by mouth every night at bedtime.  Dispense: 30 tablet; Refill: 0    6. Encounter for immunization  -     Fluzone High-Dose 65+yrs ([id number])  -     COVID-19 Bivalent Booster (Pfizer) 12+yrs    Other orders  -     doxycycline (VIBRAMYCIN) 100 MG capsule; Take 1 capsule by mouth 2 (Two) Times a Day.  Dispense: 180 capsule; Refill: 3  -     raloxifene (EVISTA) 60 MG tablet; Take 1 tablet by mouth Daily.  Dispense: 90 tablet; Refill: 3          Follow Up   Return in about 6 months (around 6/16/2023) for Recheck, Medicare Wellness.  Patient was given instructions and counseling regarding her condition or for health maintenance advice. Please see specific information pulled into the AVS if appropriate.

## 2022-12-17 LAB
ALBUMIN SERPL-MCNC: 3.9 G/DL (ref 3.5–5.2)
ALBUMIN/GLOB SERPL: 1.8 G/DL
ALP SERPL-CCNC: 70 U/L (ref 39–117)
ALT SERPL W P-5'-P-CCNC: 16 U/L (ref 1–33)
ANION GAP SERPL CALCULATED.3IONS-SCNC: 6.8 MMOL/L (ref 5–15)
AST SERPL-CCNC: 24 U/L (ref 1–32)
BILIRUB SERPL-MCNC: 0.6 MG/DL (ref 0–1.2)
BUN SERPL-MCNC: 25 MG/DL (ref 8–23)
BUN/CREAT SERPL: 30.1 (ref 7–25)
CALCIUM SPEC-SCNC: 9.8 MG/DL (ref 8.6–10.5)
CHLORIDE SERPL-SCNC: 106 MMOL/L (ref 98–107)
CO2 SERPL-SCNC: 32.2 MMOL/L (ref 22–29)
CREAT SERPL-MCNC: 0.83 MG/DL (ref 0.57–1)
EGFRCR SERPLBLD CKD-EPI 2021: 67.9 ML/MIN/1.73
GLOBULIN UR ELPH-MCNC: 2.2 GM/DL
GLUCOSE SERPL-MCNC: 125 MG/DL (ref 65–99)
HBA1C MFR BLD: 5.7 % (ref 4.8–5.6)
POTASSIUM SERPL-SCNC: 4.3 MMOL/L (ref 3.5–5.2)
PROT SERPL-MCNC: 6.1 G/DL (ref 6–8.5)
PTH-INTACT SERPL-MCNC: 2.5 PG/ML (ref 15–65)
SODIUM SERPL-SCNC: 145 MMOL/L (ref 136–145)
T4 FREE SERPL-MCNC: 1.42 NG/DL (ref 0.93–1.7)
TSH SERPL DL<=0.05 MIU/L-ACNC: 0.46 UIU/ML (ref 0.27–4.2)

## 2023-01-30 ENCOUNTER — HOSPITAL ENCOUNTER (OUTPATIENT)
Facility: HOSPITAL | Age: 88
Setting detail: OBSERVATION
Discharge: HOME | End: 2023-02-03
Attending: HOSPITALIST | Admitting: HOSPITALIST
Payer: MEDICARE

## 2023-01-30 ENCOUNTER — DOCUMENTATION (OUTPATIENT)
Dept: VASCULAR SURGERY | Facility: HOSPITAL | Age: 88
End: 2023-01-30
Payer: MEDICARE

## 2023-01-30 DIAGNOSIS — I48.0 PAROXYSMAL A-FIB: ICD-10-CM

## 2023-01-30 DIAGNOSIS — Z09 FOLLOW-UP EXAM: ICD-10-CM

## 2023-01-30 PROCEDURE — G0378 HOSPITAL OBSERVATION PER HR: HCPCS

## 2023-01-30 RX ORDER — NITROGLYCERIN 0.4 MG/1
0.4 TABLET SUBLINGUAL
Status: DISCONTINUED | OUTPATIENT
Start: 2023-01-30 | End: 2023-02-03 | Stop reason: HOSPADM

## 2023-01-31 ENCOUNTER — APPOINTMENT (OUTPATIENT)
Dept: OTHER | Facility: HOSPITAL | Age: 88
End: 2023-01-31
Payer: MEDICARE

## 2023-01-31 PROBLEM — D72.829 LEUKOCYTOSIS: Status: ACTIVE | Noted: 2023-01-31

## 2023-01-31 PROBLEM — R31.9 HEMATURIA: Status: ACTIVE | Noted: 2023-01-31

## 2023-01-31 PROBLEM — K62.5 RECTAL BLEEDING: Status: ACTIVE | Noted: 2023-01-31

## 2023-01-31 PROBLEM — K56.7 ILEUS: Status: ACTIVE | Noted: 2023-01-31

## 2023-01-31 PROBLEM — K62.5 RECTAL BLEED: Status: ACTIVE | Noted: 2023-01-31

## 2023-01-31 LAB
ANION GAP SERPL CALCULATED.3IONS-SCNC: 8.5 MMOL/L (ref 5–15)
BASOPHILS # BLD AUTO: 0.01 10*3/MM3 (ref 0–0.2)
BASOPHILS NFR BLD AUTO: 0.1 % (ref 0–1.5)
BUN SERPL-MCNC: 37 MG/DL (ref 8–23)
BUN/CREAT SERPL: 34.6 (ref 7–25)
CALCIUM SPEC-SCNC: 8.2 MG/DL (ref 8.6–10.5)
CHLORIDE SERPL-SCNC: 105 MMOL/L (ref 98–107)
CO2 SERPL-SCNC: 22.5 MMOL/L (ref 22–29)
CREAT SERPL-MCNC: 1.07 MG/DL (ref 0.57–1)
CRP SERPL-MCNC: 15.5 MG/DL (ref 0–0.5)
D-LACTATE SERPL-SCNC: 0.8 MMOL/L (ref 0.5–2)
DEPRECATED RDW RBC AUTO: 39.3 FL (ref 37–54)
EGFRCR SERPLBLD CKD-EPI 2021: 50.1 ML/MIN/1.73
EOSINOPHIL # BLD AUTO: 0.04 10*3/MM3 (ref 0–0.4)
EOSINOPHIL NFR BLD AUTO: 0.4 % (ref 0.3–6.2)
ERYTHROCYTE [DISTWIDTH] IN BLOOD BY AUTOMATED COUNT: 12.9 % (ref 12.3–15.4)
ERYTHROCYTE [SEDIMENTATION RATE] IN BLOOD: 16 MM/HR (ref 0–30)
GLUCOSE SERPL-MCNC: 122 MG/DL (ref 65–99)
HCT VFR BLD AUTO: 34 % (ref 34–46.6)
HCT VFR BLD AUTO: 35.7 % (ref 34–46.6)
HCT VFR BLD AUTO: 38.2 % (ref 34–46.6)
HGB BLD-MCNC: 11.5 G/DL (ref 12–15.9)
HGB BLD-MCNC: 11.6 G/DL (ref 12–15.9)
HGB BLD-MCNC: 12.6 G/DL (ref 12–15.9)
INR PPP: 1.64 (ref 0.9–1.1)
LYMPHOCYTES # BLD AUTO: 1.91 10*3/MM3 (ref 0.7–3.1)
LYMPHOCYTES NFR BLD AUTO: 19.1 % (ref 19.6–45.3)
MCH RBC QN AUTO: 28.3 PG (ref 26.6–33)
MCHC RBC AUTO-ENTMCNC: 33.8 G/DL (ref 31.5–35.7)
MCV RBC AUTO: 83.5 FL (ref 79–97)
MONOCYTES # BLD AUTO: 0.86 10*3/MM3 (ref 0.1–0.9)
MONOCYTES NFR BLD AUTO: 8.6 % (ref 5–12)
NEUTROPHILS NFR BLD AUTO: 7.14 10*3/MM3 (ref 1.7–7)
NEUTROPHILS NFR BLD AUTO: 71.3 % (ref 42.7–76)
PLATELET # BLD AUTO: 147 10*3/MM3 (ref 140–450)
PMV BLD AUTO: 10 FL (ref 6–12)
POTASSIUM SERPL-SCNC: 3.7 MMOL/L (ref 3.5–5.2)
PROTHROMBIN TIME: 19.6 SECONDS (ref 11.7–14.2)
RBC # BLD AUTO: 4.07 10*6/MM3 (ref 3.77–5.28)
SODIUM SERPL-SCNC: 136 MMOL/L (ref 136–145)
WBC NRBC COR # BLD: 10.01 10*3/MM3 (ref 3.4–10.8)

## 2023-01-31 PROCEDURE — 83605 ASSAY OF LACTIC ACID: CPT | Performed by: NURSE PRACTITIONER

## 2023-01-31 PROCEDURE — 85014 HEMATOCRIT: CPT | Performed by: NURSE PRACTITIONER

## 2023-01-31 PROCEDURE — 96361 HYDRATE IV INFUSION ADD-ON: CPT

## 2023-01-31 PROCEDURE — 85610 PROTHROMBIN TIME: CPT | Performed by: NURSE PRACTITIONER

## 2023-01-31 PROCEDURE — G0378 HOSPITAL OBSERVATION PER HR: HCPCS

## 2023-01-31 PROCEDURE — 85025 COMPLETE CBC W/AUTO DIFF WBC: CPT | Performed by: NURSE PRACTITIONER

## 2023-01-31 PROCEDURE — 86140 C-REACTIVE PROTEIN: CPT | Performed by: NURSE PRACTITIONER

## 2023-01-31 PROCEDURE — 96374 THER/PROPH/DIAG INJ IV PUSH: CPT

## 2023-01-31 PROCEDURE — 25010000002 ONDANSETRON PER 1 MG: Performed by: NURSE PRACTITIONER

## 2023-01-31 PROCEDURE — 80048 BASIC METABOLIC PNL TOTAL CA: CPT | Performed by: NURSE PRACTITIONER

## 2023-01-31 PROCEDURE — 85018 HEMOGLOBIN: CPT | Performed by: NURSE PRACTITIONER

## 2023-01-31 PROCEDURE — 99214 OFFICE O/P EST MOD 30 MIN: CPT | Performed by: INTERNAL MEDICINE

## 2023-01-31 PROCEDURE — 85652 RBC SED RATE AUTOMATED: CPT | Performed by: NURSE PRACTITIONER

## 2023-01-31 PROCEDURE — 99204 OFFICE O/P NEW MOD 45 MIN: CPT | Performed by: INTERNAL MEDICINE

## 2023-01-31 RX ORDER — CHOLECALCIFEROL (VITAMIN D3) 125 MCG
10 CAPSULE ORAL NIGHTLY PRN
Status: DISCONTINUED | OUTPATIENT
Start: 2023-01-31 | End: 2023-02-03 | Stop reason: HOSPADM

## 2023-01-31 RX ORDER — TIMOLOL MALEATE 5 MG/ML
1 SOLUTION/ DROPS OPHTHALMIC 2 TIMES DAILY
Status: DISCONTINUED | OUTPATIENT
Start: 2023-01-31 | End: 2023-02-03 | Stop reason: HOSPADM

## 2023-01-31 RX ORDER — SODIUM CHLORIDE 9 MG/ML
100 INJECTION, SOLUTION INTRAVENOUS CONTINUOUS
Status: DISCONTINUED | OUTPATIENT
Start: 2023-01-31 | End: 2023-01-31

## 2023-01-31 RX ORDER — ACETAMINOPHEN 325 MG/1
650 TABLET ORAL EVERY 4 HOURS PRN
Status: DISCONTINUED | OUTPATIENT
Start: 2023-01-31 | End: 2023-02-03 | Stop reason: HOSPADM

## 2023-01-31 RX ORDER — ACETAMINOPHEN 650 MG/1
650 SUPPOSITORY RECTAL EVERY 4 HOURS PRN
Status: DISCONTINUED | OUTPATIENT
Start: 2023-01-31 | End: 2023-02-03 | Stop reason: HOSPADM

## 2023-01-31 RX ORDER — DOXYCYCLINE 100 MG/1
100 CAPSULE ORAL EVERY 12 HOURS SCHEDULED
Status: DISCONTINUED | OUTPATIENT
Start: 2023-01-31 | End: 2023-02-03 | Stop reason: HOSPADM

## 2023-01-31 RX ORDER — SODIUM CHLORIDE 0.9 % (FLUSH) 0.9 %
10 SYRINGE (ML) INJECTION AS NEEDED
Status: DISCONTINUED | OUTPATIENT
Start: 2023-01-31 | End: 2023-02-03 | Stop reason: HOSPADM

## 2023-01-31 RX ORDER — SODIUM CHLORIDE 9 MG/ML
40 INJECTION, SOLUTION INTRAVENOUS AS NEEDED
Status: DISCONTINUED | OUTPATIENT
Start: 2023-01-31 | End: 2023-02-03 | Stop reason: HOSPADM

## 2023-01-31 RX ORDER — HYDROXYZINE HYDROCHLORIDE 25 MG/1
25 TABLET, FILM COATED ORAL 3 TIMES DAILY PRN
Status: DISCONTINUED | OUTPATIENT
Start: 2023-01-31 | End: 2023-02-03 | Stop reason: HOSPADM

## 2023-01-31 RX ORDER — PRAVASTATIN SODIUM 40 MG
80 TABLET ORAL NIGHTLY
Status: DISCONTINUED | OUTPATIENT
Start: 2023-01-31 | End: 2023-02-03 | Stop reason: HOSPADM

## 2023-01-31 RX ORDER — ACETAMINOPHEN 160 MG/5ML
650 SOLUTION ORAL EVERY 4 HOURS PRN
Status: DISCONTINUED | OUTPATIENT
Start: 2023-01-31 | End: 2023-02-03 | Stop reason: HOSPADM

## 2023-01-31 RX ORDER — ONDANSETRON 2 MG/ML
4 INJECTION INTRAMUSCULAR; INTRAVENOUS EVERY 6 HOURS PRN
Status: DISCONTINUED | OUTPATIENT
Start: 2023-01-31 | End: 2023-02-03 | Stop reason: HOSPADM

## 2023-01-31 RX ORDER — LEVOTHYROXINE SODIUM 175 UG/1
175 TABLET ORAL DAILY
Status: DISCONTINUED | OUTPATIENT
Start: 2023-01-31 | End: 2023-02-03 | Stop reason: HOSPADM

## 2023-01-31 RX ORDER — SODIUM CHLORIDE 0.9 % (FLUSH) 0.9 %
10 SYRINGE (ML) INJECTION EVERY 12 HOURS SCHEDULED
Status: DISCONTINUED | OUTPATIENT
Start: 2023-01-31 | End: 2023-02-03 | Stop reason: HOSPADM

## 2023-01-31 RX ORDER — DILTIAZEM HYDROCHLORIDE 120 MG/1
120 CAPSULE, COATED, EXTENDED RELEASE ORAL DAILY
Status: DISCONTINUED | OUTPATIENT
Start: 2023-01-31 | End: 2023-02-03 | Stop reason: HOSPADM

## 2023-01-31 RX ORDER — SERTRALINE HYDROCHLORIDE 100 MG/1
100 TABLET, FILM COATED ORAL DAILY
Status: DISCONTINUED | OUTPATIENT
Start: 2023-01-31 | End: 2023-02-03 | Stop reason: HOSPADM

## 2023-01-31 RX ORDER — POTASSIUM CHLORIDE 750 MG/1
20 TABLET, FILM COATED, EXTENDED RELEASE ORAL DAILY
Status: DISCONTINUED | OUTPATIENT
Start: 2023-01-31 | End: 2023-02-03 | Stop reason: HOSPADM

## 2023-01-31 RX ADMIN — TIMOLOL MALEATE 1 DROP: 5 SOLUTION/ DROPS OPHTHALMIC at 08:58

## 2023-01-31 RX ADMIN — POTASSIUM CHLORIDE 20 MEQ: 750 TABLET, EXTENDED RELEASE ORAL at 08:58

## 2023-01-31 RX ADMIN — SERTRALINE 100 MG: 100 TABLET, FILM COATED ORAL at 08:58

## 2023-01-31 RX ADMIN — DOXYCYCLINE 100 MG: 100 CAPSULE ORAL at 16:04

## 2023-01-31 RX ADMIN — LEVOTHYROXINE SODIUM 175 MCG: 0.17 TABLET ORAL at 08:58

## 2023-01-31 RX ADMIN — SODIUM CHLORIDE 100 ML/HR: 9 INJECTION, SOLUTION INTRAVENOUS at 02:33

## 2023-01-31 RX ADMIN — Medication 10 ML: at 02:35

## 2023-01-31 RX ADMIN — HYDROXYZINE HYDROCHLORIDE 25 MG: 25 TABLET ORAL at 16:40

## 2023-01-31 RX ADMIN — ONDANSETRON 4 MG: 2 INJECTION INTRAMUSCULAR; INTRAVENOUS at 10:47

## 2023-01-31 RX ADMIN — DILTIAZEM HYDROCHLORIDE 120 MG: 120 CAPSULE, COATED, EXTENDED RELEASE ORAL at 08:58

## 2023-01-31 RX ADMIN — Medication 10 MG: at 02:34

## 2023-01-31 NOTE — PROGRESS NOTES
"Called by the transfer center tonight about this patient.  She is followed in our practice by Dr. Ott.  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  She was last seen in the office a few months ago and abdominal aortic ultrasound showed a 3 cm aneurysm.  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  Patient is quite frail, 88, on Pradaxa, and with significant dementia.  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  Patient is hemodynamically stable.    Given her comorbidities it seems very unlikely that we would offer this woman aortic surgery due to unacceptable outcomes.  I cannot tell based on the description of the current CAT scan and her prior imaging how much of this is actually acute.  It sounds like the family is unlikely to consider surgical intervention regardless which seems practical.  However, they would like for her to be transferred here for evaluation.  I think that is reasonable.  "

## 2023-02-01 LAB
ALBUMIN SERPL-MCNC: 3.3 G/DL (ref 3.5–5.2)
ALBUMIN/GLOB SERPL: 1.6 G/DL
ALP SERPL-CCNC: 76 U/L (ref 39–117)
ALT SERPL W P-5'-P-CCNC: 14 U/L (ref 1–33)
ANION GAP SERPL CALCULATED.3IONS-SCNC: 11.2 MMOL/L (ref 5–15)
AST SERPL-CCNC: 23 U/L (ref 1–32)
BILIRUB SERPL-MCNC: 0.5 MG/DL (ref 0–1.2)
BUN SERPL-MCNC: 36 MG/DL (ref 8–23)
BUN/CREAT SERPL: 49.3 (ref 7–25)
CALCIUM SPEC-SCNC: 8.4 MG/DL (ref 8.6–10.5)
CHLORIDE SERPL-SCNC: 110 MMOL/L (ref 98–107)
CO2 SERPL-SCNC: 22.8 MMOL/L (ref 22–29)
CREAT SERPL-MCNC: 0.73 MG/DL (ref 0.57–1)
CRP SERPL-MCNC: 8.43 MG/DL (ref 0–0.5)
D-LACTATE SERPL-SCNC: 0.7 MMOL/L (ref 0.5–2)
DEPRECATED RDW RBC AUTO: 40.2 FL (ref 37–54)
EGFRCR SERPLBLD CKD-EPI 2021: 79.2 ML/MIN/1.73
ERYTHROCYTE [DISTWIDTH] IN BLOOD BY AUTOMATED COUNT: 13 % (ref 12.3–15.4)
GLOBULIN UR ELPH-MCNC: 2.1 GM/DL
GLUCOSE SERPL-MCNC: 110 MG/DL (ref 65–99)
HCT VFR BLD AUTO: 34.6 % (ref 34–46.6)
HCT VFR BLD AUTO: 35.9 % (ref 34–46.6)
HCT VFR BLD AUTO: 37.6 % (ref 34–46.6)
HGB BLD-MCNC: 11.5 G/DL (ref 12–15.9)
HGB BLD-MCNC: 11.9 G/DL (ref 12–15.9)
HGB BLD-MCNC: 12.2 G/DL (ref 12–15.9)
LIPASE SERPL-CCNC: 30 U/L (ref 13–60)
MAGNESIUM SERPL-MCNC: 1.8 MG/DL (ref 1.6–2.4)
MCH RBC QN AUTO: 28.6 PG (ref 26.6–33)
MCHC RBC AUTO-ENTMCNC: 33.1 G/DL (ref 31.5–35.7)
MCV RBC AUTO: 86.3 FL (ref 79–97)
PLATELET # BLD AUTO: 174 10*3/MM3 (ref 140–450)
PMV BLD AUTO: 10 FL (ref 6–12)
POTASSIUM SERPL-SCNC: 4 MMOL/L (ref 3.5–5.2)
PROT SERPL-MCNC: 5.4 G/DL (ref 6–8.5)
RBC # BLD AUTO: 4.16 10*6/MM3 (ref 3.77–5.28)
SODIUM SERPL-SCNC: 144 MMOL/L (ref 136–145)
WBC NRBC COR # BLD: 8.32 10*3/MM3 (ref 3.4–10.8)

## 2023-02-01 PROCEDURE — 86140 C-REACTIVE PROTEIN: CPT | Performed by: HOSPITALIST

## 2023-02-01 PROCEDURE — G0378 HOSPITAL OBSERVATION PER HR: HCPCS

## 2023-02-01 PROCEDURE — 85014 HEMATOCRIT: CPT | Performed by: NURSE PRACTITIONER

## 2023-02-01 PROCEDURE — 83605 ASSAY OF LACTIC ACID: CPT | Performed by: HOSPITALIST

## 2023-02-01 PROCEDURE — 85018 HEMOGLOBIN: CPT | Performed by: NURSE PRACTITIONER

## 2023-02-01 PROCEDURE — 83735 ASSAY OF MAGNESIUM: CPT | Performed by: HOSPITALIST

## 2023-02-01 PROCEDURE — 99214 OFFICE O/P EST MOD 30 MIN: CPT | Performed by: INTERNAL MEDICINE

## 2023-02-01 PROCEDURE — 97162 PT EVAL MOD COMPLEX 30 MIN: CPT

## 2023-02-01 PROCEDURE — 97110 THERAPEUTIC EXERCISES: CPT

## 2023-02-01 PROCEDURE — 80053 COMPREHEN METABOLIC PANEL: CPT | Performed by: HOSPITALIST

## 2023-02-01 PROCEDURE — 83690 ASSAY OF LIPASE: CPT | Performed by: HOSPITALIST

## 2023-02-01 PROCEDURE — 85027 COMPLETE CBC AUTOMATED: CPT | Performed by: HOSPITALIST

## 2023-02-01 RX ADMIN — PRAVASTATIN SODIUM 80 MG: 40 TABLET ORAL at 00:24

## 2023-02-01 RX ADMIN — Medication 10 ML: at 20:35

## 2023-02-01 RX ADMIN — DILTIAZEM HYDROCHLORIDE 120 MG: 120 CAPSULE, COATED, EXTENDED RELEASE ORAL at 10:35

## 2023-02-01 RX ADMIN — DOXYCYCLINE 100 MG: 100 CAPSULE ORAL at 10:35

## 2023-02-01 RX ADMIN — DOXYCYCLINE 100 MG: 100 CAPSULE ORAL at 00:24

## 2023-02-01 RX ADMIN — Medication 10 ML: at 10:36

## 2023-02-01 RX ADMIN — POTASSIUM CHLORIDE 20 MEQ: 750 TABLET, EXTENDED RELEASE ORAL at 10:35

## 2023-02-01 RX ADMIN — PRAVASTATIN SODIUM 80 MG: 40 TABLET ORAL at 20:35

## 2023-02-01 RX ADMIN — Medication 10 ML: at 00:24

## 2023-02-01 RX ADMIN — TIMOLOL MALEATE 1 DROP: 5 SOLUTION/ DROPS OPHTHALMIC at 10:35

## 2023-02-01 RX ADMIN — TIMOLOL MALEATE 1 DROP: 5 SOLUTION/ DROPS OPHTHALMIC at 20:35

## 2023-02-01 RX ADMIN — SERTRALINE 100 MG: 100 TABLET, FILM COATED ORAL at 10:35

## 2023-02-01 RX ADMIN — LEVOTHYROXINE SODIUM 175 MCG: 0.17 TABLET ORAL at 06:19

## 2023-02-01 RX ADMIN — DOXYCYCLINE 100 MG: 100 CAPSULE ORAL at 20:35

## 2023-02-01 RX ADMIN — TIMOLOL MALEATE 1 DROP: 5 SOLUTION/ DROPS OPHTHALMIC at 00:24

## 2023-02-01 NOTE — PLAN OF CARE
Goal Outcome Evaluation:              Outcome Evaluation: Pt admitted from home with rectal bleed.  Dtr reports pt has 24/7 assist, caregivers during the day and family at nights and weekends.  Pt normally doesn't use walker but does own one.  Pt able to ambulate CGA with walker for 50', then another 50' w/ CGA-min A without UE support.  Recommend sitting up in chair few hours/day and ambulating with assist x1. Recommend DC to home; dtr declines need for home PT.

## 2023-02-01 NOTE — THERAPY EVALUATION
Patient Name: Becky Zavala  : 1934    MRN: [id number]                              Today's Date: 2023       Admit Date: 2023    Visit Dx:     ICD-10-CM ICD-9-CM   1. 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)   • GI hemorrhage   • Upper GI bleed   • Gastrointestinal hemorrhage with melena   • Hypoparathyroidism (HCC)   • B12 deficiency   • Contusion   • Dementia with behavioral disturbance   • Essential hypertension   • Rectal bleed   • Leukocytosis   • Hematuria   • Ileus (HCC)   • Rectal bleeding   • AAA (abdominal aortic aneurysm)     Past Medical History:   Diagnosis Date   • AAA (abdominal aortic aneurysm)    • Alzheimer's disease, unspecified (HCC)    • Ankle sprain 2022   • Anxiety    • Atrial fibrillation (HCC)    • Cataract    • Cellulitis    • Chronic atrial fibrillation (HCC)    • Chronic kidney disease    • COPD (chronic obstructive pulmonary disease) (HCC)    • Coronary artery disease    • Dementia (HCC)     SLIGHT PER DAUGHTER   • Disease of thyroid gland    • Diverticulitis of colon    • Essential (primary) hypertension    • Fracture, fibula    • Frequency of micturition    • Glaucoma    • Groin incision ulcer (HCC)     SMALL OPEN AREA TO LEFT GROIN   • Hemorrhage of anus and rectum    • History of heart attack    • HL (hearing loss)    • Hyperlipidemia    • Hypocalcemia    • Hypothyroidism    • Macular degeneration    • MSSA (methicillin susceptible Staphylococcus  aureus) infection     CAUSE OF ANEURSYM PER DAUGHTER STATED   • Myocardial infarction (HCC)    • Neck pain     LOWER    • Neoplasm of uncertain behavior of skin    • Neuropathy    • Osteopenia    • Osteoporosis    • Other hypoparathyroidism (HCC)    • Other vitamin B12 deficiency anemias    • PONV (postoperative nausea and vomiting)    • Popliteal aneurysm (HCC)     LEFT   • Pressure sore     LEFT HEEL DRESSING INTACT   • PVD (peripheral vascular disease) (HCC)    • UTI (urinary tract infection)    • Visual impairment      Past Surgical History:   Procedure Laterality Date   • APPENDECTOMY     • CHOLECYSTECTOMY     • COLONOSCOPY     • COLONOSCOPY N/A 06/27/2019    NBIH, diverticulosis, multiple polyps benign per path    • ENDOSCOPY N/A 06/26/2019    normal    • EYE SURGERY     • FRACTURE SURGERY     • HYSTERECTOMY     • ORIF FEMUR FRACTURE  02/18/2016    left tib fib   • DE DIR RPR ANEURYSM & GRAFT COMMON FEMORAL ARTERY Left 12/09/2016    Procedure: REPAIR OF LEFT MYCOTIC POPLITEAL ANEURYSM WITH ARTEGRAFT GRAFT, INTERPOSITION;  Surgeon: Randy Ott MD;  Location: [facility];  Service: Vascular   • DE IN-SITU VEIN BYPASS FEMORAL-POPLITEAL Left 10/07/2016    Procedure: RESECTION OF INFECTED LEFT FEMORAL ANEURYSM, EXTERNAL ILIAC TO SFA BYPASS WITH REIMPLANTATION PROFUNDA, AIF ARTERIOGRAM WITH LEFT LEG RUNOFF, SARTORIUS MUSCLE FLAP;  Surgeon: Randy Ott MD;  Location: [facility];  Service: Vascular   • THYROID SURGERY     • UPPER GASTROINTESTINAL ENDOSCOPY  06/27/2019      General Information     Row Name 02/01/23 1254          Physical Therapy Time and Intention    Document Type evaluation  -AR     Mode of Treatment physical therapy  -AR     Row Name 02/01/23 1254          General Information    Patient Profile Reviewed yes  -AR     Prior Level of Function min assist:  normally no AD, no falls, has caregivers during the day and family at nights and weekends.  -AR     Existing Precautions/Restrictions  fall  -AR     Barriers to Rehab none identified  -AR     Row Name 02/01/23 1254          Living Environment    People in Home alone  has 24/7 assist  -AR     Row Name 02/01/23 1254          Home Main Entrance    Number of Stairs, Main Entrance one  -AR     Row Name 02/01/23 1254          Cognition    Orientation Status (Cognition) oriented to;person;place  -AR     Row Name 02/01/23 1254          Safety Issues, Functional Mobility    Safety Issues Affecting Function (Mobility) insight into deficits/self-awareness  -AR     Impairments Affecting Function (Mobility) balance;cognition;strength;endurance/activity tolerance  -AR           User Key  (r) = Recorded By, (t) = Taken By, (c) = Cosigned By    Initials Name Provider Type    AR Philly Marques, PT Physical Therapist               Mobility     Row Name 02/01/23 1255          Bed Mobility    Bed Mobility supine-sit;sit-supine  -AR     Supine-Sit [place] (Bed Mobility) standby assist  -AR     Sit-Supine [place] (Bed Mobility) not tested  -AR     Assistive Device (Bed Mobility) bed rails;head of bed elevated  -AR     Row Name 02/01/23 1255          Sit-Stand Transfer    Sit-Stand [place] (Transfers) contact guard  -AR     Assistive Device (Sit-Stand Transfers) walker, front-wheeled  -AR     Row Name 02/01/23 1255          Gait/Stairs (Locomotion)    [place]rest Level (Gait) minimum assist (75% patient effort);contact guard  -AR     Assistive Device (Gait) walker, front-wheeled  -AR     Distance in Feet (Gait) CGA with walker for 50', then another 50' w/ CGA-min A without UE support  -AR     Deviations/Abnormal Patterns (Gait) festinating/shuffling;gait speed decreased  -AR     Bilateral Gait Deviations heel strike decreased  -AR           User Key  (r) = Recorded By, (t) = Taken By, (c) = Cosigned By    Initials Name Provider Type    AR Philly Marques, PT Physical Therapist               Obj/Interventions     Row Name 02/01/23 1256          Range of  Motion Comprehensive    Comment, General Range of Motion B LE WFL  -AR     Row Name 02/01/23 1256          Strength Comprehensive (MMT)    Comment, General Manual Muscle Testing (MMT) Assessment B LE 4/5  -AR     Row Name 02/01/23 1256          Balance    Balance Assessment standing dynamic balance  -AR     Dynamic Standing Balance minimal assist  -AR           User Key  (r) = Recorded By, (t) = Taken By, (c) = Cosigned By    Initials Name Provider Type    AR Philly Marques, PT Physical Therapist               Goals/Plan     Row Name 02/01/23 1303          Transfer Goal 1 (PT)    Activity/Assistive Device (Transfer Goal 1, PT) sit-to-stand/stand-to-sit;bed-to-chair/chair-to-bed  -AR     [place] Level/Cues Needed (Transfer Goal 1, PT) standby assist  -AR     Time Frame (Transfer Goal 1, PT) 1 week  -AR     Row Name 02/01/23 1303          Gait Training Goal 1 (PT)    Activity/Assistive Device (Gait Training Goal 1, PT) gait (walking locomotion)  -AR     [place] Level (Gait Training Goal 1, PT) standby assist  -AR     Distance (Gait Training Goal 1, PT) 200  -AR     Time Frame (Gait Training Goal 1, PT) 1 week  -AR     Row Name 02/01/23 1303          Therapy Assessment/Plan (PT)    Planned Therapy Interventions (PT) balance training;bed mobility training;gait training;home exercise program;patient/family education;transfer training;ROM (range of motion);stair training;strengthening  -AR           User Key  (r) = Recorded By, (t) = Taken By, (c) = Cosigned By    Initials Name Provider Type    AR Philly Marques, PT Physical Therapist               Clinical Impression     Row Name 02/01/23 1256          Pain    Pretreatment Pain Rating 0/10 - no pain  -AR     Posttreatment Pain Rating 0/10 - no pain  -AR     Pain Intervention(s) Repositioned  -AR     Row Name 02/01/23 1256          Plan of Care Review    Outcome Evaluation Pt admitted from home with rectal bleed.  Dtr reports pt has 24/7 assist, caregivers  during the day and family at nights and weekends.  Pt normally doesn't use walker but does own one.  Pt able to ambulate CGA with walker for 50', then another 50' w/ CGA-min A without UE support.  Recommend sitting up in chair few hours/day and ambulating with assist x1. Recommend DC to home; dtr declines need for home PT.  -AR     Row Name 02/01/23 1256          Therapy Assessment/Plan (PT)    Rehab Potential (PT) good, to achieve stated therapy goals  -AR     Criteria for Skilled Interventions Met (PT) yes  -AR     Therapy Frequency (PT) 3 times/wk  -AR     Row Name 02/01/23 1256          Vital Signs    O2 Delivery Pre Treatment room air  -AR     Row Name 02/01/23 1256          Positioning and Restraints    Pre-Treatment Position in bed  -AR     Post Treatment Position chair  -AR     In Chair notified nsg;reclined;sitting;call light within reach;encouraged to call for assist;exit alarm on;with family/caregiver  -AR           User Key  (r) = Recorded By, (t) = Taken By, (c) = Cosigned By    Initials Name Provider Type    AR Philly Marques, PT Physical Therapist               Outcome Measures     Row Name 02/01/23 1306          How much help from another person do you currently need...    Turning from your back to your side while in flat bed without using bedrails? 4  -AR     Moving from lying on back to sitting on the side of a flat bed without bedrails? 3  -AR     Moving to and from a bed to a chair (including a wheelchair)? 3  -AR     Standing up from a chair using your arms (e.g., wheelchair, bedside chair)? 3  -AR     Climbing 3-5 steps with a railing? 2  -AR     To walk in hospital room? 3  -AR     AM-PAC 6 Clicks Score (PT) 18  -AR     Highest level of mobility 6 --> Walked 10 steps or more  -AR     Row Name 02/01/23 1303          Functional Assessment    Outcome Measure Options AM-PAC 6 Clicks Basic Mobility (PT)  -AR           User Key  (r) = Recorded By, (t) = Taken By, (c) = Cosigned By    Initials Name  Provider Type    AR Philly Marques PT Physical Therapist                             Physical Therapy Education     Title: PT OT SLP Therapies (Done)     Topic: Physical Therapy (Done)     Point: Mobility training (Done)     Learning Progress Summary           Patient Acceptance, E, VU by AR at 2/1/2023 1303   Family Acceptance, E, VU by AR at 2/1/2023 1303                   Point: Home exercise program (Done)     Learning Progress Summary           Patient Acceptance, E, VU by AR at 2/1/2023 1303   Family Acceptance, E, VU by AR at 2/1/2023 1303                   Point: Body mechanics (Done)     Learning Progress Summary           Patient Acceptance, E, VU by AR at 2/1/2023 1303   Family Acceptance, E, VU by AR at 2/1/2023 1303                   Point: Precautions (Done)     Learning Progress Summary           Patient Acceptance, E, VU by AR at 2/1/2023 1303   Family Acceptance, E, VU by AR at 2/1/2023 1303                               User Key     Initials Effective Dates Name Provider Type Discipline    AR 06/16/21 -  Philly Marques PT Physical Therapist PT              PT Recommendation and Plan  Planned Therapy Interventions (PT): balance training, bed mobility training, gait training, home exercise program, patient/family education, transfer training, ROM (range of motion), stair training, strengthening  Outcome Evaluation: Pt admitted from home with rectal bleed.  Dtr reports pt has 24/7 assist, caregivers during the day and family at nights and weekends.  Pt normally doesn't use walker but does own one.  Pt able to ambulate CGA with walker for 50', then another 50' w/ CGA-min A without UE support.  Recommend sitting up in chair few hours/day and ambulating with assist x1. Recommend DC to home; dtr declines need for home PT.     Time Calculation:    PT Charges     Row Name 02/01/23 1252             Time Calculation    Start Time 1050  -AR      Stop Time 1101  -AR      Time Calculation (min) 11 min  -AR       PT Received On 02/01/23  -AR      PT - Next Appointment 02/03/23  -AR      PT Goal Re-Cert Due Date 02/08/23  -AR            User Key  (r) = Recorded By, (t) = Taken By, (c) = Cosigned By    Initials Name Provider Type    Philly Rocha PT Physical Therapist              Therapy Charges for Today     Code Description Service Date Service Provider Modifiers Qty    28665779877 HC PT EVAL MOD COMPLEXITY 4 2/1/2023 Philly Marques, PT GP 1    65601677512 HC PT THER PROC EA 15 MIN 2/1/2023 Philly Marques, PT GP 1    35533276689 HC PT THER SUPP EA 15 MIN 2/1/2023 Philly Marques, PT GP 1          PT G-Codes  Outcome Measure Options: AM-PAC 6 Clicks Basic Mobility (PT)  AM-PAC 6 Clicks Score (PT): 18  PT Discharge Summary  Anticipated Discharge Disposition (PT): home with 24/7 care    Philly Marques PT  2/1/2023

## 2023-02-01 NOTE — PROGRESS NOTES
ID PROGRESS NOTE    CC: f/u [facility]    Subj: History from pt's daughter. She slept well. Tolerating doxycycline w/o rash. No fever.     Medications:    Current Facility-Administered Medications:   •  acetaminophen (TYLENOL) tablet 650 mg, 650 mg, Oral, Q4H PRN **OR** acetaminophen (TYLENOL) 160 MG/5ML solution 650 mg, 650 mg, Oral, Q4H PRN **OR** acetaminophen (TYLENOL) suppository 650 mg, 650 mg, Rectal, Q4H PRN, Opal Everett APRN  •  dilTIAZem CD (CARDIZEM CD) 24 hr capsule 120 mg, 120 mg, Oral, Daily, Opal Everett APRN, 120 mg at 01/31/23 0858  •  doxycycline (MONODOX) capsule 100 mg, 100 mg, Oral, Q12H, Roque Bangura MD, 100 mg at 02/01/23 0024  •  hydrOXYzine (ATARAX) tablet 25 mg, 25 mg, Oral, TID PRN, Haile Dubose MD, 25 mg at 01/31/23 1640  •  levothyroxine (SYNTHROID, LEVOTHROID) tablet 175 mcg, 175 mcg, Oral, Daily, Opal Everett APRN, 175 mcg at 02/01/23 0619  •  melatonin tablet 10 mg, 10 mg, Oral, Nightly PRN, Opal Everett APRN, 10 mg at 01/31/23 0234  •  nitroglycerin (NITROSTAT) SL tablet 0.4 mg, 0.4 mg, Sublingual, Q5 Min PRN, Randy Morris MD  •  ondansetron (ZOFRAN) injection 4 mg, 4 mg, Intravenous, Q6H PRN, Opal Everett APRN, 4 mg at 01/31/23 1047  •  potassium chloride (K-DUR,KLOR-CON) ER tablet 20 mEq, 20 mEq, Oral, Daily, Opal Everett APRN, 20 mEq at 01/31/23 0858  •  pravastatin (PRAVACHOL) tablet 80 mg, 80 mg, Oral, Nightly, Opal Everett APRN, 80 mg at 02/01/23 0024  •  sertraline (ZOLOFT) tablet 100 mg, 100 mg, Oral, Daily, Opal Everett APRN, 100 mg at 01/31/23 0858  •  sodium chloride 0.9 % flush 10 mL, 10 mL, Intravenous, Q12H, Opal Everett APRN, 10 mL at 02/01/23 0024  •  sodium chloride 0.9 % flush 10 mL, 10 mL, Intravenous, PRN, Opal Everett APRN  •  sodium chloride 0.9 % infusion 40 mL, 40 mL, Intravenous, PRN, Opal Everett APRN  •  timolol (TIMOPTIC) 0.5 % ophthalmic  "solution 1 drop, 1 drop, Both Eyes, BID, Opal Everett, APRN, 1 drop at 02/01/23 0024      Objective   Vital Signs   Temp:  [97.7 °F (36.5 °C)-99.7 °F (37.6 °C)] 99.4 °F (37.4 °C)  Heart Rate:  [67-86] 67  Resp:  [16-18] 16  BP: (143-156)/(79-93) 156/84    Physical Exam:   General: NAD  Eyes: no scleral icterus  ENT: no thrush  Cardiovascular: NR  Respiratory: normal work of breathing on RA  GI: Abdomen is soft  :  no Fernando catheter  Skin: No rashes    Labs:   Hgb, Hct reviewed today; AM labs ordered (CBC, CMP)  Lab Results   Component Value Date    WBC 10.01 01/31/2023    HGB 11.5 (L) 02/01/2023    HCT 34.6 02/01/2023    MCV 83.5 01/31/2023     01/31/2023       Lab Results   Component Value Date    GLUCOSE 122 (H) 01/31/2023    BUN 37 (H) 01/31/2023    CREATININE 1.07 (H) 01/31/2023    EGFRIFNONA 63 12/14/2021    BCR 34.6 (H) 01/31/2023    CO2 22.5 01/31/2023    CALCIUM 8.2 (L) 01/31/2023    ALBUMIN 3.90 12/16/2022    LABIL2 1.5 05/24/2021    AST 24 12/16/2022    ALT 16 12/16/2022     Lab Results   Component Value Date    INR 1.64 (H) 01/31/2023    INR 1.18 (H) 06/27/2019    INR 1.40 (H) 06/24/2019    PROTIME 19.6 (H) 01/31/2023    PROTIME 14.7 (H) 06/27/2019    PROTIME 16.8 (H) 06/24/2019       Microbiology:  None at [facility] or [facility]    Radiology:  CT A/P:  \"1. Abnormal appearance of the mid abdominal aorta with contained rupture/pseudoaneurysm arising from the left posterolateral aspect with measurements as above. No evidence of active bleeding.   2. No evidence of aortic dissection.   3. Fluid-filled large and small bowel which may be due to ileus or enterocolitis.     ASSESSMENT/PLAN:  1. Bright red blood per rectum  2. 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 Long term use of antibiotics  4. Dementia  5. Afib on Pradaxa  6. Ileus vs enteritis    Reviewed vascular surgery. No appreciable change in her aneurysm. No need/role for intervention at this time. I " continue to recommend that she remain on her on lifelong doxycycline 100 mg PO for chronic suppression. D/w her daughter who confirmed she is still taking it. It is now being prescribed by her PCP closer to home.     Thanks to GI for their evaluation re: rectal bleeding.     Thank you for allowing me to be involved in the care of this patient. Infectious diseases will sign off at this time with antibiotics plan in place, but please call me at [phone] if any further ID questions or new ID concerns.

## 2023-02-01 NOTE — PROGRESS NOTES
Name: Becky Zavala ADMIT: 2023   : 1934  PCP: Surjit Gutierrez MD    MRN: [id number] LOS: 0 days   AGE/SEX: 88 y.o. female  ROOM: [address]    [place], Subsequent Hospital Care      CC: Chronic stable mycotic aneurysm  Subjective     88 y.o. female with a chronic stable mycotic aneurysm.  I reviewed all films back to 2016 comparing to the new films from this month.  There seems to be no change from her last studies in 2017 by CT chest now.  The study is basically identical.  I do not believe any of her symptoms are related to her aneurysm believe that chronic antibiotic therapy is not a job keeping this contained.  I do not believe there is anything we could do even if it was getting worse and her family has been made aware of this.    Review of Systems sleeping.  Discussion with daughter    Objective     Scheduled Medications:   dilTIAZem CD, 120 mg, Oral, Daily  doxycycline, 100 mg, Oral, Q12H  levothyroxine, 175 mcg, Oral, Daily  potassium chloride, 20 mEq, Oral, Daily  pravastatin, 80 mg, Oral, Nightly  sertraline, 100 mg, Oral, Daily  sodium chloride, 10 mL, Intravenous, Q12H  timolol, 1 drop, Both Eyes, BID        Active Infusions:       As Needed Medications:  •  acetaminophen **OR** acetaminophen **OR** acetaminophen  •  hydrOXYzine  •  melatonin  •  nitroglycerin  •  ondansetron  •  sodium chloride  •  sodium chloride    Vital Signs  Vital Signs Patient Vitals for the past 24 hrs:   BP Temp Temp src Pulse Resp SpO2   23 0718 156/84 -- -- 67 16 91 %   23 2332 153/87 99.4 °F (37.4 °C) Oral -- 16 --   23 1949 143/79 99.7 °F (37.6 °C) Oral 72 16 96 %   23 1550 151/91 97.7 °F (36.5 °C) Oral 86 16 99 %   23 1152 150/93 98.4 °F (36.9 °C) Oral 70 18 97 %     Vital Signs (range)  Temp:  [97.7 °F (36.5 °C)-99.7 °F (37.6 °C)] 99.4 °F (37.4 °C)  Heart Rate:  [67-86] 67  Resp:  [16-18] 16  BP: (143-156)/(79-93) 156/84  I/O:  I/O last 3 completed  shifts:  In: 210 [P.O.:210]  Out: -   I/O:   Intake/Output Summary (Last 24 hours) at 2/1/2023 0742  Last data filed at 1/31/2023 1758  Gross per 24 hour   Intake 210 ml   Output --   Net 210 ml     BMI:  Body mass index is 22.99 kg/m².    Physical Exam:  Physical Exam   Lungs clear and equal  Abdomen nondistended fairly soft.  Does not really wake up when I press on it.  Extremities      Results Review:     CBC    Results from last 7 days   Lab Units 02/01/23  0009 01/31/23  1633 01/31/23  0942 01/31/23  0305   WBC 10*3/mm3  --   --   --  10.01   HEMOGLOBIN g/dL 11.5* 12.6 11.6* 11.5*   PLATELETS 10*3/mm3  --   --   --  147     BMP   Results from last 7 days   Lab Units 01/31/23  0305   SODIUM mmol/L 136   POTASSIUM mmol/L 3.7   CHLORIDE mmol/L 105   CO2 mmol/L 22.5   BUN mg/dL 37*   CREATININE mg/dL 1.07*   GLUCOSE mg/dL 122*     Cr Clearance Estimated Creatinine Clearance: 38.2 mL/min (A) (by C-G formula based on SCr of 1.07 mg/dL (H)).  Coag   Results from last 7 days   Lab Units 01/31/23  0305   INR  1.64*     HbA1C   Lab Results   Component Value Date    HGBA1C 5.70 (H) 12/16/2022    HGBA1C 5.40 12/14/2021    HGBA1C 5.4 01/13/2020     Blood Glucose No results found for: POCGLU  Infection     CMP   Results from last 7 days   Lab Units 01/31/23  0305 01/30/23  1343   SODIUM mmol/L 136  --    POTASSIUM mmol/L 3.7  --    CHLORIDE mmol/L 105  --    CO2 mmol/L 22.5  --    BUN mg/dL 37*  --    CREATININE mg/dL 1.07*  --    GLUCOSE mg/dL 122*  --    LIPASE U/L  --  52*     ABG      Radiology(recent) CT Abdomen Pelvis With Contrast    Result Date: 1/30/2023  1. Abnormal appearance of the mid abdominal aorta with contained rupture/pseudoaneurysm arising from the left posterolateral aspect with measurements as above. No evidence of active bleeding. 2. No evidence of aortic dissection. 3. Fluid-filled large and small bowel which may be due to ileus or enterocolitis.    This study was performed using dose reduction  techniques to achieve radiation exposure as low as reasonably achievable (ALARA).      Assessment & Plan     Assessment & Plan      Rectal bleed    Pseudoaneurysm of left femoral artery (HCC)    CAD (coronary artery disease)    Paroxysmal A-fib (HCC)    HLD (hyperlipidemia)    Peripheral vascular disease (HCC)    Hypothyroidism    Anemia    Dementia with behavioral disturbance    Essential hypertension    Leukocytosis    Hematuria    Ileus (HCC)    Rectal bleeding    AAA (abdominal aortic aneurysm)      88 y.o. female with stable mycotic aneurysm controlled with antibiotics.  This is not part of her current constellation of symptoms of GI bleed and gastroenteritis.  No evidence of aortoenteric fistula.  Currently she is very stable from our standpoint.  We will sign off.  She is to follow-up in the office.  I did discuss with her daughter that if they have decided not to do anything regardless and may be canceling her office follow-up appointments would be best as it seems unlikely we will have any recommendations for intervention even if things got worse.  They will think this over and make a decision and call our office if they do not wish to follow-up anymore.  Otherwise we will see her back in the office for routine follow-up.  Please call for issues      Randy Ott MD  02/01/23  07:39 EST    Please call my office with any question: [phone]    Active Hospital Problems    Diagnosis  POA   • **Rectal bleed [K62.5]  Yes   • Leukocytosis [D72.829]  Yes   • Hematuria [R31.9]  Yes   • Ileus (HCC) [K56.7]  Yes   • Rectal bleeding [K62.5]  Yes   • AAA (abdominal aortic aneurysm) [I71.40]  Yes   • Essential hypertension [I10]  Yes   • Dementia with behavioral disturbance [F03.918]  Yes   • CAD (coronary artery disease) [I25.10]  Yes   • Paroxysmal A-fib (HCC) [I48.0]  Yes   • HLD (hyperlipidemia) [E78.5]  Yes   • Peripheral vascular disease (HCC) [I73.9]  Yes   • Hypothyroidism [E03.9]  Yes   • Anemia  [D64.9]  Yes   • 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

## 2023-02-01 NOTE — PLAN OF CARE
Problem: Adult Inpatient Plan of Care  Goal: Plan of Care Review  Outcome: Ongoing, Progressing  Flowsheets (Taken 2/1/2023 0049)  Progress: no change  Plan of Care Reviewed With: patient  Outcome Evaluation: Pt resting w eyes closed most of shift.  awakens easily but drifts back off to sleep quickly.  no distress noted.  family at bedside   Goal Outcome Evaluation:  Plan of Care Reviewed With: patient        Progress: no change  Outcome Evaluation: Pt resting w eyes closed most of shift.  awakens easily but drifts back off to sleep quickly.  no distress noted.  family at bedside

## 2023-02-01 NOTE — PROGRESS NOTES
Name: Becky Zavala ADMIT: 2023   : 1934  PCP: Surjit Gutierrez MD    MRN: [id number] LOS: 0 days   AGE/SEX: 88 y.o. female  ROOM: [facility]     Subjective   Subjective   Feeling okay today. Denies N/V/D/abd pain. No bleeding. Hasn't eaten anything yet today as she has been asleep since pm. Denies SOA or CP. Voiding well. No behavioral issues.    Review of Systems   as above    Objective   Objective   Vital Signs  Temp:  [97.7 °F (36.5 °C)-99.7 °F (37.6 °C)] 99.4 °F (37.4 °C)  Heart Rate:  [67-86] 67  Resp:  [16-18] 16  BP: (143-156)/(79-93) 156/84  SpO2:  [91 %-99 %] 91 %  on   ;   Device (Oxygen Therapy): room air  Body mass index is 22.99 kg/m².  Physical Exam   Alert in NAD, chronically ill-appearing, non-toxic  HEENT: unremarkable, edentulous  Neck: supple  Lungs: CTAB with good air mvmt  CV: RRR per auscultation, pulses intact  Abd: soft ND, +BS, mild RLQ TTP w/o R/G  Extr: WWP, no edema, BCR<2sec  Skin: warm and dry, no rash  Neuro: oriented to person only, no other focal deficits  Psych: very pleasant and appropriate    Results Review     I reviewed the patient's new clinical results.  Results from last 7 days   Lab Units 23  0811 23  0009 23  1633 23  0942 23  0305   WBC 10*3/mm3 8.32  --   --   --  10.01   HEMOGLOBIN g/dL 11.9* 11.5* 12.6 11.6* 11.5*   PLATELETS 10*3/mm3 174  --   --   --  147     Results from last 7 days   Lab Units 23  0811 23  0305   SODIUM mmol/L 144 136   POTASSIUM mmol/L 4.0 3.7   CHLORIDE mmol/L 110* 105   CO2 mmol/L 22.8 22.5   BUN mg/dL 36* 37*   CREATININE mg/dL 0.73 1.07*   GLUCOSE mg/dL 110* 122*   EGFR mL/min/1.73 79.2 50.1*     Results from last 7 days   Lab Units 23  0811   ALBUMIN g/dL 3.3*   BILIRUBIN mg/dL 0.5   ALK PHOS U/L 76   AST (SGOT) U/L 23   ALT (SGPT) U/L 14     Results from last 7 days   Lab Units 23  0811 23  0305   CALCIUM mg/dL 8.4* 8.2*   ALBUMIN g/dL 3.3*  --    MAGNESIUM  mg/dL 1.8  --      Results from last 7 days   Lab Units 02/01/23  0811 01/31/23  0942   LACTATE mmol/L 0.7 0.8     No results found for: HGBA1C, POCGLU    CT Abdomen Pelvis With Contrast    Result Date: 1/30/2023  1. Abnormal appearance of the mid abdominal aorta with contained rupture/pseudoaneurysm arising from the left posterolateral aspect with measurements as above. No evidence of active bleeding. 2. No evidence of aortic dissection. 3. Fluid-filled large and small bowel which may be due to ileus or enterocolitis.    This study was performed using dose reduction techniques to achieve radiation exposure as low as reasonably achievable (ALARA).    I have personally reviewed all medications:  Scheduled Medications  dilTIAZem CD, 120 mg, Oral, Daily  doxycycline, 100 mg, Oral, Q12H  levothyroxine, 175 mcg, Oral, Daily  potassium chloride, 20 mEq, Oral, Daily  pravastatin, 80 mg, Oral, Nightly  sertraline, 100 mg, Oral, Daily  sodium chloride, 10 mL, Intravenous, Q12H  timolol, 1 drop, Both Eyes, BID    Infusions   Diet  Diet: Liquid Diets; Full Liquid; Texture: Regular Texture (IDDSI 7); Fluid Consistency: Thin (IDDSI 0)    I have personally reviewed:  [x]  Laboratory   []  Microbiology   [x]  Radiology   [x]  EKG/Telemetry  []  Cardiology/Vascular   []  Pathology    []  Records       Assessment/Plan     Active Hospital Problems    Diagnosis  POA   • **Rectal bleed [K62.5]  Yes   • Leukocytosis [D72.829]  Yes   • Hematuria [R31.9]  Yes   • Ileus (HCC) [K56.7]  Yes   • Rectal bleeding [K62.5]  Yes   • AAA (abdominal aortic aneurysm) [I71.40]  Yes   • Essential hypertension [I10]  Yes   • Dementia with behavioral disturbance [F03.918]  Yes   • CAD (coronary artery disease) [I25.10]  Yes   • Paroxysmal A-fib (HCC) [I48.0]  Yes   • HLD (hyperlipidemia) [E78.5]  Yes   • Peripheral vascular disease (HCC) [I73.9]  Yes   • Hypothyroidism [E03.9]  Yes   • Anemia [D64.9]  Yes   • Pseudoaneurysm of left femoral artery (HCC)  "[I72.4]  Yes      Resolved Hospital Problems   No resolved problems to display.       87yo woman with h/o Alzheimer's dementia, AFib (Pradaxa), CAD, HLD, HTN, hypoT4, and chronic mycotic (MSSA) aortic pseudoaneurysm followed by Dr. Ott ([facility]), who presented to outside ER with report of rectal bleeding and possible hematuria. There was also concern for \"contained rupture\" of known AAA. Vasc Surg ([facility]) agreed to see pt in consultation prior to transfer. She was given dose of Zosyn IV at outside ER bc her WBC was 15, lactate was 2, and CTA A/P showed ileus vs enterocolitis.    GI Bleed  Enterocolitis vs ileus  Anemia: GI consulted, appreciate their attention to pt, continue to hold AC and monitor serial Hgb, FLD okay per GI, consider tagged RBC scan if Hgb drops, Hgb stable this AM around 11, initial drop may have been dilutional, will need to restart AC eventually--ideally after C/S has been done--and watch for recurrence of bleeding, await GI recs     Leukocytosis: suspect reactive, normalized immediately on admission, remains afebrile, LA wnl, continue to monitor, hold off on any further IV abx     Elevated lactate: reported at outside facility, normalized after IVFs, have stopped IVFs now that she's tolerating diet    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: appreciate ID and Vasc Surg attention to pt, serial Hgb stable, abd exam fairly benign  Vasc Surg does not feel there is any rupture present  Continue chronic oral Doxycycline suppressive therapy per ID and f/u with Vasc Surg as outpt     PAF: HRs controlled on Diltiazem, AC on hold for rectal bleeding     HTN: BPs acceptable/robust on Diltiazem     HypoT4: continue L-T4     Alzhemier's Dementia: stable, watch for behavioral disturbance, no longer taking Risperdal due to side effects, family treats sundowning with CBD gummies each evening, doing well here     Elevated Cr: Cr normalized, off IVFs      · SCDs for DVT prophylaxis while AC " on hold.  · DNR confirmed (paperwork reviewed).  · Discussed with patient, dtr, and RN.  · Anticipate discharge TBD        Haile Dubose MD  [facility]  02/01/23  09:54 EST

## 2023-02-01 NOTE — CASE MANAGEMENT/SOCIAL WORK
Discharge Planning Assessment  [facility]     Patient Name: Becky Zavala  MRN: [id number]  Today's Date: 2/1/2023    Admit Date: 1/30/2023    Plan: Home with family   Discharge Needs Assessment     Row Name 02/01/23 1125       Living Environment    People in Home alone    Current Living Arrangements home    Potentially Unsafe Housing Conditions none    Primary Care Provided by child(seth)    Provides Primary Care For no one    Family Caregiver if Needed child(seth), adult    Quality of Family Relationships involved;helpful;supportive    Able to Return to Prior Arrangements yes       Resource/Environmental Concerns    Resource/Environmental Concerns none       Transition Planning    Patient/Family Anticipates Transition to home with family    Patient/Family Anticipated Services at Transition none    Transportation Anticipated family or friend will provide       Discharge Needs Assessment    Readmission Within the Last 30 Days no previous admission in last 30 days    Equipment Currently Used at Home walker, rolling;commode;grab bar;wheelchair    Concerns to be Addressed no discharge needs identified;denies needs/concerns at this time    Anticipated Changes Related to Illness none    Equipment Needed After Discharge none               Discharge Plan     Row Name 02/01/23 1126       Plan    Plan Home with family    Plan Comments CCP met with patient and patient’s daughterCatrina at bedside. CCP role explained and discharge planning discussed. Face sheet verified and MOYA noted. Patient’s PCP is Dr. Gutierrez. Patient lives alone but her daughter states a family member or hired caregiver is always with her. Patient has grab bars present in the bathroom. Patient has a wheelchair, rolling walker and BSC. Patient has used [facility] in the past and denies any SNF. Patient’s daughter anticipates returning home and does not anticipate needing HH/SNF at discharge. Per notes; ID signed off. CCP will follow for discharge home.  Aimee WILKINSON              Continued Care and Services - Admitted Since 1/30/2023    Coordination has not been started for this encounter.       Expected Discharge Date and Time     Expected Discharge Date Expected Discharge Time    Feb 2, 2023          Demographic Summary     Row Name 02/01/23 1125       General Information    Admission Type observation    Arrived From emergency department    Required Notices Provided Observation Status Notice    Referral Source admission list    Reason for Consult discharge planning    Preferred Language English               Functional Status     Row Name 02/01/23 1125       Functional Status    Usual Activity Tolerance good    Current Activity Tolerance good       Functional Status, IADL    Medications assistive equipment and person    Meal Preparation assistive equipment and person    Housekeeping assistive equipment and person    Laundry assistive equipment and person    Shopping assistive equipment and person       Mental Status Summary    Recent Changes in Mental Status/Cognitive Functioning no changes               Psychosocial    No documentation.                Abuse/Neglect    No documentation.                Legal    No documentation.                Substance Abuse    No documentation.                Patient Forms    No documentation.                   MINH Olmedo

## 2023-02-02 LAB
ANION GAP SERPL CALCULATED.3IONS-SCNC: 7.6 MMOL/L (ref 5–15)
BUN SERPL-MCNC: 35 MG/DL (ref 8–23)
BUN/CREAT SERPL: 48.6 (ref 7–25)
CALCIUM SPEC-SCNC: 8.4 MG/DL (ref 8.6–10.5)
CHLORIDE SERPL-SCNC: 107 MMOL/L (ref 98–107)
CO2 SERPL-SCNC: 22.4 MMOL/L (ref 22–29)
CREAT SERPL-MCNC: 0.72 MG/DL (ref 0.57–1)
EGFRCR SERPLBLD CKD-EPI 2021: 80.5 ML/MIN/1.73
GLUCOSE SERPL-MCNC: 103 MG/DL (ref 65–99)
HCT VFR BLD AUTO: 34.8 % (ref 34–46.6)
HCT VFR BLD AUTO: 35.3 % (ref 34–46.6)
HCT VFR BLD AUTO: 40.7 % (ref 34–46.6)
HGB BLD-MCNC: 11.2 G/DL (ref 12–15.9)
HGB BLD-MCNC: 11.6 G/DL (ref 12–15.9)
HGB BLD-MCNC: 13.3 G/DL (ref 12–15.9)
MAGNESIUM SERPL-MCNC: 1.7 MG/DL (ref 1.6–2.4)
POTASSIUM SERPL-SCNC: 3.6 MMOL/L (ref 3.5–5.2)
SODIUM SERPL-SCNC: 137 MMOL/L (ref 136–145)

## 2023-02-02 PROCEDURE — 80048 BASIC METABOLIC PNL TOTAL CA: CPT | Performed by: HOSPITALIST

## 2023-02-02 PROCEDURE — 83735 ASSAY OF MAGNESIUM: CPT | Performed by: HOSPITALIST

## 2023-02-02 PROCEDURE — 85014 HEMATOCRIT: CPT | Performed by: NURSE PRACTITIONER

## 2023-02-02 PROCEDURE — 99214 OFFICE O/P EST MOD 30 MIN: CPT | Performed by: INTERNAL MEDICINE

## 2023-02-02 PROCEDURE — 85018 HEMOGLOBIN: CPT | Performed by: NURSE PRACTITIONER

## 2023-02-02 PROCEDURE — G0378 HOSPITAL OBSERVATION PER HR: HCPCS

## 2023-02-02 RX ORDER — DABIGATRAN ETEXILATE 150 MG/1
150 CAPSULE ORAL EVERY 12 HOURS SCHEDULED
Status: DISCONTINUED | OUTPATIENT
Start: 2023-02-02 | End: 2023-02-03 | Stop reason: HOSPADM

## 2023-02-02 RX ADMIN — DABIGATRAN ETEXILATE MESYLATE 150 MG: 150 CAPSULE ORAL at 20:38

## 2023-02-02 RX ADMIN — DOXYCYCLINE 100 MG: 100 CAPSULE ORAL at 20:38

## 2023-02-02 RX ADMIN — TIMOLOL MALEATE 1 DROP: 5 SOLUTION/ DROPS OPHTHALMIC at 20:38

## 2023-02-02 RX ADMIN — PRAVASTATIN SODIUM 80 MG: 40 TABLET ORAL at 20:38

## 2023-02-02 RX ADMIN — DILTIAZEM HYDROCHLORIDE 120 MG: 120 CAPSULE, COATED, EXTENDED RELEASE ORAL at 09:37

## 2023-02-02 RX ADMIN — TIMOLOL MALEATE 1 DROP: 5 SOLUTION/ DROPS OPHTHALMIC at 09:38

## 2023-02-02 RX ADMIN — SERTRALINE 100 MG: 100 TABLET, FILM COATED ORAL at 09:38

## 2023-02-02 RX ADMIN — POTASSIUM CHLORIDE 20 MEQ: 750 TABLET, EXTENDED RELEASE ORAL at 09:38

## 2023-02-02 RX ADMIN — LEVOTHYROXINE SODIUM 175 MCG: 0.17 TABLET ORAL at 06:00

## 2023-02-02 RX ADMIN — DOXYCYCLINE 100 MG: 100 CAPSULE ORAL at 09:38

## 2023-02-02 RX ADMIN — Medication 10 ML: at 20:38

## 2023-02-02 RX ADMIN — Medication 10 ML: at 09:38

## 2023-02-02 RX ADMIN — DABIGATRAN ETEXILATE MESYLATE 150 MG: 150 CAPSULE ORAL at 12:22

## 2023-02-02 NOTE — PROGRESS NOTES
Name: Becky Zavala ADMIT: 2023   : 1934  PCP: Surjit Gutierrez MD    MRN: [id number] LOS: 0 days   AGE/SEX: 88 y.o. female  ROOM: [facility]     Subjective   Subjective   Feeling okay again today. Denies N/V/D/abd pain. No bleeding. Tolerating FLD. Denies SOA or CP. Voiding well. No behavioral issues.    Review of Systems     as above    Objective   Objective   Vital Signs  Temp:  [97.8 °F (36.6 °C)-98.2 °F (36.8 °C)] 98.2 °F (36.8 °C)  Heart Rate:  [64-71] 70  Resp:  [16] 16  BP: (116-161)/(64-90) 116/64  SpO2:  [95 %-100 %] 95 %  on   ;   Device (Oxygen Therapy): room air  Body mass index is 23.06 kg/m².  Physical Exam  Vitals and nursing note reviewed. Exam conducted with a chaperone present (Grand-dtr).   Constitutional:       General: She is not in acute distress.     Appearance: She is ill-appearing (chronically). She is not toxic-appearing or diaphoretic.   HENT:      Head: Normocephalic.      Mouth/Throat:      Mouth: Mucous membranes are moist.      Pharynx: Oropharynx is clear.   Eyes:      General: No scleral icterus.        Right eye: No discharge.         Left eye: No discharge.      Extraocular Movements: Extraocular movements intact.      Conjunctiva/sclera: Conjunctivae normal.   Cardiovascular:      Rate and Rhythm: Normal rate and regular rhythm.      Pulses: Normal pulses.   Pulmonary:      Effort: Pulmonary effort is normal. No respiratory distress.      Breath sounds: Normal breath sounds. No wheezing or rales.   Abdominal:      General: Bowel sounds are normal. There is no distension.      Palpations: Abdomen is soft.      Tenderness: There is no abdominal tenderness.   Musculoskeletal:         General: Swelling (trace in BLEs) present. Normal range of motion.      Cervical back: Neck supple. No rigidity.   Lymphadenopathy:      Cervical: No cervical adenopathy.   Skin:     General: Skin is warm and dry.      Capillary Refill: Capillary refill takes less than 2 seconds.       Coloration: Skin is not jaundiced.   Neurological:      Mental Status: She is alert. Mental status is at baseline.      Cranial Nerves: No cranial nerve deficit.      Coordination: Coordination normal.      Comments: Oriented to person   Psychiatric:         Mood and Affect: Mood normal.         Behavior: Behavior normal.      Comments: Very pleasant       Results Review     I reviewed the patient's new clinical results.  Results from last 7 days   Lab Units 02/02/23  0558 02/02/23  0012 02/01/23  1555 02/01/23  0811 01/31/23  0942 01/31/23  0305   WBC 10*3/mm3  --   --   --  8.32  --  10.01   HEMOGLOBIN g/dL 11.2* 11.6* 12.2 11.9*   < > 11.5*   PLATELETS 10*3/mm3  --   --   --  174  --  147    < > = values in this interval not displayed.     Results from last 7 days   Lab Units 02/02/23  0558 02/01/23  0811 01/31/23  0305   SODIUM mmol/L 137 144 136   POTASSIUM mmol/L 3.6 4.0 3.7   CHLORIDE mmol/L 107 110* 105   CO2 mmol/L 22.4 22.8 22.5   BUN mg/dL 35* 36* 37*   CREATININE mg/dL 0.72 0.73 1.07*   GLUCOSE mg/dL 103* 110* 122*   EGFR mL/min/1.73 80.5 79.2 50.1*     Results from last 7 days   Lab Units 02/01/23  0811   ALBUMIN g/dL 3.3*   BILIRUBIN mg/dL 0.5   ALK PHOS U/L 76   AST (SGOT) U/L 23   ALT (SGPT) U/L 14     Results from last 7 days   Lab Units 02/02/23  0558 02/01/23  0811 01/31/23  0305   CALCIUM mg/dL 8.4* 8.4* 8.2*   ALBUMIN g/dL  --  3.3*  --    MAGNESIUM mg/dL 1.7 1.8  --      Results from last 7 days   Lab Units 02/01/23  0811 01/31/23  0942   LACTATE mmol/L 0.7 0.8     No results found for: HGBA1C, POCGLU    No radiology results for the last day  I have personally reviewed all medications:  Scheduled Medications  dabigatran etexilate, 150 mg, Oral, Q12H  dilTIAZem CD, 120 mg, Oral, Daily  doxycycline, 100 mg, Oral, Q12H  levothyroxine, 175 mcg, Oral, Daily  potassium chloride, 20 mEq, Oral, Daily  pravastatin, 80 mg, Oral, Nightly  sertraline, 100 mg, Oral, Daily  sodium chloride, 10 mL,  "Intravenous, Q12H  timolol, 1 drop, Both Eyes, BID    Infusions   Diet  Diet: Liquid Diets; Full Liquid; Texture: Regular Texture (IDDSI 7); Fluid Consistency: Thin (IDDSI 0)    I have personally reviewed:  [x]  Laboratory   []  Microbiology   []  Radiology   [x]  EKG/Telemetry  []  Cardiology/Vascular   []  Pathology    []  Records       Assessment/Plan     Active Hospital Problems    Diagnosis  POA   • **Rectal bleed [K62.5]  Yes   • Leukocytosis [D72.829]  Yes   • Hematuria [R31.9]  Yes   • Ileus (HCC) [K56.7]  Yes   • Rectal bleeding [K62.5]  Yes   • AAA (abdominal aortic aneurysm) [I71.40]  Yes   • Essential hypertension [I10]  Yes   • Dementia with behavioral disturbance [F03.918]  Yes   • CAD (coronary artery disease) [I25.10]  Yes   • Paroxysmal A-fib (HCC) [I48.0]  Yes   • HLD (hyperlipidemia) [E78.5]  Yes   • Peripheral vascular disease (HCC) [I73.9]  Yes   • Hypothyroidism [E03.9]  Yes   • Anemia [D64.9]  Yes   • 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       87yo woman with h/o Alzheimer's dementia, AFib (Pradaxa), CAD, HLD, HTN, hypoT4, and chronic mycotic (MSSA) aortic pseudoaneurysm followed by Dr. Ott ([facility]), who presented to outside ER with report of rectal bleeding and possible hematuria. There was also concern for \"contained rupture\" of known AAA. Vas Surg ([facility]) agreed to see pt in consultation prior to transfer. She was given dose of Zosyn IV at outside ER bc her WBC was 15, lactate was 2, and CTA A/P showed ileus vs enterocolitis.    GI Bleed  Enterocolitis vs ileus  Anemia: appreciate GI attention to pt, after d/w pt and family decision was made to hold off on endoscopy as Hgb has remained stable and no further evidence of bleeding, Hgb remains stable this AM around 11 (initial drop may have been dilutional), okay to restart AC today per GI, have re-ordered her Pradaxa, monitor for bleeding or drop in " Hgb  Advance to regular diet      Leukocytosis: suspect reactive, normalized immediately on admission, remains afebrile, LA wnl, continue to monitor, hold off on any further IV abx     Elevated lactate: reported at outside facility, normalized after IVFs, have stopped IVFs now that she's tolerating diet    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: appreciate ID and Vasc Surg attention to pt, serial Hgb stable, abd exam fairly benign  Vasc Surg does not feel there is any rupture present  Continue chronic oral Doxycycline suppressive therapy per ID and f/u with Vasc Surg as outpt     PAF: HRs controlled on Diltiazem, AC has been on hold for rectal bleeding on admission, restart Pradaxa today     HTN: BPs acceptable on Diltiazem     HypoT4: continue L-T4     Alzhemier's Dementia: stable, watch for behavioral disturbance, no longer taking Risperdal due to side effects, family treats sundowning with CBD gummies each evening, doing well here     Elevated Cr: Cr normalized, off IVFs      · SCDs for DVT prophylaxis while AC on hold.  · DNR confirmed (paperwork reviewed).  · Discussed with patient, grand-dtr, and RN.  · Anticipate discharge home with family tomorrow if Hgb stable      Haile Dubose MD  [facility]  02/02/23  13:53 EST

## 2023-02-02 NOTE — PROGRESS NOTES
[facility]  Inpatient Progress Note    Reason for Followup:  Hematochezia    Subjective     Interval History:   No new issues, no BM overnight    Current Facility-Administered Medications:   •  acetaminophen (TYLENOL) tablet 650 mg, 650 mg, Oral, Q4H PRN **OR** acetaminophen (TYLENOL) 160 MG/5ML solution 650 mg, 650 mg, Oral, Q4H PRN **OR** acetaminophen (TYLENOL) suppository 650 mg, 650 mg, Rectal, Q4H PRN, Opal Everett APRN  •  dilTIAZem CD (CARDIZEM CD) 24 hr capsule 120 mg, 120 mg, Oral, Daily, Opal Everett, APRN, 120 mg at 02/01/23 1035  •  doxycycline (MONODOX) capsule 100 mg, 100 mg, Oral, Q12H, Roque Bangura MD, 100 mg at 02/01/23 2035  •  hydrOXYzine (ATARAX) tablet 25 mg, 25 mg, Oral, TID PRN, Haile Dubose MD, 25 mg at 01/31/23 1640  •  levothyroxine (SYNTHROID, LEVOTHROID) tablet 175 mcg, 175 mcg, Oral, Daily, Opal Everett, APRN, 175 mcg at 02/02/23 0600  •  melatonin tablet 10 mg, 10 mg, Oral, Nightly PRN, Opal Everett, APRN, 10 mg at 01/31/23 0234  •  nitroglycerin (NITROSTAT) SL tablet 0.4 mg, 0.4 mg, Sublingual, Q5 Min PRN, Randy Morris MD  •  ondansetron (ZOFRAN) injection 4 mg, 4 mg, Intravenous, Q6H PRN, Opal Everett, APRN, 4 mg at 01/31/23 1047  •  potassium chloride (K-DUR,KLOR-CON) ER tablet 20 mEq, 20 mEq, Oral, Daily, Opal Everett APRN, 20 mEq at 02/01/23 1035  •  pravastatin (PRAVACHOL) tablet 80 mg, 80 mg, Oral, Nightly, Opal Everett APRN, 80 mg at 02/01/23 2035  •  sertraline (ZOLOFT) tablet 100 mg, 100 mg, Oral, Daily, Opal Everett APRN, 100 mg at 02/01/23 1035  •  sodium chloride 0.9 % flush 10 mL, 10 mL, Intravenous, Q12H, Opal Everett APRN, 10 mL at 02/01/23 2035  •  sodium chloride 0.9 % flush 10 mL, 10 mL, Intravenous, PRN, Opal Everett APRN  •  sodium chloride 0.9 % infusion 40 mL, 40 mL, Intravenous, PRN, Opal Everett APRN  •  timolol (TIMOPTIC)  0.5 % ophthalmic solution 1 drop, 1 drop, Both Eyes, BID, Opal Everett, APRN, 1 drop at 02/01/23 2035    Objective     Vital Signs  Temp:  [97.8 °F (36.6 °C)-98.4 °F (36.9 °C)] 98.2 °F (36.8 °C)  Heart Rate:  [64-71] 64  Resp:  [16] 16  BP: (116-161)/(64-90) 116/64  Body mass index is 23.06 kg/m².  No intake or output data in the 24 hours ending 02/02/23 0816  No intake/output data recorded.     Physical Exam:   General: patient awake, alert and cooperative   Abdomen: soft, nontender, nondistended; normal bowel sounds     Results Review:     I reviewed the patient's new clinical results.    Results from last 7 days   Lab Units 02/02/23  0558 02/02/23  0012 02/01/23  1555 02/01/23  0811 01/31/23  0942 01/31/23  0305   WBC 10*3/mm3  --   --   --  8.32  --  10.01   HEMOGLOBIN g/dL 11.2* 11.6* 12.2 11.9*   < > 11.5*   HEMATOCRIT % 34.8 35.3 37.6 35.9   < > 34.0   PLATELETS 10*3/mm3  --   --   --  174  --  147    < > = values in this interval not displayed.     Results from last 7 days   Lab Units 02/02/23  0558 02/01/23  0811 01/31/23  0305   SODIUM mmol/L 137 144 136   POTASSIUM mmol/L 3.6 4.0 3.7   CHLORIDE mmol/L 107 110* 105   CO2 mmol/L 22.4 22.8 22.5   BUN mg/dL 35* 36* 37*   CREATININE mg/dL 0.72 0.73 1.07*   CALCIUM mg/dL 8.4* 8.4* 8.2*   BILIRUBIN mg/dL  --  0.5  --    ALK PHOS U/L  --  76  --    ALT (SGPT) U/L  --  14  --    AST (SGOT) U/L  --  23  --    GLUCOSE mg/dL 103* 110* 122*     Results from last 7 days   Lab Units 01/31/23  0305   INR  1.64*     Lab Results   Lab Value Date/Time    LIPASE 30 02/01/2023 0811    LIPASE 52 (L) 01/30/2023 1343       Radiology:  [unfilled]      Assessment & Plan   Assessment:   1. Rectal bleeding: Reported is bright red blood per rectum which would suggest a lower GI tract source.  Possibilities would include hemorrhoids, diverticular source, or some degree of colitis.  2. Ileus versus enterocolitis (noted on OSH images)  3. Anticoagulant therapy secondary to  atrial fibrillation  4. Dementia    All problems new to me today    Plan:   Hb remains stable, no e/o overt bleeding  AC has been resumed.  Family has decided to defer endoscopy at this time.  GI will see again as needed    I discussed the patient's findings and my recommendations with patient.          Max Harper M.D.  [facility]  [address] [place] [address]  Office: [phone]

## 2023-02-02 NOTE — PROGRESS NOTES
[facility]  Inpatient Progress Note    Reason for Followup:  hematochezia    Subjective:  Kaveh Segundo per RN.  Patient feels well.  No abd pain, n/v.     Current Facility-Administered Medications:   •  acetaminophen (TYLENOL) tablet 650 mg, 650 mg, Oral, Q4H PRN **OR** acetaminophen (TYLENOL) 160 MG/5ML solution 650 mg, 650 mg, Oral, Q4H PRN **OR** acetaminophen (TYLENOL) suppository 650 mg, 650 mg, Rectal, Q4H PRN, Opal Everett, APRN  •  dilTIAZem CD (CARDIZEM CD) 24 hr capsule 120 mg, 120 mg, Oral, Daily, Opal Everett APRN, 120 mg at 02/01/23 1035  •  doxycycline (MONODOX) capsule 100 mg, 100 mg, Oral, Q12H, Roque Bangura MD, 100 mg at 02/01/23 2035  •  hydrOXYzine (ATARAX) tablet 25 mg, 25 mg, Oral, TID PRN, Haile Dubose MD, 25 mg at 01/31/23 1640  •  levothyroxine (SYNTHROID, LEVOTHROID) tablet 175 mcg, 175 mcg, Oral, Daily, Opal Everett APRN, 175 mcg at 02/01/23 0619  •  melatonin tablet 10 mg, 10 mg, Oral, Nightly PRN, Opal Everett APRN, 10 mg at 01/31/23 0234  •  nitroglycerin (NITROSTAT) SL tablet 0.4 mg, 0.4 mg, Sublingual, Q5 Min PRN, Ranyd Morris MD  •  ondansetron (ZOFRAN) injection 4 mg, 4 mg, Intravenous, Q6H PRN, Opal Everett, APRN, 4 mg at 01/31/23 1047  •  potassium chloride (K-DUR,KLOR-CON) ER tablet 20 mEq, 20 mEq, Oral, Daily, Opal Everett APRN, 20 mEq at 02/01/23 1035  •  pravastatin (PRAVACHOL) tablet 80 mg, 80 mg, Oral, Nightly, Opal Everett APRN, 80 mg at 02/01/23 2035  •  sertraline (ZOLOFT) tablet 100 mg, 100 mg, Oral, Daily, Opal Everett APRN, 100 mg at 02/01/23 1035  •  sodium chloride 0.9 % flush 10 mL, 10 mL, Intravenous, Q12H, Opal Everett APRN, 10 mL at 02/01/23 2035  •  sodium chloride 0.9 % flush 10 mL, 10 mL, Intravenous, PRN, Opal Everett APRN  •  sodium chloride 0.9 % infusion 40 mL, 40 mL, Intravenous, PRN, Opal Everett APRN  •  timolol  (TIMOPTIC) 0.5 % ophthalmic solution 1 drop, 1 drop, Both Eyes, BID, Opal Everett, APRN, 1 drop at 02/01/23 2035  Review of Systems:   Gen: No fever or chills  GI: No abd pain, nausea, vomiting      Objective     Vital Signs  Temp:  [97.8 °F (36.6 °C)-99.4 °F (37.4 °C)] 97.8 °F (36.6 °C)  Heart Rate:  [67] 67  Resp:  [16] 16  BP: (147-156)/(84-87) 147/84  Body mass index is 23.06 kg/m².  No intake or output data in the 24 hours ending 02/01/23 2102  No intake/output data recorded.     Physical Exam:   General: patient awake, alert and cooperative   Eyes: Normal lids and lashes, no scleral icterus   Neck: supple, normal ROM   Skin: warm and dry, not jaundiced   Cardiovascular: regular rate, well-perfused extremities   Pulm: Equal expansion bilaterally, no increased WOB   Abdomen: soft, nontender, nondistended;    Extremities: no rash or edema              Neuro: Awake, interactive, diminished hearing   Psychiatric: Normal mood and behavior;     Results Review:     I reviewed the patient's new clinical results.    Results from last 7 days   Lab Units 02/01/23  1555 02/01/23  0811 02/01/23  0009 01/31/23  0942 01/31/23  0305   WBC 10*3/mm3  --  8.32  --   --  10.01   HEMOGLOBIN g/dL 12.2 11.9* 11.5*   < > 11.5*   HEMATOCRIT % 37.6 35.9 34.6   < > 34.0   PLATELETS 10*3/mm3  --  174  --   --  147    < > = values in this interval not displayed.     Results from last 7 days   Lab Units 02/01/23  0811 01/31/23  0305   SODIUM mmol/L 144 136   POTASSIUM mmol/L 4.0 3.7   CHLORIDE mmol/L 110* 105   CO2 mmol/L 22.8 22.5   BUN mg/dL 36* 37*   CREATININE mg/dL 0.73 1.07*   CALCIUM mg/dL 8.4* 8.2*   BILIRUBIN mg/dL 0.5  --    ALK PHOS U/L 76  --    ALT (SGPT) U/L 14  --    AST (SGOT) U/L 23  --    GLUCOSE mg/dL 110* 122*     Results from last 7 days   Lab Units 01/31/23  0305   INR  1.64*     Lab Results   Lab Value Date/Time    LIPASE 30 02/01/2023 0811    LIPASE 52 (L) 01/30/2023 1343        Radiology:  [unfilled]      Assessment & Plan   Assessment:   1. Rectal bleeding: Reported is bright red blood per rectum which would suggest a lower GI tract source.  Possibilities would include hemorrhoids, diverticular source, or some degree of colitis.  2. Ileus versus enterocolitis (noted on OSH images)  3. Anticoagulant therapy secondary to atrial fibrillation  4. Dementia      Plan:   - history of bleeding in the past with work-up in June 2019 including upper endoscopy and colonoscopy.  Noted diverticulosis, hemorrhoids and small polyps which were removed.   - OSH imaging with findings of fluid filled bowel, bleeding reportedly followed diarrhea.  Kaveh ALVA per RN today.  She has no abd pain, n/v.  May have resolving gastroenteritis.   - Hb normal and stable  - I discussed risks/benefits of potential colonoscopy with patient and daughter at bedside.  After discussion and given her age and multiple comorbid conditions, patient and daughter would like to defer colonoscopy unless absolutely necessary to stop ongoing active bleeding.    - Okay to resume anticoagulation as indicated per primary team and monitor closely for signs/symptoms of bleeding    I discussed the patient's findings and my recommendations with patient, family and nursing staff.

## 2023-02-02 NOTE — PLAN OF CARE
Goal Outcome Evaluation:  Plan of Care Reviewed With: patient        Progress: no change  Outcome Evaluation: Last Hgb 11.6.  No BM so far this shift.  Family at bedside.  Pleasantly confused.  Bed alarm on for safety.  INC of urine.

## 2023-02-03 ENCOUNTER — READMISSION MANAGEMENT (OUTPATIENT)
Dept: CALL CENTER | Facility: HOSPITAL | Age: 88
End: 2023-02-03
Payer: MEDICARE

## 2023-02-03 VITALS
SYSTOLIC BLOOD PRESSURE: 161 MMHG | OXYGEN SATURATION: 96 % | HEART RATE: 73 BPM | TEMPERATURE: 98.2 F | WEIGHT: 147.25 LBS | BODY MASS INDEX: 23.11 KG/M2 | HEIGHT: 67 IN | DIASTOLIC BLOOD PRESSURE: 82 MMHG | RESPIRATION RATE: 18 BRPM

## 2023-02-03 LAB
ANION GAP SERPL CALCULATED.3IONS-SCNC: 8 MMOL/L (ref 5–15)
BUN SERPL-MCNC: 30 MG/DL (ref 8–23)
BUN/CREAT SERPL: 49.2 (ref 7–25)
CALCIUM SPEC-SCNC: 8.4 MG/DL (ref 8.6–10.5)
CHLORIDE SERPL-SCNC: 107 MMOL/L (ref 98–107)
CO2 SERPL-SCNC: 25 MMOL/L (ref 22–29)
CREAT SERPL-MCNC: 0.61 MG/DL (ref 0.57–1)
DEPRECATED RDW RBC AUTO: 40.8 FL (ref 37–54)
EGFRCR SERPLBLD CKD-EPI 2021: 86.1 ML/MIN/1.73
ERYTHROCYTE [DISTWIDTH] IN BLOOD BY AUTOMATED COUNT: 13.1 % (ref 12.3–15.4)
GLUCOSE SERPL-MCNC: 100 MG/DL (ref 65–99)
HCT VFR BLD AUTO: 34.3 % (ref 34–46.6)
HGB BLD-MCNC: 11.4 G/DL (ref 12–15.9)
MAGNESIUM SERPL-MCNC: 1.8 MG/DL (ref 1.6–2.4)
MCH RBC QN AUTO: 28.5 PG (ref 26.6–33)
MCHC RBC AUTO-ENTMCNC: 33.2 G/DL (ref 31.5–35.7)
MCV RBC AUTO: 85.8 FL (ref 79–97)
PLATELET # BLD AUTO: 173 10*3/MM3 (ref 140–450)
PMV BLD AUTO: 9.6 FL (ref 6–12)
POTASSIUM SERPL-SCNC: 3.7 MMOL/L (ref 3.5–5.2)
RBC # BLD AUTO: 4 10*6/MM3 (ref 3.77–5.28)
SODIUM SERPL-SCNC: 140 MMOL/L (ref 136–145)
WBC NRBC COR # BLD: 6.63 10*3/MM3 (ref 3.4–10.8)

## 2023-02-03 PROCEDURE — 80048 BASIC METABOLIC PNL TOTAL CA: CPT | Performed by: HOSPITALIST

## 2023-02-03 PROCEDURE — 83735 ASSAY OF MAGNESIUM: CPT | Performed by: HOSPITALIST

## 2023-02-03 PROCEDURE — 85027 COMPLETE CBC AUTOMATED: CPT | Performed by: HOSPITALIST

## 2023-02-03 PROCEDURE — G0378 HOSPITAL OBSERVATION PER HR: HCPCS

## 2023-02-03 RX ORDER — POTASSIUM CHLORIDE 20 MEQ/1
20 TABLET, EXTENDED RELEASE ORAL DAILY
Qty: 180 TABLET | Refills: 3
Start: 2023-02-03

## 2023-02-03 RX ADMIN — DOXYCYCLINE 100 MG: 100 CAPSULE ORAL at 09:31

## 2023-02-03 RX ADMIN — Medication 10 ML: at 09:32

## 2023-02-03 RX ADMIN — DABIGATRAN ETEXILATE MESYLATE 150 MG: 150 CAPSULE ORAL at 09:31

## 2023-02-03 RX ADMIN — TIMOLOL MALEATE 1 DROP: 5 SOLUTION/ DROPS OPHTHALMIC at 09:32

## 2023-02-03 RX ADMIN — DILTIAZEM HYDROCHLORIDE 120 MG: 120 CAPSULE, COATED, EXTENDED RELEASE ORAL at 09:31

## 2023-02-03 RX ADMIN — SERTRALINE 100 MG: 100 TABLET, FILM COATED ORAL at 09:31

## 2023-02-03 RX ADMIN — LEVOTHYROXINE SODIUM 175 MCG: 0.17 TABLET ORAL at 06:29

## 2023-02-03 RX ADMIN — POTASSIUM CHLORIDE 20 MEQ: 750 TABLET, EXTENDED RELEASE ORAL at 09:31

## 2023-02-03 NOTE — CASE MANAGEMENT/SOCIAL WORK
Case Management Discharge Note      Final Note: home no needs         Selected Continued Care - Discharged on 2/3/2023 Admission date: 1/30/2023 - Discharge disposition: Home or Self Care    Destination    No services have been selected for the patient.              Durable Medical Equipment    No services have been selected for the patient.              Dialysis/Infusion    No services have been selected for the patient.              Home Medical Care    No services have been selected for the patient.              Therapy    No services have been selected for the patient.              Community Resources    No services have been selected for the patient.              Community & DME    No services have been selected for the patient.                  Transportation Services  Private: Car    Final Discharge Disposition Code: 01 - home or self-care

## 2023-02-03 NOTE — OUTREACH NOTE
Prep Survey    Flowsheet Row Responses   [facility] patient discharged from? [place]   Is LACE score < 7 ? No   Eligibility [facility]   Date of Admission 01/30/23   Date of Discharge 02/03/23   Discharge Disposition Home or Self Care   Discharge diagnosis Rectal bleed,   Anemia   Does the patient have one of the following disease processes/diagnoses(primary or secondary)? Other   Does the patient have Home health ordered? No   Is there a DME ordered? No   Prep survey completed? Yes          ROBEL GARSIA - Registered Nurse

## 2023-02-03 NOTE — DISCHARGE SUMMARY
"    Patient Name: Becky Zavala  : 1934  MRN: [id number]    Date of Admission: 2023  Date of Discharge:  2/3/2023  Primary Care Physician: Surjit Gutierrez MD      Chief Complaint:   No chief complaint on file.      Discharge Diagnoses     Active Hospital Problems    Diagnosis  POA   • **Rectal bleed [K62.5]  Yes   • Leukocytosis [D72.829]  Yes   • Hematuria [R31.9]  Yes   • Ileus (HCC) [K56.7]  Yes   • Rectal bleeding [K62.5]  Yes   • AAA (abdominal aortic aneurysm) [I71.40]  Yes   • Essential hypertension [I10]  Yes   • Dementia with behavioral disturbance [F03.918]  Yes   • CAD (coronary artery disease) [I25.10]  Yes   • Paroxysmal A-fib (HCC) [I48.0]  Yes   • HLD (hyperlipidemia) [E78.5]  Yes   • Peripheral vascular disease (HCC) [I73.9]  Yes   • Hypothyroidism [E03.9]  Yes   • Anemia [D64.9]  Yes   • 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        Hospital Course     Very pleasant 89yo woman with h/o Alzheimer's dementia, AFib (Pradaxa), CAD, HLD, HTN, hypoT4, and chronic mycotic (MSSA) aortic pseudoaneurysm followed by Dr. Ott ([facility]), who presented to outside ER with report of rectal bleeding and possible hematuria. There was also concern for \"contained rupture\" of known AAA. [facility] Surg ([facility]) agreed to see pt in consultation prior to transfer. She was given dose of Zosyn IV at outside ER bc her WBC was 15, lactate was 2, and CTA A/P showed ileus vs enterocolitis. 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: appreciate ID and Vasc Surg attention to pt, serial Hgb stable, abd exam fairly benign  Vasc Surg does not feel there is any rupture present  Continue chronic oral Doxycycline suppressive therapy per ID and f/u with Vasc Surg as outpt     PAF: HRs controlled on Diltiazem, AC has been on hold for rectal bleeding on admission, restarted Pradaxa yesterday and seems to be tolerating just fine     HTN: BPs acceptable on Diltiazem     HypoT4: continue L-T4     Alzhemier's Dementia: stable, watch for behavioral disturbance, no longer taking Risperdal due to side effects, family treats sundowning with CBD gummies each evening, doing well here     Elevated Cr: Cr normalized, off IVFs        • SCDs for DVT prophylaxis while AC on hold.  • DNR confirmed (paperwork reviewed).  • Discussed with patient, dtr, and RN.  • Discharge home with family today.  • F/u with PCP in a week.    Day of Discharge     Subjective:  Feeling okay again today. Denies N/V/D/abd pain. No bleeding. Tolerating FLD. Denies SOA or CP. Voiding well. No behavioral issues. Very eager to go home.    Physical Exam:  Temp:  [97.7 °F (36.5 °C)-98.2 °F (36.8 °C)] 98.2 °F (36.8 °C)  Heart Rate:  [68-73] 73  Resp:  [16-18] 18  BP: (120-161)/(71-89) 161/82  Body mass index is 23.06 kg/m².  Physical Exam  Vitals and nursing note reviewed. Exam conducted with a chaperone present (Dtr).   Constitutional:       General: She is not in acute distress.     Appearance: She is ill-appearing (chronically). She is not toxic-appearing or diaphoretic.    Cardiovascular:      Rate and Rhythm: Normal rate and regular rhythm.      Pulses: Normal pulses.   Pulmonary:      Effort: Pulmonary effort is normal. No respiratory distress.      Breath sounds: Normal breath sounds. No  wheezing or rales.   Abdominal:      General: Bowel sounds are normal. There is no distension.      Palpations: Abdomen is soft.      Tenderness: There is no abdominal tenderness.   Musculoskeletal:         General: Swelling (trace in BLEs) present. Normal range of motion.   Skin:     General: Skin is warm and dry.      Capillary Refill: Capillary refill takes less than 2 seconds.      Coloration: Skin is not jaundiced.   Neurological:      Mental Status: She is alert. Mental status is at baseline.      Cranial Nerves: No cranial nerve deficit.      Coordination: Coordination normal.      Comments: Oriented to person   Psychiatric:         Mood and Affect: Mood normal.         Behavior: Behavior normal.      Comments: Very pleasant as always        Consultants     Consult Orders (all) (From admission, onward)     Start     Ordered    01/31/23 1059  Inpatient Infectious Diseases Consult  Once        Specialty:  Infectious Diseases  Provider:  Loi Shahid MD    01/31/23 1058    01/31/23 0825  Inpatient Gastroenterology Consult  Once        Specialty:  Gastroenterology  Provider:  Akila Quintanilla MD    01/31/23 0826    01/31/23 0114  Inpatient Vascular Surgery Consult  Once        Specialty:  Vascular Surgery  Provider:  Randy Ott MD    01/31/23 0115    01/31/23 0101  Inpatient Spiritual Care Consult  Once        Provider:  (Not yet assigned)    01/31/23 0100    01/31/23 0008  Inpatient Case Management  Consult  Once        Provider:  (Not yet assigned)    01/31/23 0007              Procedures     * Surgery not found *      Imaging Results (All)     Procedure Component Value Units Date/Time    CT Outside Films [[id number]] Resulted: 01/31/23 0800     Updated: 01/31/23 0800    Narrative:      This procedure was auto-finalized with no dictation required.        Results for orders placed during the hospital encounter of 10/14/16    Duplex Venous Lower Extremity - Left  CAR    Interpretation Summary  · Normal left lower extremity venous duplex scan.    Results for orders placed in visit on 07/28/21    Echocardiogram Scan    Pertinent Labs     Results from last 7 days   Lab Units 02/03/23  0639 02/02/23  1648 02/02/23  0558 02/02/23  0012 02/01/23  1555 02/01/23  0811 01/31/23  0942 01/31/23  0305   WBC 10*3/mm3 6.63  --   --   --   --  8.32  --  10.01   HEMOGLOBIN g/dL 11.4* 13.3 11.2* 11.6*   < > 11.9*   < > 11.5*   PLATELETS 10*3/mm3 173  --   --   --   --  174  --  147    < > = values in this interval not displayed.     Results from last 7 days   Lab Units 02/03/23  0639 02/02/23  0558 02/01/23  0811 01/31/23  0305   SODIUM mmol/L 140 137 144 136   POTASSIUM mmol/L 3.7 3.6 4.0 3.7   CHLORIDE mmol/L 107 107 110* 105   CO2 mmol/L 25.0 22.4 22.8 22.5   BUN mg/dL 30* 35* 36* 37*   CREATININE mg/dL 0.61 0.72 0.73 1.07*   GLUCOSE mg/dL 100* 103* 110* 122*   EGFR mL/min/1.73 86.1 80.5 79.2 50.1*     Results from last 7 days   Lab Units 02/01/23  0811   ALBUMIN g/dL 3.3*   BILIRUBIN mg/dL 0.5   ALK PHOS U/L 76   AST (SGOT) U/L 23   ALT (SGPT) U/L 14     Results from last 7 days   Lab Units 02/03/23  0639 02/02/23  0558 02/01/23  0811 01/31/23  0305   CALCIUM mg/dL 8.4* 8.4* 8.4* 8.2*   ALBUMIN g/dL  --   --  3.3*  --    MAGNESIUM mg/dL 1.8 1.7 1.8  --      Results from last 7 days   Lab Units 02/01/23  0811 01/30/23  1343   LIPASE U/L 30 52*             Invalid input(s): LDLCALC          Test Results Pending at Discharge       Discharge Details        Discharge Medications      Changes to Medications      Instructions Start Date   potassium chloride 20 MEQ CR tablet  Commonly known as: KLOR-CON  What changed: when to take this   20 mEq, Oral, Daily         Continue These Medications      Instructions Start Date   calcitriol 0.5 MCG capsule  Commonly known as: ROCALTROL   0.5 mcg, Oral, 2 Times Daily      cholecalciferol 25 MCG (1000 UT) tablet  Commonly known as: VITAMIN D3   2,000  Units, Oral, Daily, HOLD PRIOR TO SURG      cyanocobalamin 1000 MCG/ML injection   1,000 mcg, Intramuscular, Every 30 Days      dabigatran etexilate 150 MG capsu  Commonly known as: Pradaxa   150 mg, Oral, 2 Times Daily      dilTIAZem  MG 24 hr capsule  Commonly known as: CARDIZEM CD   120 mg, Oral, Daily      doxycycline 100 MG capsule  Commonly known as: VIBRAMYCIN   100 mg, Oral, 2 Times Daily      ICAPS AREDS 2 PO   1 capsule, Oral, 2 Times Daily      Melatonin 10 MG tablet   10 mg, Oral, PATIENT TAKES ONE EVERY EVENING/UNSURE IF TABLET OR CAPSULE       pravastatin 80 MG tablet  Commonly known as: PRAVACHOL   80 mg, Oral, Nightly      raloxifene 60 MG tablet  Commonly known as: EVISTA   60 mg, Oral, Daily      sertraline 100 MG tablet  Commonly known as: ZOLOFT   100 mg, Oral, Daily      Synthroid 175 MCG tablet  Generic drug: levothyroxine   175 mcg, Oral, Daily      timolol 0.5 % ophthalmic solution  Commonly known as: TIMOPTIC   1 drop, Both Eyes, 2 Times Daily         Stop These Medications    mupirocin 2 % ointment  Commonly known as: BACTROBAN     risperiDONE 0.25 MG tablet  Commonly known as: risperDAL            Allergies   Allergen Reactions   • Ciprofloxacin Other (See Comments)     neck pain; lips and eyes swelled   • Other      ALL DRUGS IN THE FLUOROQUINOLONES       Discharge Disposition:  Home or Self Care      Discharge Diet:  Diet Order   Procedures   • Diet: Regular/House Diet; Texture: Regular Texture (IDDSI 7); Fluid Consistency: Thin (IDDSI 0)       Discharge Activity:   as tolerated    CODE STATUS:    Code Status and Medical Interventions:   Ordered at: 01/31/23 1528     Code Status (Patient has no pulse and is not breathing):    No CPR (Do Not Attempt to Resuscitate)     Medical Interventions (Patient has pulse or is breathing):    Full Support     Comments:    DNR paperwork in chart       Future Appointments   Date Time Provider Department Center   6/19/2023  3:45 PM Surjit Gutierrez  MD Romulo [facility]     Additional Instructions for the Follow-ups that You Need to Schedule     Discharge Follow-up with PCP   As directed       Currently Documented PCP:    Surjit Gutierrez MD    PCP Phone Number:    [phone]     Follow Up Details: Dr. Gutierrez (PCP) in 1 week            Follow-up Information     Surjit Gutierrez MD .    Specialty: Family Medicine  Why: Dr. Gutierrez (PCP) in 1 week  Contact information:  [address] [place] [id number]  [phone]                         Additional Instructions for the Follow-ups that You Need to Schedule     Discharge Follow-up with PCP   As directed       Currently Documented PCP:    Surjit Gutierrez MD    PCP Phone Number:    [phone]     Follow Up Details: Dr. Gutierrez (PCP) in 1 week           Time Spent on Discharge:  Greater than 30 minutes      Haile Dubose MD  [facility]  02/03/23  10:44 EST

## 2023-02-06 ENCOUNTER — TRANSITIONAL CARE MANAGEMENT TELEPHONE ENCOUNTER (OUTPATIENT)
Dept: CALL CENTER | Facility: HOSPITAL | Age: 88
End: 2023-02-06
Payer: MEDICARE

## 2023-02-06 ENCOUNTER — TELEPHONE (OUTPATIENT)
Dept: FAMILY MEDICINE CLINIC | Age: 88
End: 2023-02-06
Payer: MEDICARE

## 2023-02-06 NOTE — OUTREACH NOTE
Call Center TCM Note    Flowsheet Row Responses   [facility] patient discharged from? [place]   Does the patient have one of the following disease processes/diagnoses(primary or secondary)? Other   TCM attempt successful? Yes  [VR listing dtr]   Call start time 1156   Call end time 1204   Discharge diagnosis Rectal bleed,   Anemia   Person spoke with today (if not patient) and relationship dtr- Aimee Acevedo reviewed with patient/caregiver? Yes   Is the patient having any side effects they believe may be caused by any medication additions or changes? No   Does the patient have all medications ordered at discharge? Yes   Prescription comments Dtr is concerned that the potassium was decreased to once a day.    Is the patient taking all medications as directed (includes completed medication regime)? N/A   Comments 2/14/2023  2:45 PM  HOSPITAL FOLLOW UP    Does the patient have an appointment with their PCP within 7 days of discharge? Yes   Psychosocial issues? No   Psychosocial comments Pt has 24/7 caregiver   Comments Pt having diarrhea since DC, decreased appetite but she is trying to stay hydrated. Dtr unsure how often pt is urinating. Sleeping most of day since DC, c/o fatigue.    Did the patient receive a copy of their discharge instructions? Yes   Nursing interventions Reviewed instructions with patient   What is the patient's perception of their health status since discharge? Same   Is the patient/caregiver able to teach back signs and symptoms related to disease process for when to call PCP? Yes   Is the patient/caregiver able to teach back the hierarchy of who to call/visit for symptoms/problems? PCP, Specialist, Home health nurse, Urgent Care, ED, 911 Yes   If the patient is a current smoker, are they able to teach back resources for cessation? Not a smoker   TCM call completed? Yes   Call end time 1204   Would this patient benefit from a Referral to [employer]? No   Is the patient interested in  additional calls from an ambulatory ?  NOTE:  applies to high risk patients requiring additional follow-up. No          Judy Cardona RN    2/6/2023, 12:04 EST

## 2023-02-06 NOTE — TELEPHONE ENCOUNTER
She was discharged 02/03/2023 from [facility] in [place]. She was in there for some rectal bleeding and stomach pain. I scheduled her appt for 02/14/2023 at 2:45pm.

## 2023-02-13 ENCOUNTER — READMISSION MANAGEMENT (OUTPATIENT)
Dept: CALL CENTER | Facility: HOSPITAL | Age: 88
End: 2023-02-13
Payer: MEDICARE

## 2023-02-13 NOTE — OUTREACH NOTE
Medical Week 2 Survey    Flowsheet Row Responses   [facility] patient discharged from? [place]   Does the patient have one of the following disease processes/diagnoses(primary or secondary)? Other   Week 2 attempt successful? No   Unsuccessful attempts Attempt 1          Judy RAMIREZ - Registered Nurse

## 2023-02-14 ENCOUNTER — OFFICE VISIT (OUTPATIENT)
Dept: FAMILY MEDICINE CLINIC | Age: 88
End: 2023-02-14
Payer: MEDICARE

## 2023-02-14 ENCOUNTER — LAB (OUTPATIENT)
Dept: LAB | Facility: HOSPITAL | Age: 88
End: 2023-02-14
Payer: MEDICARE

## 2023-02-14 VITALS
HEART RATE: 64 BPM | WEIGHT: 136.8 LBS | DIASTOLIC BLOOD PRESSURE: 76 MMHG | HEIGHT: 67 IN | SYSTOLIC BLOOD PRESSURE: 149 MMHG | TEMPERATURE: 97.6 F | BODY MASS INDEX: 21.47 KG/M2

## 2023-02-14 DIAGNOSIS — K62.5 RECTAL BLEEDING: ICD-10-CM

## 2023-02-14 DIAGNOSIS — R10.13 EPIGASTRIC PAIN: ICD-10-CM

## 2023-02-14 DIAGNOSIS — I48.0 PAROXYSMAL A-FIB: ICD-10-CM

## 2023-02-14 DIAGNOSIS — K62.5 RECTAL BLEEDING: Primary | ICD-10-CM

## 2023-02-14 DIAGNOSIS — R19.7 DIARRHEA, UNSPECIFIED TYPE: ICD-10-CM

## 2023-02-14 DIAGNOSIS — E03.9 ACQUIRED HYPOTHYROIDISM: ICD-10-CM

## 2023-02-14 DIAGNOSIS — K21.9 GASTROESOPHAGEAL REFLUX DISEASE, UNSPECIFIED WHETHER ESOPHAGITIS PRESENT: ICD-10-CM

## 2023-02-14 LAB
DEPRECATED RDW RBC AUTO: 43.8 FL (ref 37–54)
ERYTHROCYTE [DISTWIDTH] IN BLOOD BY AUTOMATED COUNT: 13.8 % (ref 12.3–15.4)
HCT VFR BLD AUTO: 43 % (ref 34–46.6)
HGB BLD-MCNC: 13.6 G/DL (ref 12–15.9)
MCH RBC QN AUTO: 27.4 PG (ref 26.6–33)
MCHC RBC AUTO-ENTMCNC: 31.6 G/DL (ref 31.5–35.7)
MCV RBC AUTO: 86.5 FL (ref 79–97)
PLATELET # BLD AUTO: 229 10*3/MM3 (ref 140–450)
PMV BLD AUTO: 10 FL (ref 6–12)
RBC # BLD AUTO: 4.97 10*6/MM3 (ref 3.77–5.28)
WBC NRBC COR # BLD: 10.18 10*3/MM3 (ref 3.4–10.8)

## 2023-02-14 PROCEDURE — 1111F DSCHRG MED/CURRENT MED MERGE: CPT | Performed by: FAMILY MEDICINE

## 2023-02-14 PROCEDURE — 84443 ASSAY THYROID STIM HORMONE: CPT

## 2023-02-14 PROCEDURE — 80053 COMPREHEN METABOLIC PANEL: CPT

## 2023-02-14 PROCEDURE — 99495 TRANSJ CARE MGMT MOD F2F 14D: CPT | Performed by: FAMILY MEDICINE

## 2023-02-14 PROCEDURE — 36415 COLL VENOUS BLD VENIPUNCTURE: CPT

## 2023-02-14 PROCEDURE — 85027 COMPLETE CBC AUTOMATED: CPT

## 2023-02-14 RX ORDER — PANTOPRAZOLE SODIUM 40 MG/1
40 TABLET, DELAYED RELEASE ORAL DAILY
Qty: 30 TABLET | Refills: 0 | Status: SHIPPED | OUTPATIENT
Start: 2023-02-14

## 2023-02-14 RX ORDER — PANTOPRAZOLE SODIUM 40 MG/1
40 TABLET, DELAYED RELEASE ORAL DAILY
Qty: 30 TABLET | Refills: 0 | Status: SHIPPED | OUTPATIENT
Start: 2023-02-14 | End: 2023-02-14

## 2023-02-14 NOTE — PROGRESS NOTES
Becky Zavala presents to [facility].    Chief Complaint:  Follow up hospital stay, rectal bleeding    Transitional Care Management:   - Trinity was admitted to [facility] on 1/30/2023 with discharge on 2/3/2023.   - While inpatient, she was cared for by the hospitalist service including Dr. Dubose.   - Staff from [facility] reach out to her within 48 hours of hospital discharge to arrange follow-up.   - Her problems and medications have been reviewed and reconciled.    Subjective       History of Present Illness:  Trinity is being seen today in follow-up from her hospital stay at [facility].  She was admitted there after developing some rectal bleeding.  There was concern also that she might have a rupturing aortic aneurysm.  She was transferred from Psychiatric to [facility].  Internal medicine, infectious disease, and vascular surgery also her there.  There was not evidence of significant ongoing bleeding.  It was decided not to pursue endoscopy.  Her bleeding stopped spontaneously, and no specific intervention was entertained.  Vascular surgery did see her, and they did not feel there was a concern related to the aneurysm.    Trinity has been back home now for about 11 days.  Her daughter, Sharlene, states that Trinity is not wanting to eat.  She also is sleeping excessively.  She is actually lost about 10 pounds since she was discharged from the hospital.  She is having some issue with diarrhea.  She is incontinent of urine which is not new.    Review of Systems:  Review of Systems   Constitutional: Negative for chills and fever.   Respiratory: Positive for cough (some). Negative for shortness of breath.    Cardiovascular: Negative for chest pain and palpitations.   Gastrointestinal: Positive for abdominal pain and diarrhea. Negative for nausea and vomiting.   Neurological: Positive for dizziness.      Objective   Medical History:  Past Medical History:   • AAA  (abdominal aortic aneurysm)   • Alzheimer's disease, unspecified (HCC)   • Ankle sprain   • Anxiety   • Atrial fibrillation (HCC)   • Cataract   • Cellulitis   • Chronic atrial fibrillation (HCC)   • Chronic kidney disease   • COPD (chronic obstructive pulmonary disease) (HCC)   • Coronary artery disease   • Dementia (HCC)    SLIGHT PER DAUGHTER   • Disease of thyroid gland   • Diverticulitis of colon   • Essential (primary) hypertension   • Fracture, fibula   • Frequency of micturition   • Glaucoma   • Groin incision ulcer (HCC)    SMALL OPEN AREA TO LEFT GROIN   • Hemorrhage of anus and rectum   • History of heart attack   • HL (hearing loss)   • Hyperlipidemia   • Hypocalcemia   • Hypothyroidism   • Macular degeneration   • MSSA (methicillin susceptible Staphylococcus aureus) infection    CAUSE OF ANEURSYM PER DAUGHTER STATED   • Myocardial infarction (HCC)   • Neck pain    LOWER    • Neoplasm of uncertain behavior of skin   • Neuropathy   • Osteopenia   • Osteoporosis   • Other hypoparathyroidism (HCC)   • Other vitamin B12 deficiency anemias   • PONV (postoperative nausea and vomiting)   • Popliteal aneurysm (HCC)    LEFT   • Pressure sore    LEFT HEEL DRESSING INTACT   • PVD (peripheral vascular disease) (HCC)   • UTI (urinary tract infection)   • Visual impairment     Past Surgical History:   • APPENDECTOMY   • CHOLECYSTECTOMY   • COLONOSCOPY   • COLONOSCOPY    NBIH, diverticulosis, multiple polyps benign per path    • ENDOSCOPY    normal    • EYE SURGERY   • FRACTURE SURGERY   • HYSTERECTOMY   • ORIF FEMUR FRACTURE    left tib fib   • DC DIR RPR ANEURYSM & GRAFT COMMON FEMORAL ARTERY    Procedure: REPAIR OF LEFT MYCOTIC POPLITEAL ANEURYSM WITH ARTEGRAFT GRAFT, INTERPOSITION;  Surgeon: Randy Ott MD;  Location: [facility];  Service: Vascular   • DC IN-SITU VEIN BYPASS FEMORAL-POPLITEAL    Procedure: RESECTION OF INFECTED LEFT FEMORAL ANEURYSM, EXTERNAL ILIAC TO SFA BYPASS WITH REIMPLANTATION  PROFUNDA, AIF ARTERIOGRAM WITH LEFT LEG RUNOFF, SARTORIUS MUSCLE FLAP;  Surgeon: Randy Ott MD;  Location: [facility] MAIN OR;  Service: Vascular   • THYROID SURGERY   • UPPER GASTROINTESTINAL ENDOSCOPY      Family History   Problem Relation Age of Onset   • Breast cancer Daughter    • Other Daughter         anaphalaxysis   • Cancer Daughter    • Cancer Daughter      Social History     Tobacco Use   • Smoking status: Former     Types: Cigarettes   • Smokeless tobacco: Never   • Tobacco comments:     3 cig. A day   Substance Use Topics   • Alcohol use: Not Currently       Health Maintenance Due   Topic Date Due   • DXA SCAN  07/13/2014        Immunization History   Administered Date(s) Administered   • COVID-19 (PFIZER) BIVALENT BOOSTER 12+YRS 12/16/2022   • COVID-19 (PFIZER) PURPLE CAP 03/22/2021, 04/12/2021, 11/11/2021   • Flu Vaccine Intradermal Quad 18-64YR 01/23/2013   • Fluzone High Dose =>65 Years (Vaxcare ONLY) 11/17/2017   • Fluzone High-Dose 65+yrs 12/16/2022   • Fluzone Quad >6mos (Multi-dose) 11/18/2016   • Influenza, Unspecified 05/24/2021   • Pneumococcal Conjugate 13-Valent (PCV13) 03/16/2015   • Pneumococcal Polysaccharide (PPSV23) 11/09/2010   • Shingrix 01/21/2022, 04/19/2022   • Td 07/08/2018   • Tdap 07/08/2018, 07/08/2018       Allergies   Allergen Reactions   • Ciprofloxacin Other (See Comments)     neck pain; lips and eyes swelled   • Other      ALL DRUGS IN THE FLUOROQUINOLONES        Medications:  Current Outpatient Medications on File Prior to Visit   Medication Sig   • calcitriol (ROCALTROL) 0.5 MCG capsule Take 1 capsule by mouth 2 (Two) Times a Day.   • cholecalciferol (VITAMIN D3) 1000 UNITS tablet Take 2,000 Units by mouth Daily. HOLD PRIOR TO SURG   • cyanocobalamin 1000 MCG/ML injection Inject 1 mL into the appropriate muscle as directed by prescriber Every 30 (Thirty) Days.   • dabigatran etexilate (Pradaxa) 150 MG capsu Take 1 capsule by mouth 2 (Two) Times a Day.   • dilTIAZem CD  "(CARDIZEM CD) 120 MG 24 hr capsule Take 1 capsule by mouth Daily.   • doxycycline (VIBRAMYCIN) 100 MG capsule Take 1 capsule by mouth 2 (Two) Times a Day.   • Melatonin 10 MG tablet Take 10 mg by mouth. PATIENT TAKES ONE EVERY EVENING/UNSURE IF TABLET OR CAPSULE   • Multiple Vitamins-Minerals (ICAPS AREDS 2 PO) Take 1 capsule by mouth 2 (Two) Times a Day.   • potassium chloride (KLOR-CON) 20 MEQ CR tablet Take 1 tablet by mouth Daily.   • pravastatin (PRAVACHOL) 80 MG tablet Take 1 tablet by mouth Every Night.   • raloxifene (EVISTA) 60 MG tablet Take 1 tablet by mouth Daily.   • sertraline (ZOLOFT) 100 MG tablet Take 1 tablet by mouth Daily.   • Synthroid 175 MCG tablet Take 1 tablet by mouth Daily.   • timolol (TIMOPTIC) 0.5 % ophthalmic solution Administer 1 drop to both eyes 2 (Two) Times a Day.     No current facility-administered medications on file prior to visit.       Vital Signs:   /76 (BP Location: Right arm, Patient Position: Sitting)   Pulse 64   Temp 97.6 °F (36.4 °C) (Oral)   Ht 170.2 cm (67.01\")   Wt 62.1 kg (136 lb 12.8 oz)   BMI 21.42 kg/m²       Physical Exam:  Physical Exam  Vitals reviewed.   Constitutional:       General: She is not in acute distress.     Appearance: She is not ill-appearing.   Eyes:      Pupils: Pupils are equal, round, and reactive to light.   Neck:      Comments: No thyromegaly  Cardiovascular:      Rate and Rhythm: Normal rate and regular rhythm.   Pulmonary:      Effort: Pulmonary effort is normal.      Breath sounds: Normal breath sounds.   Abdominal:      General: There is no distension.      Palpations: Abdomen is soft.      Tenderness: There is no abdominal tenderness.   Musculoskeletal:      Cervical back: Neck supple.   Lymphadenopathy:      Cervical: No cervical adenopathy.   Skin:     Findings: No lesion or rash.   Neurological:      Mental Status: She is alert.         Result Review      The following data was reviewed by Surjit Gutierrez MD on " 02/14/2023.  Lab Results   Component Value Date    WBC 6.63 02/03/2023    HGB 11.4 (L) 02/03/2023    HCT 34.3 02/03/2023    MCV 85.8 02/03/2023     02/03/2023     Lab Results   Component Value Date    GLUCOSE 100 (H) 02/03/2023    BUN 30 (H) 02/03/2023    CREATININE 0.61 02/03/2023     02/03/2023    K 3.7 02/03/2023     02/03/2023    CO2 25.0 02/03/2023    CALCIUM 8.4 (L) 02/03/2023    PROTEINTOT 5.4 (L) 02/01/2023    ALBUMIN 3.3 (L) 02/01/2023    ALT 14 02/01/2023    AST 23 02/01/2023    ALKPHOS 76 02/01/2023    BILITOT 0.5 02/01/2023    EGFRIFNONA 63 12/14/2021    GLOB 2.1 02/01/2023    AGRATIO 1.6 02/01/2023    BCR 49.2 (H) 02/03/2023    ANIONGAP 8.0 02/03/2023      Lab Results   Component Value Date    CHOL 142 06/17/2022    CHLPL 159 05/24/2021    TRIG 138 06/17/2022    HDL 34 (L) 06/17/2022    LDL 83 06/17/2022     Lab Results   Component Value Date    TSH 0.460 12/16/2022     Lab Results   Component Value Date    HGBA1C 5.70 (H) 12/16/2022            Assessment and Plan:   Today, we have reviewed her care.  I am concerned by the constellation of ongoing symptoms that she is having including continued weight loss and some abdominal pain.  We will evaluate her further as noted below.  Also, I am recommending moving ahead with acid blocking medication as a precaution given her medication list and recent illness.  We will see what this testing shows and then be back in touch with them.       Diagnoses and all orders for this visit:    1. Rectal bleeding (Primary)  -     CBC (No Diff); Future    2. Diarrhea, unspecified type  -     Enteric Bacterial Panel - Stool, Per Rectum; Future  -     Clostridioides difficile Toxin, PCR - Stool, Per Rectum; Future    3. Paroxysmal A-fib (HCC)  -     Comprehensive Metabolic Panel; Future    4. Acquired hypothyroidism  -     TSH; Future    5. Gastroesophageal reflux disease, unspecified whether esophagitis present  -     Discontinue: pantoprazole (PROTONIX)  40 MG EC tablet; Take 1 tablet by mouth Daily.  Dispense: 30 tablet; Refill: 0  -     pantoprazole (PROTONIX) 40 MG EC tablet; Take 1 tablet by mouth Daily.  Dispense: 30 tablet; Refill: 0    6. Epigastric pain  -     Urinalysis With Microscopic - Urine, Clean Catch; Future      Follow Up   Return in about 4 months (around 6/19/2023) for Recheck as scheduled.  Patient was given instructions and counseling regarding her condition or for health maintenance advice. Please see specific information pulled into the AVS if appropriate.

## 2023-02-15 ENCOUNTER — TELEPHONE (OUTPATIENT)
Dept: FAMILY MEDICINE CLINIC | Age: 88
End: 2023-02-15

## 2023-02-15 ENCOUNTER — TELEMEDICINE (OUTPATIENT)
Dept: FAMILY MEDICINE CLINIC | Age: 88
End: 2023-02-15
Payer: MEDICARE

## 2023-02-15 ENCOUNTER — DOCUMENTATION (OUTPATIENT)
Dept: INFECTIOUS DISEASES | Facility: CLINIC | Age: 88
End: 2023-02-15
Payer: MEDICARE

## 2023-02-15 DIAGNOSIS — U07.1 COVID-19: Primary | ICD-10-CM

## 2023-02-15 LAB
ALBUMIN SERPL-MCNC: 3.4 G/DL (ref 3.5–5.2)
ALBUMIN/GLOB SERPL: 1.5 G/DL
ALP SERPL-CCNC: 139 U/L (ref 39–117)
ALT SERPL W P-5'-P-CCNC: 28 U/L (ref 1–33)
ANION GAP SERPL CALCULATED.3IONS-SCNC: 11 MMOL/L (ref 5–15)
AST SERPL-CCNC: 39 U/L (ref 1–32)
BILIRUB SERPL-MCNC: 0.8 MG/DL (ref 0–1.2)
BUN SERPL-MCNC: 29 MG/DL (ref 8–23)
BUN/CREAT SERPL: 27.4 (ref 7–25)
CALCIUM SPEC-SCNC: 8.7 MG/DL (ref 8.6–10.5)
CHLORIDE SERPL-SCNC: 103 MMOL/L (ref 98–107)
CO2 SERPL-SCNC: 26 MMOL/L (ref 22–29)
CREAT SERPL-MCNC: 1.06 MG/DL (ref 0.57–1)
EGFRCR SERPLBLD CKD-EPI 2021: 50.6 ML/MIN/1.73
GLOBULIN UR ELPH-MCNC: 2.3 GM/DL
GLUCOSE SERPL-MCNC: 136 MG/DL (ref 65–99)
POTASSIUM SERPL-SCNC: 4.5 MMOL/L (ref 3.5–5.2)
PROT SERPL-MCNC: 5.7 G/DL (ref 6–8.5)
SODIUM SERPL-SCNC: 140 MMOL/L (ref 136–145)
TSH SERPL DL<=0.05 MIU/L-ACNC: 0.04 UIU/ML (ref 0.27–4.2)

## 2023-02-15 PROCEDURE — 99213 OFFICE O/P EST LOW 20 MIN: CPT | Performed by: NURSE PRACTITIONER

## 2023-02-15 NOTE — PROGRESS NOTES
Chief Complaint  Becky Zavala presents to [facility] for Cough (Runny nose, h/a, pt tested positive for covid with at home test today/Dox [phone])      Subjective     History of Present Illness  Trinity  here today for concerns of URI symptoms  or possible covid / flu.      Known Exposure to positive case?  No  Date of exposure?  n/a  Date of symptoms start?   7 days   (Symptoms may appear 2-14 days after exposure )  Tested positive home     Fever or chills?  No  Cough?   yes  Shortness of breath or difficulty breathing?  yes  Fatigue?   yes  Muscle or body aches?  no   Headache?   yes  New loss of taste or smell? no  Sore throat?  yes  Congestion or runny nose? yes  Nausea or vomiting?   yes  Diarrhea?   yes    Taking any medications at home to help with symptoms?Yes  Any prior vaccine to covid?   yes  Any prior vaccine to flu this season? yes  Any significant health problems / existing lung / heart problems?  Yes, describe: cardiovascular      Any  emergency warning signs for COVID-19. No  Trouble breathing?    Persistent pain or pressure in the chest?    New confusion?   Inability to wake or stay awake?  Pale, gray, or blue-colored skin, lips, or nail beds, depending on skin tone?         Mode of Visit: Video  You have chosen to receive care through a telehealth visit.  Do you consent to use a video/audio connection for your medical care today? YES   Identity has been verified with 2 identifiers - (name and date of birth).    The visit included audio and video interaction. Patient is currently located : home and provider located :  Office  No technical issues occurred during this visit.         Assessment and Plan       Diagnoses and all orders for this visit:    1. COVID-19 (Primary)  Comments:  at this point, no options for treatment other than symptomatic- recommend mucinex, tylenol for pain/body aches.  If symptoms worsen- to office/UC or ER         Follow Up   Return  if symptoms worsen or fail to improve.      No orders of the defined types were placed in this encounter.      There are no discontinued medications.         Review of Systems    Objective     There were no vitals filed for this visit.  There is no height or weight on file to calculate BMI.     Physical Exam  Constitutional:       Appearance: Normal appearance.   HENT:      Head: Normocephalic.   Musculoskeletal:         General: Normal range of motion.      Cervical back: Normal range of motion.   Neurological:      General: No focal deficit present.      Mental Status: She is alert and oriented to person, place, and time.   Psychiatric:         Mood and Affect: Mood normal.         Thought Content: Thought content normal.            Result Review                       Allergies   Allergen Reactions   • Ciprofloxacin Other (See Comments)     neck pain; lips and eyes swelled   • Other      ALL DRUGS IN THE FLUOROQUINOLONES      Past Medical History:   Diagnosis Date   • AAA (abdominal aortic aneurysm)    • Alzheimer's disease, unspecified (HCC)    • Ankle sprain June 2022   • Anxiety    • Atrial fibrillation (HCC)    • Cataract    • Cellulitis    • Chronic atrial fibrillation (HCC)    • Chronic kidney disease    • COPD (chronic obstructive pulmonary disease) (HCC)    • Coronary artery disease    • Dementia (HCC)     SLIGHT PER DAUGHTER   • Disease of thyroid gland    • Diverticulitis of colon 2008   • Essential (primary) hypertension    • Fracture, fibula 2016   • Frequency of micturition    • Glaucoma    • Groin incision ulcer (HCC)     SMALL OPEN AREA TO LEFT GROIN   • Hemorrhage of anus and rectum    • History of heart attack    • HL (hearing loss)    • Hyperlipidemia    • Hypocalcemia    • Hypothyroidism    • Macular degeneration    • MSSA (methicillin susceptible Staphylococcus aureus) infection     CAUSE OF ANEURSYM PER DAUGHTER STATED   • Myocardial infarction (HCC)    • Neck pain     LOWER    • Neoplasm of  uncertain behavior of skin    • Neuropathy    • Osteopenia    • Osteoporosis    • Other hypoparathyroidism (HCC)    • Other vitamin B12 deficiency anemias    • PONV (postoperative nausea and vomiting)    • Popliteal aneurysm (HCC)     LEFT   • Pressure sore     LEFT HEEL DRESSING INTACT   • PVD (peripheral vascular disease) ([facility])    • UTI (urinary tract infection)    • Visual impairment      Current Outpatient Medications   Medication Sig Dispense Refill   • calcitriol (ROCALTROL) 0.5 MCG capsule Take 1 capsule by mouth 2 (Two) Times a Day. 180 capsule 3   • cholecalciferol (VITAMIN D3) 1000 UNITS tablet Take 2,000 Units by mouth Daily. HOLD PRIOR TO SURG     • cyanocobalamin 1000 MCG/ML injection Inject 1 mL into the appropriate muscle as directed by prescriber Every 30 (Thirty) Days. 3 mL 3   • dilTIAZem CD (CARDIZEM CD) 120 MG 24 hr capsule Take 1 capsule by mouth Daily. 90 capsule 3   • doxycycline (VIBRAMYCIN) 100 MG capsule Take 1 capsule by mouth 2 (Two) Times a Day. 180 capsule 3   • Melatonin 10 MG tablet Take 10 mg by mouth. PATIENT TAKES ONE EVERY EVENING/UNSURE IF TABLET OR CAPSULE     • Multiple Vitamins-Minerals (ICAPS AREDS 2 PO) Take 1 capsule by mouth 2 (Two) Times a Day.     • pantoprazole (PROTONIX) 40 MG EC tablet Take 1 tablet by mouth Daily. 30 tablet 0   • potassium chloride (KLOR-CON) 20 MEQ CR tablet Take 1 tablet by mouth Daily. 180 tablet 3   • pravastatin (PRAVACHOL) 80 MG tablet Take 1 tablet by mouth Every Night. 90 tablet 3   • raloxifene (EVISTA) 60 MG tablet Take 1 tablet by mouth Daily. 90 tablet 3   • sertraline (ZOLOFT) 100 MG tablet Take 1 tablet by mouth Daily. 90 tablet 3   • timolol (TIMOPTIC) 0.5 % ophthalmic solution Administer 1 drop to both eyes 2 (Two) Times a Day.     • busPIRone (BUSPAR) 5 MG tablet Take 1 tablet by mouth 2 (Two) Times a Day As Needed (anxiety). 30 tablet 1   • colestipol (COLESTID) 1 g tablet Take 1 tablet by mouth Daily. 30 tablet 0   • Pradaxa 150 MG  capsu TAKE 1 CAPSULE TWICE DAILY 180 capsule 1   • Synthroid 137 MCG tablet Take 1 tablet by mouth Daily. Brand-name only. 90 tablet 3     No current facility-administered medications for this visit.     Past Surgical History:   Procedure Laterality Date   • APPENDECTOMY     • CHOLECYSTECTOMY     • COLONOSCOPY     • COLONOSCOPY N/A 06/27/2019    NBIH, diverticulosis, multiple polyps benign per path    • ENDOSCOPY N/A 06/26/2019    normal    • EYE SURGERY     • FRACTURE SURGERY     • HYSTERECTOMY     • ORIF FEMUR FRACTURE  02/18/2016    left tib fib   • NC DIR RPR ANEURYSM & GRAFT COMMON FEMORAL ARTERY Left 12/09/2016    Procedure: REPAIR OF LEFT MYCOTIC POPLITEAL ANEURYSM WITH ARTEGRAFT GRAFT, INTERPOSITION;  Surgeon: Randy Ott MD;  Location: [facility] OR;  Service: Vascular   • NC IN-SITU VEIN BYPASS FEMORAL-POPLITEAL Left 10/07/2016    Procedure: RESECTION OF INFECTED LEFT FEMORAL ANEURYSM, EXTERNAL ILIAC TO SFA BYPASS WITH REIMPLANTATION PROFUNDA, AIF ARTERIOGRAM WITH LEFT LEG RUNOFF, SARTORIUS MUSCLE FLAP;  Surgeon: Randy Ott MD;  Location: [facility] OR;  Service: Vascular   • THYROID SURGERY     • UPPER GASTROINTESTINAL ENDOSCOPY  06/27/2019      Health Maintenance Due   Topic Date Due   • DXA SCAN  07/13/2014      Immunization History   Administered Date(s) Administered   • COVID-19 (PFIZER) BIVALENT BOOSTER 12+YRS 12/16/2022   • COVID-19 (PFIZER) PURPLE CAP 03/22/2021, 04/12/2021, 11/11/2021   • Flu Vaccine Intradermal Quad 18-64YR 01/23/2013   • Fluzone High Dose =>65 Years (Vaxcare ONLY) 11/17/2017   • Fluzone High-Dose 65+yrs 12/16/2022   • Fluzone Quad >6mos (Multi-dose) 11/18/2016   • Influenza, Unspecified 05/24/2021   • Pneumococcal Conjugate 13-Valent (PCV13) 03/16/2015   • Pneumococcal Polysaccharide (PPSV23) 11/09/2010   • Shingrix 01/21/2022, 04/19/2022   • Td 07/08/2018   • Tdap 07/08/2018, 07/08/2018

## 2023-02-15 NOTE — PROGRESS NOTES
Pt dtr called with anorexia and n/v.  Has been on doxy for 5+ years and unlikely to be causing sx.  Maybe related to ileus seen on imaging as sx have not improved for weeks?

## 2023-02-15 NOTE — TELEPHONE ENCOUNTER
Caller: Sharlene Solomon    Relationship to patient: Emergency Contact    Best call back number: [phone]    Date of exposure: UNKNOWN    Date of positive COVID19 test: IN HOME 02/15/2023      COVID19 symptoms: COUGH, CONGESTION LOSS OF APPETITE POSSIBLY LOSS OF TASTE    Date of initial quarantine: 02/15/2023    Additional information or concerns: IS THERE A MEDICATION PATIENT  CAN TAKE FOR THE COVID OR ONE TO TREAT THE SYMPTOMS PATIENT WAS RECENTLY HOSPITALIZED AND DOES NOT WANT TO RISK HAVING TO GO BACK PLEASE CONTACT CALLER AND IF PRESCRIPTION IS SENT OR NOT    What is the patients preferred pharmacy: [facility]  [phone]

## 2023-02-16 DIAGNOSIS — I48.0 PAROXYSMAL A-FIB: ICD-10-CM

## 2023-02-16 PROCEDURE — 87493 C DIFF AMPLIFIED PROBE: CPT

## 2023-02-16 PROCEDURE — 87505 NFCT AGENT DETECTION GI: CPT

## 2023-02-16 PROCEDURE — 81001 URINALYSIS AUTO W/SCOPE: CPT

## 2023-02-16 RX ORDER — ATENOLOL 100 MG/1
TABLET ORAL
Qty: 180 CAPSULE | Refills: 1 | Status: SHIPPED | OUTPATIENT
Start: 2023-02-16

## 2023-02-17 ENCOUNTER — LAB (OUTPATIENT)
Dept: LAB | Facility: HOSPITAL | Age: 88
End: 2023-02-17
Payer: MEDICARE

## 2023-02-17 ENCOUNTER — READMISSION MANAGEMENT (OUTPATIENT)
Dept: CALL CENTER | Facility: HOSPITAL | Age: 88
End: 2023-02-17
Payer: MEDICARE

## 2023-02-17 DIAGNOSIS — R19.7 DIARRHEA, UNSPECIFIED TYPE: ICD-10-CM

## 2023-02-17 DIAGNOSIS — R10.13 EPIGASTRIC PAIN: ICD-10-CM

## 2023-02-17 LAB
027 TOXIN: NORMAL
AMORPH URATE CRY URNS QL MICRO: ABNORMAL /HPF
BACTERIA UR QL AUTO: ABNORMAL /HPF
BILIRUB UR QL STRIP: NEGATIVE
C DIFF TOX GENS STL QL NAA+PROBE: NEGATIVE
CLARITY UR: ABNORMAL
COD CRY URNS QL: ABNORMAL /HPF
COLOR UR: YELLOW
GLUCOSE UR STRIP-MCNC: NEGATIVE MG/DL
HGB UR QL STRIP.AUTO: NEGATIVE
KETONES UR QL STRIP: NEGATIVE
LEUKOCYTE ESTERASE UR QL STRIP.AUTO: ABNORMAL
NITRITE UR QL STRIP: NEGATIVE
PH UR STRIP.AUTO: 5.5 [PH] (ref 5–8)
PROT UR QL STRIP: NEGATIVE
RBC # UR STRIP: ABNORMAL /HPF
REF LAB TEST METHOD: ABNORMAL
SP GR UR STRIP: 1.02 (ref 1–1.03)
SQUAMOUS #/AREA URNS HPF: ABNORMAL /HPF
TRANS CELLS #/AREA URNS HPF: ABNORMAL /HPF
UROBILINOGEN UR QL STRIP: ABNORMAL
WBC # UR STRIP: ABNORMAL /HPF

## 2023-02-17 NOTE — OUTREACH NOTE
Medical Week 2 Survey    Flowsheet Row Responses   [facility] patient discharged from? [place]   Does the patient have one of the following disease processes/diagnoses(primary or secondary)? Other   Week 2 attempt successful? No   Unsuccessful attempts Attempt 2          Ever SCHREIBER - Registered Nurse

## 2023-02-18 LAB
C COLI+JEJ+UPSA DNA STL QL NAA+NON-PROBE: NOT DETECTED
EC STX1+STX2 GENES STL QL NAA+NON-PROBE: NOT DETECTED
S ENT+BONG DNA STL QL NAA+NON-PROBE: NOT DETECTED
SHIGELLA SP+EIEC IPAH ST NAA+NON-PROBE: NOT DETECTED

## 2023-02-20 ENCOUNTER — PATIENT MESSAGE (OUTPATIENT)
Dept: FAMILY MEDICINE CLINIC | Age: 88
End: 2023-02-20
Payer: MEDICARE

## 2023-02-20 DIAGNOSIS — E03.9 ACQUIRED HYPOTHYROIDISM: ICD-10-CM

## 2023-02-20 DIAGNOSIS — R19.7 DIARRHEA, UNSPECIFIED TYPE: Primary | ICD-10-CM

## 2023-02-20 DIAGNOSIS — F03.918 DEMENTIA WITH BEHAVIORAL DISTURBANCE: ICD-10-CM

## 2023-02-20 NOTE — OUTREACH NOTE
Prep Survey      Responses   Facility patient discharged from?  [place]   Is patient eligible?  Yes   Discharge diagnosis  Gastrointestinal hemorrhage with melena ,  Acute posthemorrhagic anemia    Does the patient have one of the following disease processes/diagnoses(primary or secondary)?  Other   Does the patient have Home health ordered?  No   Is there a DME ordered?  No   Prep survey completed?  Yes          Sun Arroyo RN        
(M6) obeys commands

## 2023-02-21 RX ORDER — LEVOTHYROXINE SODIUM 137 MCG
137 TABLET ORAL DAILY
Qty: 90 TABLET | Refills: 3 | Status: SHIPPED | OUTPATIENT
Start: 2023-02-21

## 2023-02-21 RX ORDER — MONTELUKAST SODIUM 4 MG/1
1 TABLET, CHEWABLE ORAL DAILY
Qty: 30 TABLET | Refills: 0 | Status: SHIPPED | OUTPATIENT
Start: 2023-02-21

## 2023-02-21 NOTE — TELEPHONE ENCOUNTER
"From: Becky Zavala  To: Surjit Romulo Matt  Sent: 2023 10:01 AM EST  Subject: Trinity Lucas  34    Dr. Gutierrez,   We have not seen any improvement in Mom's diarrhea. She is still only taking small bites of food. Should we see a gastro doctor about this since it has gone on for so long? Would you be able to give a referral that could get us an appointment soon? The family is divided on whether we should seek further treatment due to her decline. Some don't think she is strong enough for any testing, such as colonoscopy or Upper GI. What are your thoughts on this? We also wanted to know about the possibility of palliative care? Is this something that can be done in an \"at-home\" setting? She is taking so many meds with so little food, and we wanted guidance on which meds we may be able to stop giving her, if any. One last question.... a family member in the medical field suggested that we may want to try the medication cholestyramine(Questran). This may help with the diarrhea?. Is this something you would agree with prescribing? Thank you for your help and guidance.   Aimee"

## 2023-02-21 NOTE — TELEPHONE ENCOUNTER
Pod A, please reach out to Aimee on Wednesday.  Confirm that she received my message, and see if there are specific referrals or treatments they would like to pursue at this time.  Thanks.

## 2023-02-24 RX ORDER — BUSPIRONE HYDROCHLORIDE 5 MG/1
5 TABLET ORAL 2 TIMES DAILY PRN
Qty: 30 TABLET | Refills: 1 | Status: SHIPPED | OUTPATIENT
Start: 2023-02-24 | End: 2023-03-27

## 2023-03-01 ENCOUNTER — READMISSION MANAGEMENT (OUTPATIENT)
Dept: CALL CENTER | Facility: HOSPITAL | Age: 88
End: 2023-03-01
Payer: MEDICARE

## 2023-03-01 NOTE — OUTREACH NOTE
Medical Week 4 Survey    Flowsheet Row Responses   [facility] patient discharged from? [place]   Does the patient have one of the following disease processes/diagnoses(primary or secondary)? Other   Week 4 attempt successful? Yes   Call start time 1253   Call end time 1254   Discharge diagnosis Rectal bleed,   Anemia   Person spoke with today (if not patient) and relationship daughter-Aimee Acevedo reviewed with patient/caregiver? Yes   Is the patient taking all medications as directed (includes completed medication regime)? Yes   Has the patient kept scheduled appointments due by today? Yes   Is the patient still receiving Home Health Services? N/A   What is the patient's perception of their health status since discharge? Improving   Week 4 Call Completed? Yes   Would the patient like one additional call? No   Graduated Yes   Is the patient interested in additional calls from an ambulatory ?  NOTE:  applies to high risk patients requiring additional follow-up. No   Did the patient feel the follow up calls were helpful during their recovery period? Yes   Was the number of calls appropriate? Yes          Deonna SCHREIBER - Registered Nurse

## 2023-03-02 ENCOUNTER — HOSPITAL ENCOUNTER (OUTPATIENT)
Dept: GENERAL RADIOLOGY | Facility: HOSPITAL | Age: 88
Discharge: HOME | End: 2023-03-02
Payer: MEDICARE

## 2023-03-02 ENCOUNTER — OFFICE VISIT (OUTPATIENT)
Dept: FAMILY MEDICINE CLINIC | Age: 88
End: 2023-03-02
Payer: MEDICARE

## 2023-03-02 VITALS
BODY MASS INDEX: 21.41 KG/M2 | HEART RATE: 62 BPM | HEIGHT: 67 IN | SYSTOLIC BLOOD PRESSURE: 147 MMHG | TEMPERATURE: 97.9 F | WEIGHT: 136.4 LBS | DIASTOLIC BLOOD PRESSURE: 85 MMHG | OXYGEN SATURATION: 100 %

## 2023-03-02 DIAGNOSIS — K62.5 RECTAL BLEEDING: ICD-10-CM

## 2023-03-02 DIAGNOSIS — R63.4 WEIGHT LOSS: ICD-10-CM

## 2023-03-02 DIAGNOSIS — M79.671 RIGHT FOOT PAIN: ICD-10-CM

## 2023-03-02 DIAGNOSIS — M79.672 LEFT FOOT PAIN: ICD-10-CM

## 2023-03-02 DIAGNOSIS — M79.671 RIGHT FOOT PAIN: Primary | ICD-10-CM

## 2023-03-02 PROCEDURE — 73630 X-RAY EXAM OF FOOT: CPT

## 2023-03-02 PROCEDURE — 99214 OFFICE O/P EST MOD 30 MIN: CPT | Performed by: NURSE PRACTITIONER

## 2023-03-02 NOTE — PROGRESS NOTES
"Chief Complaint  Foot Pain ((Right) Sx started last night ), Foot Swelling (Bilateral and bruising ), and Rectal Bleeding (Pt was seen [facility] )    Subjective    Patient is in today with complaints of bilateral lower extremity swelling, right foot pain after a fall, and rectal bleeding.  Patient is in a wheelchair, caregiver with her reports that she has aides come out and help her during the day..  Reports that she has had many frequent falls recently.  Patient was also recently admitted to [facility] with rectal bleeding.  Family reports that rectal bleeding had resolved, but has now returned.  Patient has lost approximately 10 pounds.  Family reports that she eats in the morning, but does not eat at nighttime.        Becky Zavala presents to [facility]  Foot Pain  This is a new problem. The current episode started in the past 7 days. The problem occurs intermittently. The problem has been unchanged. Associated symptoms include anorexia, a change in bowel habit, fatigue, joint swelling and weakness. Pertinent negatives include no abdominal pain, chills or fever. The symptoms are aggravated by walking and standing. She has tried ice for the symptoms. The treatment provided no relief.   Rectal Bleeding  This is a recurrent problem. The current episode started in the past 7 days. The problem occurs intermittently. The problem has been gradually worsening. Associated symptoms include anorexia, a change in bowel habit, fatigue, joint swelling and weakness. Pertinent negatives include no abdominal pain, chills or fever.       Objective   Vital Signs:  /85 (BP Location: Left arm, Patient Position: Sitting, Cuff Size: Adult)   Pulse 62   Temp 97.9 °F (36.6 °C) (Oral)   Ht 170.2 cm (67.01\")   Wt 61.9 kg (136 lb 6.4 oz)   SpO2 100% Comment: room air  BMI 21.36 kg/m²   Estimated body mass index is 21.36 kg/m² as calculated from the following:    Height as of this " "encounter: 170.2 cm (67.01\").    Weight as of this encounter: 61.9 kg (136 lb 6.4 oz).       BMI is within normal parameters. No other follow-up for BMI required.      Physical Exam  HENT:      Head: Normocephalic.   Cardiovascular:      Rate and Rhythm: Normal rate and regular rhythm.   Pulmonary:      Effort: Pulmonary effort is normal. No respiratory distress.      Breath sounds: Normal breath sounds. No stridor. No wheezing, rhonchi or rales.   Abdominal:      General: Bowel sounds are increased.      Palpations: Abdomen is soft.   Musculoskeletal:      Right lower le+ Edema present.      Left lower le+ Edema present.   Skin:     Findings: Bruising present.   Neurological:      Mental Status: She is alert. Mental status is at baseline.        Result Review :        PROCEDURE:  XR FOOT 3+ VW RIGHT     COMPARISON: 2022.     INDICATIONS:  Right foot pain and bruising for 2 days.     FINDINGS:        3 views were obtained.  No acute fracture or acute malalignment is identified.    Generalized osteopenia is seen.  There are arterial calcifications.  Mild degenerative and   enthesopathic changes are present.  No definite retained radiopaque foreign body.  No subcutaneous   emphysema.  If symptoms or clinical concerns persist, consider imaging follow-up.     IMPRESSION:               No acute fracture or acute malalignment is identified.      PROCEDURE:  XR FOOT 3+ VW LEFT     COMPARISON: 2022.     INDICATIONS:  Left foot pain and bruising for 2 days.     FINDINGS:        Three views left foot are provided for review.  There is a comminuted, intra-articular   fracture of the base of the left 5th metatarsal bone, healed by nonunion, similar to that seen   previously.  A new acute \"re-fracture\" in the same location is thought to be unlikely.  Generalized   osteopenia is seen.  Arterial calcifications are seen.  There is internal fixation hardware within   the distal left fibula, which is not " significantly changed.  No definite new fractures are   appreciated.  No dislocation.  Mild degenerative changes are present.  No unintended retained   foreign body is suspected.  If symptoms or clinical concerns persist, consider imaging follow-up.     IMPRESSION:               No new acute fracture.  There is an old comminuted intra-articular fracture of the base   of the left 5th metatarsal, healed by nonunion.  No dislocation.         Assessment and Plan   Diagnoses and all orders for this visit:    1. Right foot pain (Primary)  -     XR Foot 3+ View Right; Future    2. Left foot pain  -     XR Foot 3+ View Left; Future    3. Weight loss  -     Ambulatory Referral to Gastroenterology    4. Rectal bleeding  Comments:  ER for worsening   Orders:  -     Ambulatory Referral to Gastroenterology    Recent labs and images from hospitalization reviewed.  Spoke with family member Aimee Landis who wants to move forward with gastroenterology referral.  Continue walking boot, feet elevation and ice as needed.  Recommend bland diet.         Follow Up   Return in about 3 months (around 6/2/2023), or if symptoms worsen or fail to improve, for Next scheduled follow up with PCP.  Patient was given instructions and counseling regarding her condition or for health maintenance advice. Please see specific information pulled into the AVS if appropriate.

## 2023-03-23 RX ORDER — CYANOCOBALAMIN 1000 UG/ML
1000 INJECTION, SOLUTION INTRAMUSCULAR; SUBCUTANEOUS
Qty: 3 ML | Refills: 3 | Status: SHIPPED | OUTPATIENT
Start: 2023-03-23

## 2023-03-25 DIAGNOSIS — F03.918 DEMENTIA WITH BEHAVIORAL DISTURBANCE: ICD-10-CM

## 2023-03-27 RX ORDER — BUSPIRONE HYDROCHLORIDE 5 MG/1
5 TABLET ORAL 2 TIMES DAILY PRN
Qty: 30 TABLET | Refills: 1 | Status: SHIPPED | OUTPATIENT
Start: 2023-03-27

## 2023-04-12 ENCOUNTER — TELEPHONE (OUTPATIENT)
Dept: FAMILY MEDICINE CLINIC | Age: 88
End: 2023-04-12
Payer: MEDICARE

## 2023-04-12 DIAGNOSIS — I10 ESSENTIAL HYPERTENSION: ICD-10-CM

## 2023-04-12 DIAGNOSIS — E03.9 ACQUIRED HYPOTHYROIDISM: Primary | ICD-10-CM

## 2023-04-12 DIAGNOSIS — E78.2 MIXED HYPERLIPIDEMIA: ICD-10-CM

## 2023-04-12 NOTE — TELEPHONE ENCOUNTER
----- Message from Shy Perez LPN sent at 2/15/2023  9:00 AM EST -----  TICKLE TSH, free T4, CMP in 8 weeks for hypothyroidism, hyperlipidemia, hypertension

## 2023-04-21 ENCOUNTER — OFFICE VISIT (OUTPATIENT)
Dept: FAMILY MEDICINE CLINIC | Age: 88
End: 2023-04-21
Payer: MEDICARE

## 2023-04-21 ENCOUNTER — LAB (OUTPATIENT)
Dept: LAB | Facility: HOSPITAL | Age: 88
End: 2023-04-21
Payer: MEDICARE

## 2023-04-21 VITALS
HEART RATE: 61 BPM | OXYGEN SATURATION: 98 % | HEIGHT: 67 IN | SYSTOLIC BLOOD PRESSURE: 148 MMHG | BODY MASS INDEX: 21.31 KG/M2 | WEIGHT: 135.8 LBS | TEMPERATURE: 98.2 F | DIASTOLIC BLOOD PRESSURE: 88 MMHG

## 2023-04-21 DIAGNOSIS — L30.9 DERMATITIS: ICD-10-CM

## 2023-04-21 DIAGNOSIS — L30.9 DERMATITIS: Primary | ICD-10-CM

## 2023-04-21 DIAGNOSIS — E03.9 ACQUIRED HYPOTHYROIDISM: ICD-10-CM

## 2023-04-21 DIAGNOSIS — T14.8XXA ABRASION: ICD-10-CM

## 2023-04-21 DIAGNOSIS — E78.2 MIXED HYPERLIPIDEMIA: ICD-10-CM

## 2023-04-21 DIAGNOSIS — I10 ESSENTIAL HYPERTENSION: ICD-10-CM

## 2023-04-21 LAB
BASOPHILS # BLD AUTO: 0.01 10*3/MM3 (ref 0–0.2)
BASOPHILS NFR BLD AUTO: 0.1 % (ref 0–1.5)
DEPRECATED RDW RBC AUTO: 51.3 FL (ref 37–54)
EOSINOPHIL # BLD AUTO: 0.11 10*3/MM3 (ref 0–0.4)
EOSINOPHIL NFR BLD AUTO: 1.5 % (ref 0.3–6.2)
ERYTHROCYTE [DISTWIDTH] IN BLOOD BY AUTOMATED COUNT: 15.5 % (ref 12.3–15.4)
HCT VFR BLD AUTO: 39.9 % (ref 34–46.6)
HGB BLD-MCNC: 12.4 G/DL (ref 12–15.9)
IMM GRANULOCYTES # BLD AUTO: 0.01 10*3/MM3 (ref 0–0.05)
IMM GRANULOCYTES NFR BLD AUTO: 0.1 % (ref 0–0.5)
LYMPHOCYTES # BLD AUTO: 2.08 10*3/MM3 (ref 0.7–3.1)
LYMPHOCYTES NFR BLD AUTO: 28.7 % (ref 19.6–45.3)
MCH RBC QN AUTO: 27.9 PG (ref 26.6–33)
MCHC RBC AUTO-ENTMCNC: 31.1 G/DL (ref 31.5–35.7)
MCV RBC AUTO: 89.7 FL (ref 79–97)
MONOCYTES # BLD AUTO: 0.67 10*3/MM3 (ref 0.1–0.9)
MONOCYTES NFR BLD AUTO: 9.3 % (ref 5–12)
NEUTROPHILS NFR BLD AUTO: 4.36 10*3/MM3 (ref 1.7–7)
NEUTROPHILS NFR BLD AUTO: 60.3 % (ref 42.7–76)
PLATELET # BLD AUTO: 157 10*3/MM3 (ref 140–450)
PMV BLD AUTO: 10.4 FL (ref 6–12)
RBC # BLD AUTO: 4.45 10*6/MM3 (ref 3.77–5.28)
WBC NRBC COR # BLD: 7.24 10*3/MM3 (ref 3.4–10.8)

## 2023-04-21 PROCEDURE — 85652 RBC SED RATE AUTOMATED: CPT

## 2023-04-21 PROCEDURE — 84439 ASSAY OF FREE THYROXINE: CPT

## 2023-04-21 PROCEDURE — 85025 COMPLETE CBC W/AUTO DIFF WBC: CPT

## 2023-04-21 PROCEDURE — 82550 ASSAY OF CK (CPK): CPT

## 2023-04-21 PROCEDURE — 86140 C-REACTIVE PROTEIN: CPT

## 2023-04-21 PROCEDURE — 36415 COLL VENOUS BLD VENIPUNCTURE: CPT

## 2023-04-21 PROCEDURE — 84443 ASSAY THYROID STIM HORMONE: CPT

## 2023-04-21 PROCEDURE — 80053 COMPREHEN METABOLIC PANEL: CPT

## 2023-04-21 RX ORDER — DIPHENHYDRAMINE HCL 25 MG
25 TABLET ORAL EVERY 6 HOURS PRN
Qty: 56 TABLET | Refills: 0 | Status: SHIPPED | OUTPATIENT
Start: 2023-04-21 | End: 2023-05-05

## 2023-04-21 RX ORDER — FAMOTIDINE 20 MG/1
20 TABLET, FILM COATED ORAL 2 TIMES DAILY
Qty: 28 TABLET | Refills: 0 | Status: SHIPPED | OUTPATIENT
Start: 2023-04-21 | End: 2023-05-05

## 2023-04-21 NOTE — PROGRESS NOTES
Subjective     CHIEF COMPLAINT    Chief Complaint   Patient presents with   • Edema     Redness and swelling of (R) hand and bilateral legs. X 1 week    • Rash     (R) lower leg with swelling. X 3 weeks, pt states she made an apt with dermatologist for 5/1            History of Present Illness  This is an 88-year-old female presenting to the clinic, accompanied by her daughter, with complaint of rash for the last 3 weeks.  This initially started as small red spots on her right leg and then developed a generalized erythema of the right leg.  She has had similar lesions appearing on her left leg now as well as her bilateral hands.  She describes the rash as painful and itchy.  They have been putting hydrocortisone cream on it.  Since last week, Trinity's daughter feels like the swelling has worsened and the redness has increased.  She states that her legs had been so swollen and there was fluid leaking from the skin.  They have wrapped with gauze and Ace bandages which has helped that swelling.  They do have an appointment scheduled with dermatology for May 1, but given the worsening in the last week they wanted to be evaluated sooner.            Review of Systems   Constitutional: Negative for chills and fever.   Musculoskeletal: Negative for arthralgias, gait problem, joint swelling and myalgias.   Skin: Positive for rash. Negative for wound.   Neurological: Negative for weakness and numbness.            Past Medical History:   Diagnosis Date   • AAA (abdominal aortic aneurysm)    • Alzheimer's disease, unspecified    • Ankle sprain June 2022   • Anxiety    • Atrial fibrillation    • Cataract    • Cellulitis    • Chronic atrial fibrillation    • Chronic kidney disease    • COPD (chronic obstructive pulmonary disease)    • Coronary artery disease    • Dementia     SLIGHT PER DAUGHTER   • Disease of thyroid gland    • Diverticulitis of colon 2008   • Essential (primary) hypertension    • Fracture, fibula 2016   •  Frequency of micturition    • Glaucoma    • Groin incision ulcer     SMALL OPEN AREA TO LEFT GROIN   • Hemorrhage of anus and rectum    • History of heart attack    • HL (hearing loss)    • Hyperlipidemia    • Hypocalcemia    • Hypothyroidism    • Macular degeneration    • MSSA (methicillin susceptible Staphylococcus aureus) infection     CAUSE OF ANEURSYM PER DAUGHTER STATED   • Myocardial infarction    • Neck pain     LOWER    • Neoplasm of uncertain behavior of skin    • Neuropathy    • Osteopenia    • Osteoporosis    • Other hypoparathyroidism    • Other vitamin B12 deficiency anemias    • PONV (postoperative nausea and vomiting)    • Popliteal aneurysm     LEFT   • Pressure sore     LEFT HEEL DRESSING INTACT   • PVD (peripheral vascular disease)    • UTI (urinary tract infection)    • Visual impairment             Past Surgical History:   Procedure Laterality Date   • APPENDECTOMY     • CHOLECYSTECTOMY     • COLONOSCOPY     • COLONOSCOPY N/A 06/27/2019    NBIH, diverticulosis, multiple polyps benign per path    • ENDOSCOPY N/A 06/26/2019    normal    • EYE SURGERY     • FRACTURE SURGERY     • HYSTERECTOMY     • ORIF FEMUR FRACTURE  02/18/2016    left tib fib   • WV DIR RPR ANEURYSM & GRAFT COMMON FEMORAL ARTERY Left 12/09/2016    Procedure: REPAIR OF LEFT MYCOTIC POPLITEAL ANEURYSM WITH ARTEGRAFT GRAFT, INTERPOSITION;  Surgeon: Randy Ott MD;  Location: [facility] OR;  Service: Vascular   • WV IN-SITU VEIN BYPASS FEMORAL-POPLITEAL Left 10/07/2016    Procedure: RESECTION OF INFECTED LEFT FEMORAL ANEURYSM, EXTERNAL ILIAC TO SFA BYPASS WITH REIMPLANTATION PROFUNDA, AIF ARTERIOGRAM WITH LEFT LEG RUNOFF, SARTORIUS MUSCLE FLAP;  Surgeon: Randy Ott MD;  Location: [facility] OR;  Service: Vascular   • THYROID SURGERY     • UPPER GASTROINTESTINAL ENDOSCOPY  06/27/2019            Family History   Problem Relation Age of Onset   • Breast cancer Daughter    • Other Daughter         anaphalaxysis   • Cancer  Daughter    • Cancer Daughter             Social History     Socioeconomic History   • Marital status:    • Number of children: 13   Tobacco Use   • Smoking status: Some Days     Types: Cigarettes   • Smokeless tobacco: Never   • Tobacco comments:     3 cig. A day   Vaping Use   • Vaping Use: Never used   Substance and Sexual Activity   • Alcohol use: Not Currently   • Drug use: No   • Sexual activity: Not Currently            Allergies   Allergen Reactions   • Ciprofloxacin Other (See Comments)     neck pain; lips and eyes swelled   • Other      ALL DRUGS IN THE FLUOROQUINOLONES            Current Outpatient Medications on File Prior to Visit   Medication Sig Dispense Refill   • busPIRone (BUSPAR) 5 MG tablet Take 1 tablet by mouth 2 (Two) Times a Day As Needed (anxiety). 30 tablet 1   • calcitriol (ROCALTROL) 0.5 MCG capsule Take 1 capsule by mouth 2 (Two) Times a Day. 180 capsule 3   • cholecalciferol (VITAMIN D3) 1000 UNITS tablet Take 2 tablets by mouth Daily. HOLD PRIOR TO SURG     • cyanocobalamin 1000 MCG/ML injection Inject 1 mL into the appropriate muscle as directed by prescriber Every 30 (Thirty) Days. 3 mL 3   • dilTIAZem CD (CARDIZEM CD) 120 MG 24 hr capsule Take 1 capsule by mouth Daily. 90 capsule 3   • doxycycline (VIBRAMYCIN) 100 MG capsule Take 1 capsule by mouth 2 (Two) Times a Day. 180 capsule 3   • Melatonin 10 MG tablet Take 1 tablet by mouth. PATIENT TAKES ONE EVERY EVENING/UNSURE IF TABLET OR CAPSULE     • Multiple Vitamins-Minerals (ICAPS AREDS 2 PO) Take 1 capsule by mouth 2 (Two) Times a Day.     • potassium chloride (KLOR-CON) 20 MEQ CR tablet Take 1 tablet by mouth Daily. 180 tablet 3   • Pradaxa 150 MG capsu TAKE 1 CAPSULE TWICE DAILY 180 capsule 1   • pravastatin (PRAVACHOL) 80 MG tablet Take 1 tablet by mouth Every Night. 90 tablet 3   • raloxifene (EVISTA) 60 MG tablet Take 1 tablet by mouth Daily. 90 tablet 3   • sertraline (ZOLOFT) 100 MG tablet Take 1 tablet by mouth  "Daily. 90 tablet 3   • Synthroid 137 MCG tablet Take 1 tablet by mouth Daily. Brand-name only. 90 tablet 3   • timolol (TIMOPTIC) 0.5 % ophthalmic solution Administer 1 drop to both eyes 2 (Two) Times a Day.     • colestipol (COLESTID) 1 g tablet Take 1 tablet by mouth Daily. (Patient not taking: Reported on 4/21/2023) 30 tablet 0   • [DISCONTINUED] pantoprazole (PROTONIX) 40 MG EC tablet Take 1 tablet by mouth Daily. (Patient not taking: Reported on 4/21/2023) 30 tablet 0     No current facility-administered medications on file prior to visit.            /88 (BP Location: Left arm, Patient Position: Sitting, Cuff Size: Adult)   Pulse 61   Temp 98.2 °F (36.8 °C) (Oral)   Ht 170.2 cm (67.01\")   Wt 61.6 kg (135 lb 12.8 oz)   SpO2 98% Comment: room air  BMI 21.26 kg/m²          Objective     Physical Exam  Vitals and nursing note reviewed.   Constitutional:       General: She is not in acute distress.     Appearance: Normal appearance.   Pulmonary:      Effort: Pulmonary effort is normal. No respiratory distress.   Skin:     General: Skin is warm and dry.      Findings: Erythema and rash present. No bruising.   Neurological:      Mental Status: She is alert and oriented to person, place, and time.   Psychiatric:         Mood and Affect: Mood normal.         Behavior: Behavior normal.                      Procedures                    Lab Results (last 24 hours)     Procedure Component Value Units Date/Time    TSH+Free T4 [[id number]] Collected: 04/21/23 1602    Specimen: Blood Updated: 04/21/23 1603    Comprehensive metabolic panel [[id number]] Collected: 04/21/23 1602    Specimen: Blood Updated: 04/21/23 1603    CBC w AUTO Differential [[id number]]  (Abnormal) Collected: 04/21/23 1602    Specimen: Blood Updated: 04/21/23 1605    Narrative:      The following orders were created for panel order CBC w AUTO Differential.  Procedure                               Abnormality         Status                   "   ---------                               -----------         ------                     CBC Auto Differential[[id number]]        Abnormal            Final result                 Please view results for these tests on the individual orders.    Sedimentation rate, automated [[id number]] Collected: 04/21/23 1602    Specimen: Blood Updated: 04/21/23 1603    C-reactive protein [[id number]] Collected: 04/21/23 1602    Specimen: Blood Updated: 04/21/23 1603    CK [[id number]] Collected: 04/21/23 1602    Specimen: Blood Updated: 04/21/23 1603    CBC Auto Differential [[id number]]  (Abnormal) Collected: 04/21/23 1602    Specimen: Blood Updated: 04/21/23 1605     WBC 7.24 10*3/mm3      RBC 4.45 10*6/mm3      Hemoglobin 12.4 g/dL      Hematocrit 39.9 %      MCV 89.7 fL      MCH 27.9 pg      MCHC 31.1 g/dL      RDW 15.5 %      RDW-SD 51.3 fl      MPV 10.4 fL      Platelets 157 10*3/mm3      Neutrophil % 60.3 %      Lymphocyte % 28.7 %      Monocyte % 9.3 %      Eosinophil % 1.5 %      Basophil % 0.1 %      Immature Grans % 0.1 %      Neutrophils, Absolute 4.36 10*3/mm3      Lymphocytes, Absolute 2.08 10*3/mm3      Monocytes, Absolute 0.67 10*3/mm3      Eosinophils, Absolute 0.11 10*3/mm3      Basophils, Absolute 0.01 10*3/mm3      Immature Grans, Absolute 0.01 10*3/mm3                 No Radiology Exams Resulted Within Past 24 Hours               Discussed Trinity's presentation with Dr. Pradhan, the on-call physician today.  He voiced concern about dermatomyositis and recommended checking the lab work as below.  We will also trial antihistamines to help with her itching.  Follow-up with dermatology as scheduled.  Contact office if symptoms worsen.    Diagnoses and all orders for this visit:    1. Dermatitis (Primary)  -     CBC w AUTO Differential; Future  -     Sedimentation rate, automated; Future  -     C-reactive protein; Future  -     CK; Future  -     diphenhydrAMINE (Benadryl Allergy) 25 MG tablet; Take 1 tablet by mouth Every  6 (Six) Hours As Needed for Itching for up to 14 days.  Dispense: 56 tablet; Refill: 0  -     famotidine (Pepcid) 20 MG tablet; Take 1 tablet by mouth 2 (Two) Times a Day for 14 days.  Dispense: 28 tablet; Refill: 0    2. Abrasion  -     CBC w AUTO Differential; Future  -     mupirocin (BACTROBAN) 2 % ointment; Apply 1 application topically to the appropriate area as directed 2 (Two) Times a Day.  Dispense: 15 g; Refill: 0             Additional Instructions for the Follow-ups that You Need to Schedule     C-reactive protein    Apr 26, 2023 (Approximate)      Release to patient: Routine Release         CK    Apr 26, 2023 (Approximate)      Release to patient: Routine Release         Sedimentation rate, automated    Apr 26, 2023 (Approximate)      Release to patient: Routine Release                         FOR FULL DISCHARGE INSTRUCTIONS/COMMENTS/HANDOUTS please see the   AVS

## 2023-04-22 LAB
ALBUMIN SERPL-MCNC: 3.1 G/DL (ref 3.5–5.2)
ALBUMIN/GLOB SERPL: 1.3 G/DL
ALP SERPL-CCNC: 68 U/L (ref 39–117)
ALT SERPL W P-5'-P-CCNC: 19 U/L (ref 1–33)
ANION GAP SERPL CALCULATED.3IONS-SCNC: 7.4 MMOL/L (ref 5–15)
AST SERPL-CCNC: 23 U/L (ref 1–32)
BILIRUB SERPL-MCNC: 0.5 MG/DL (ref 0–1.2)
BUN SERPL-MCNC: 22 MG/DL (ref 8–23)
BUN/CREAT SERPL: 27.2 (ref 7–25)
CALCIUM SPEC-SCNC: 9.8 MG/DL (ref 8.6–10.5)
CHLORIDE SERPL-SCNC: 107 MMOL/L (ref 98–107)
CK SERPL-CCNC: 15 U/L (ref 20–180)
CO2 SERPL-SCNC: 27.6 MMOL/L (ref 22–29)
CREAT SERPL-MCNC: 0.81 MG/DL (ref 0.57–1)
CRP SERPL-MCNC: <0.3 MG/DL (ref 0–0.5)
EGFRCR SERPLBLD CKD-EPI 2021: 69.9 ML/MIN/1.73
ERYTHROCYTE [SEDIMENTATION RATE] IN BLOOD: <1 MM/HR (ref 0–30)
GLOBULIN UR ELPH-MCNC: 2.3 GM/DL
GLUCOSE SERPL-MCNC: 94 MG/DL (ref 65–99)
POTASSIUM SERPL-SCNC: 4.5 MMOL/L (ref 3.5–5.2)
PROT SERPL-MCNC: 5.4 G/DL (ref 6–8.5)
SODIUM SERPL-SCNC: 142 MMOL/L (ref 136–145)
T4 FREE SERPL-MCNC: 1.1 NG/DL (ref 0.93–1.7)
TSH SERPL DL<=0.05 MIU/L-ACNC: 2.81 UIU/ML (ref 0.27–4.2)

## 2023-04-26 ENCOUNTER — TELEPHONE (OUTPATIENT)
Dept: FAMILY MEDICINE CLINIC | Age: 88
End: 2023-04-26

## 2023-04-26 DIAGNOSIS — R60.0 LOCALIZED EDEMA: Primary | ICD-10-CM

## 2023-04-26 RX ORDER — FUROSEMIDE 20 MG/1
20 TABLET ORAL DAILY
Qty: 30 TABLET | Refills: 1 | Status: SHIPPED | OUTPATIENT
Start: 2023-04-26

## 2023-04-26 NOTE — TELEPHONE ENCOUNTER
Noted.  I have sent a prescription for generic Lasix to [facility].  The prescription will say to take 1 tablet daily.  However, I would recommend she take 2 tablets today and then move forward with 1 daily for the next few days.  I would recommend seeing her back on Friday or Monday to reassess.  Thanks.

## 2023-04-26 NOTE — TELEPHONE ENCOUNTER
Caller: BobyGinaAimee    Relationship: Emergency Contact    Best call back number: [phone]    What is the best time to reach you: ANYTIME    Who are you requesting to speak with (clinical staff, provider,  specific staff member): CLINICAL      What was the call regarding: THE PATIENT'S DAUGHTER STATED THE PATIENT WAS SEEN BY WALT DUONG LAST FRIDAY. SHE STATED THE SWELLING IN HER FEET IS GETTING WORSE. SHE'S DRINKING PLENTY BUT IS NOT URINATING MUCH. SHE STATED FLUID IS STARTING TO SEEP FROM HER LEGS WHERE THE RASH WAS. SHE WOULD LIKE A CALL BACK TO DISCUSS PCP RECOMMENDATION AND IF PATIENT SHOULD BE ON MEDICATION FOR THIS.     [facility] - [place], [place] - [address] [facility] [address]  - [phone]  - [phone]   [phone]    Do you require a callback: YES

## 2023-05-01 ENCOUNTER — OFFICE VISIT (OUTPATIENT)
Dept: FAMILY MEDICINE CLINIC | Age: 88
End: 2023-05-01
Payer: MEDICARE

## 2023-05-01 VITALS
HEIGHT: 67 IN | HEART RATE: 71 BPM | WEIGHT: 136.2 LBS | TEMPERATURE: 98.1 F | SYSTOLIC BLOOD PRESSURE: 142 MMHG | DIASTOLIC BLOOD PRESSURE: 72 MMHG | BODY MASS INDEX: 21.38 KG/M2

## 2023-05-01 DIAGNOSIS — F03.918 DEMENTIA WITH BEHAVIORAL DISTURBANCE: ICD-10-CM

## 2023-05-01 DIAGNOSIS — R60.0 LOCALIZED EDEMA: Primary | ICD-10-CM

## 2023-05-01 PROCEDURE — 1160F RVW MEDS BY RX/DR IN RCRD: CPT | Performed by: FAMILY MEDICINE

## 2023-05-01 PROCEDURE — 99213 OFFICE O/P EST LOW 20 MIN: CPT | Performed by: FAMILY MEDICINE

## 2023-05-01 PROCEDURE — 1159F MED LIST DOCD IN RCRD: CPT | Performed by: FAMILY MEDICINE

## 2023-05-01 RX ORDER — DIPHENHYDRAMINE HYDROCHLORIDE 25 MG/1
CAPSULE ORAL
COMMUNITY
Start: 2023-04-21

## 2023-05-01 RX ORDER — BUSPIRONE HYDROCHLORIDE 5 MG/1
5 TABLET ORAL 2 TIMES DAILY PRN
Qty: 30 TABLET | Refills: 1 | Status: SHIPPED | OUTPATIENT
Start: 2023-05-01

## 2023-05-01 NOTE — PROGRESS NOTES
Becky Zavala presents to [facility].    Chief Complaint:  Follow up on edema    Subjective       History of Present Illness:  Trinity is here today for follow-up on her care.  She has had some lower extremity edema for the last 3 to 4 weeks.  It seemed like a rash started on the neck area prior to this.  The swelling got bad enough last week that her daughter called to see about some medication to help with it.  Trinity was having some weeping of fluid in the lower extremities.  She was prescribed furosemide and has been taking that for the last few days.  The medication has been of some benefit.    Review of Systems:  Review of Systems   Constitutional: Negative for chills and fever.   Respiratory: Negative for cough and shortness of breath.    Cardiovascular: Negative for chest pain and palpitations.   Gastrointestinal: Negative for abdominal pain, nausea and vomiting.        Objective   Medical History:  Past Medical History:   • AAA (abdominal aortic aneurysm)   • Alzheimer's disease, unspecified   • Ankle sprain   • Anxiety   • Atrial fibrillation   • Cataract   • Cellulitis   • Chronic atrial fibrillation   • Chronic kidney disease   • COPD (chronic obstructive pulmonary disease)   • Coronary artery disease   • Dementia    SLIGHT PER DAUGHTER   • Disease of thyroid gland   • Diverticulitis of colon   • Essential (primary) hypertension   • Fracture, fibula   • Frequency of micturition   • Glaucoma   • Groin incision ulcer    SMALL OPEN AREA TO LEFT GROIN   • Hemorrhage of anus and rectum   • History of heart attack   • HL (hearing loss)   • Hyperlipidemia   • Hypocalcemia   • Hypothyroidism   • Macular degeneration   • MSSA (methicillin susceptible Staphylococcus aureus) infection    CAUSE OF ANEURSYM PER DAUGHTER STATED   • Myocardial infarction   • Neck pain    LOWER    • Neoplasm of uncertain behavior of skin   • Neuropathy   • Osteopenia   • Osteoporosis   • Other  hypoparathyroidism   • Other vitamin B12 deficiency anemias   • PONV (postoperative nausea and vomiting)   • Popliteal aneurysm    LEFT   • Pressure sore    LEFT HEEL DRESSING INTACT   • PVD (peripheral vascular disease)   • UTI (urinary tract infection)   • Visual impairment     Past Surgical History:   • APPENDECTOMY   • CHOLECYSTECTOMY   • COLONOSCOPY   • COLONOSCOPY    NBIH, diverticulosis, multiple polyps benign per path    • ENDOSCOPY    normal    • EYE SURGERY   • FRACTURE SURGERY   • HYSTERECTOMY   • ORIF FEMUR FRACTURE    left tib fib   • OH DIR RPR ANEURYSM & GRAFT COMMON FEMORAL ARTERY    Procedure: REPAIR OF LEFT MYCOTIC POPLITEAL ANEURYSM WITH ARTEGRAFT GRAFT, INTERPOSITION;  Surgeon: Randy Ott MD;  Location: [facility];  Service: Vascular   • OH IN-SITU VEIN BYPASS FEMORAL-POPLITEAL    Procedure: RESECTION OF INFECTED LEFT FEMORAL ANEURYSM, EXTERNAL ILIAC TO SFA BYPASS WITH REIMPLANTATION PROFUNDA, AIF ARTERIOGRAM WITH LEFT LEG RUNOFF, SARTORIUS MUSCLE FLAP;  Surgeon: Randy Ott MD;  Location: [facility];  Service: Vascular   • THYROID SURGERY   • UPPER GASTROINTESTINAL ENDOSCOPY      Family History   Problem Relation Age of Onset   • Breast cancer Daughter    • Other Daughter         anaphalaxysis   • Cancer Daughter    • Cancer Daughter      Social History     Tobacco Use   • Smoking status: Former     Types: Cigarettes   • Smokeless tobacco: Never   • Tobacco comments:     3 cig. A day   Substance Use Topics   • Alcohol use: Not Currently       Health Maintenance Due   Topic Date Due   • DXA SCAN  07/13/2014        Immunization History   Administered Date(s) Administered   • COVID-19 (PFIZER) BIVALENT 12+YRS 12/16/2022   • COVID-19 (PFIZER) Purple Cap Monovalent 03/22/2021, 04/12/2021, 11/11/2021   • Flu Vaccine Intradermal Quad 18-64YR 01/23/2013   • Fluzone High Dose =>65 Years ([employer] ONLY) 11/17/2017   • Fluzone High-Dose 65+yrs 12/16/2022   • Fluzone Quad >6mos (Multi-dose)  11/18/2016   • Influenza, Unspecified 05/24/2021   • Pneumococcal Conjugate 13-Valent (PCV13) 03/16/2015   • Pneumococcal Polysaccharide (PPSV23) 11/09/2010   • Shingrix 01/21/2022, 04/19/2022   • Td 07/08/2018   • Tdap 07/08/2018, 07/08/2018       Allergies   Allergen Reactions   • Ciprofloxacin Other (See Comments)     neck pain; lips and eyes swelled   • Other      ALL DRUGS IN THE FLUOROQUINOLONES        Medications:  Current Outpatient Medications on File Prior to Visit   Medication Sig   • Banophen 25 MG capsule TAKE ONE CAPSULE BY MOUTH EVERY 6 HOURS AS NEEDED FOR ITCHING   • calcitriol (ROCALTROL) 0.5 MCG capsule Take 1 capsule by mouth 2 (Two) Times a Day.   • cholecalciferol (VITAMIN D3) 1000 UNITS tablet Take 2 tablets by mouth Daily. HOLD PRIOR TO SURG   • cyanocobalamin 1000 MCG/ML injection Inject 1 mL into the appropriate muscle as directed by prescriber Every 30 (Thirty) Days.   • dilTIAZem CD (CARDIZEM CD) 120 MG 24 hr capsule Take 1 capsule by mouth Daily.   • diphenhydrAMINE (Benadryl Allergy) 25 MG tablet Take 1 tablet by mouth Every 6 (Six) Hours As Needed for Itching for up to 14 days.   • doxycycline (VIBRAMYCIN) 100 MG capsule Take 1 capsule by mouth 2 (Two) Times a Day.   • famotidine (Pepcid) 20 MG tablet Take 1 tablet by mouth 2 (Two) Times a Day for 14 days.   • furosemide (LASIX) 20 MG tablet Take 1 tablet by mouth Daily.   • Melatonin 10 MG tablet Take 1 tablet by mouth. PATIENT TAKES ONE EVERY EVENING/UNSURE IF TABLET OR CAPSULE   • Multiple Vitamins-Minerals (ICAPS AREDS 2 PO) Take 1 capsule by mouth 2 (Two) Times a Day.   • mupirocin (BACTROBAN) 2 % ointment Apply 1 application topically to the appropriate area as directed 2 (Two) Times a Day.   • potassium chloride (KLOR-CON) 20 MEQ CR tablet Take 1 tablet by mouth Daily.   • Pradaxa 150 MG capsu TAKE 1 CAPSULE TWICE DAILY   • pravastatin (PRAVACHOL) 80 MG tablet Take 1 tablet by mouth Every Night.   • raloxifene (EVISTA) 60 MG  "tablet Take 1 tablet by mouth Daily.   • sertraline (ZOLOFT) 100 MG tablet Take 1 tablet by mouth Daily.   • Synthroid 137 MCG tablet Take 1 tablet by mouth Daily. Brand-name only.   • timolol (TIMOPTIC) 0.5 % ophthalmic solution Administer 1 drop to both eyes 2 (Two) Times a Day.   • [DISCONTINUED] busPIRone (BUSPAR) 5 MG tablet Take 1 tablet by mouth 2 (Two) Times a Day As Needed (anxiety).   • [DISCONTINUED] colestipol (COLESTID) 1 g tablet Take 1 tablet by mouth Daily. (Patient not taking: Reported on 4/21/2023)     No current facility-administered medications on file prior to visit.       Vital Signs:   Vitals:    05/01/23 1543 05/01/23 1613   BP: 153/68 142/72   BP Location: Right arm Right arm   Patient Position: Sitting Sitting   Pulse: 65 71   Temp: 98.1 °F (36.7 °C)    TempSrc: Oral    Weight: 61.8 kg (136 lb 3.2 oz)    Height: 170.2 cm (67.01\")    Body mass index is 21.33 kg/m².    Physical Exam:  Physical Exam  Vitals reviewed.   Constitutional:       General: She is not in acute distress.     Appearance: She is not ill-appearing.   Eyes:      Pupils: Pupils are equal, round, and reactive to light.   Neck:      Comments: No thyromegaly  Cardiovascular:      Rate and Rhythm: Normal rate and regular rhythm.   Pulmonary:      Effort: Pulmonary effort is normal.      Breath sounds: Normal breath sounds.   Abdominal:      General: There is no distension.      Palpations: Abdomen is soft.      Tenderness: There is no abdominal tenderness.   Musculoskeletal:      Cervical back: Neck supple.      Right lower leg: No edema.      Left lower leg: No edema.   Lymphadenopathy:      Cervical: No cervical adenopathy.   Skin:     Findings: Rash (There is mild redness of the right lateral lower leg.  It extends down the lateral leg.  There are 2 small areas of excoriation noted.  There is a smaller area of redness of the left lower extremity medially.) present. No lesion.   Neurological:      Mental Status: She is alert. "         Result Review      The following data was reviewed by Surjit Gutierrez MD on 05/01/2023.  Lab Results   Component Value Date    WBC 7.24 04/21/2023    HGB 12.4 04/21/2023    HCT 39.9 04/21/2023    MCV 89.7 04/21/2023     04/21/2023     Lab Results   Component Value Date    GLUCOSE 94 04/21/2023    BUN 22 04/21/2023    CREATININE 0.81 04/21/2023     04/21/2023    K 4.5 04/21/2023     04/21/2023    CO2 27.6 04/21/2023    CALCIUM 9.8 04/21/2023    PROTEINTOT 5.4 (L) 04/21/2023    ALBUMIN 3.1 (L) 04/21/2023    ALT 19 04/21/2023    AST 23 04/21/2023    ALKPHOS 68 04/21/2023    BILITOT 0.5 04/21/2023    EGFR 69.9 04/21/2023    GLOB 2.3 04/21/2023    AGRATIO 1.3 04/21/2023    BCR 27.2 (H) 04/21/2023    ANIONGAP 7.4 04/21/2023      Lab Results   Component Value Date    CHOL 142 06/17/2022    CHLPL 159 05/24/2021    TRIG 138 06/17/2022    HDL 34 (L) 06/17/2022    LDL 83 06/17/2022     Lab Results   Component Value Date    TSH 2.810 04/21/2023     Lab Results   Component Value Date    HGBA1C 5.70 (H) 12/16/2022            Assessment and Plan:   Today, we have reviewed Trinity's care.  She has already been prescribed some antibiotic for the right lower extremity by dermatology whom she saw earlier today.  Regarding the swelling, it is significantly better with use of furosemide over the last few days.  I have asked him to go ahead and keep the bottle and use it more as needed.  If we find she has a recurring issue with swelling, then we could use it on a more regular basis, but I would prefer to avoid that if possible.  Her blood pressure is mildly elevated, and we will recheck it before she leaves.  Otherwise, no short-term change is anticipated.  We will plan to see her back in June as scheduled.       Diagnoses and all orders for this visit:    1. Localized edema (Primary)      Follow Up   Return in about 7 weeks (around 6/19/2023) for Recheck as scheduled.  Patient was given instructions and  counseling regarding her condition or for health maintenance advice. Please see specific information pulled into the AVS if appropriate.

## 2023-05-30 DIAGNOSIS — F03.918 DEMENTIA WITH BEHAVIORAL DISTURBANCE: ICD-10-CM

## 2023-05-30 RX ORDER — BUSPIRONE HYDROCHLORIDE 5 MG/1
5 TABLET ORAL 2 TIMES DAILY PRN
Qty: 30 TABLET | Refills: 1 | Status: SHIPPED | OUTPATIENT
Start: 2023-05-30

## 2023-06-02 ENCOUNTER — OFFICE VISIT (OUTPATIENT)
Dept: FAMILY MEDICINE CLINIC | Age: 88
End: 2023-06-02

## 2023-06-02 ENCOUNTER — HOSPITAL ENCOUNTER (OUTPATIENT)
Dept: GENERAL RADIOLOGY | Facility: HOSPITAL | Age: 88
Discharge: HOME | End: 2023-06-02

## 2023-06-02 VITALS
WEIGHT: 140.4 LBS | OXYGEN SATURATION: 98 % | HEIGHT: 67 IN | DIASTOLIC BLOOD PRESSURE: 86 MMHG | HEART RATE: 90 BPM | TEMPERATURE: 98.2 F | BODY MASS INDEX: 22.03 KG/M2 | SYSTOLIC BLOOD PRESSURE: 138 MMHG

## 2023-06-02 DIAGNOSIS — M25.571 ACUTE RIGHT ANKLE PAIN: ICD-10-CM

## 2023-06-02 DIAGNOSIS — S92.354A CLOSED NONDISPLACED FRACTURE OF FIFTH METATARSAL BONE OF RIGHT FOOT, INITIAL ENCOUNTER: Primary | ICD-10-CM

## 2023-06-02 DIAGNOSIS — M79.671 RIGHT FOOT PAIN: ICD-10-CM

## 2023-06-02 PROCEDURE — 73630 X-RAY EXAM OF FOOT: CPT

## 2023-06-02 PROCEDURE — 73610 X-RAY EXAM OF ANKLE: CPT

## 2023-06-02 RX ORDER — TRIAMCINOLONE ACETONIDE 1 MG/G
CREAM TOPICAL
COMMUNITY
Start: 2023-05-30

## 2023-06-02 NOTE — PROGRESS NOTES
Diagnoses and all orders for this visit:    1. Closed nondisplaced fracture of fifth metatarsal bone of right foot, initial encounter (Primary)  Comments:  Placed in walking boot at visit.  Will refer to podiatry for further management.  Orders:  -     Ambulatory Referral to Podiatry    2. Right foot pain  -     XR Foot 3+ View Right; Future    3. Acute right ankle pain  -     XR Ankle 3+ View Right; Future            Subjective     CHIEF COMPLAINT    Chief Complaint   Patient presents with   • Foot Pain     (R) x 1 week. Swelling around the ankle and bruising.             History of Present Illness  This is an 88-year-old female presenting to the clinic accompanied by her daughter with complaint of right foot pain, swelling, and bruising for the last week.  They are unsure if she had an injury.  She has been taking Tylenol and applying ice with minimal improvement of her symptoms.            Review of Systems   Constitutional: Negative for chills and fever.   Musculoskeletal: Positive for arthralgias and joint swelling. Negative for gait problem and myalgias.   Skin: Negative for wound.   Neurological: Negative for weakness and numbness.            Past Medical History:   Diagnosis Date   • AAA (abdominal aortic aneurysm)    • Alzheimer's disease, unspecified    • Ankle sprain June 2022   • Anxiety    • Atrial fibrillation    • Cataract    • Cellulitis    • Chronic atrial fibrillation    • Chronic kidney disease    • COPD (chronic obstructive pulmonary disease)    • Coronary artery disease    • Dementia     SLIGHT PER DAUGHTER   • Disease of thyroid gland    • Diverticulitis of colon 2008   • Essential (primary) hypertension    • Fracture, fibula 2016   • Frequency of micturition    • Glaucoma    • Groin incision ulcer     SMALL OPEN AREA TO LEFT GROIN   • Hemorrhage of anus and rectum    • History of heart attack    • HL (hearing loss)    • Hyperlipidemia    • Hypocalcemia    • Hypothyroidism    • Macular  degeneration    • MSSA (methicillin susceptible Staphylococcus aureus) infection     CAUSE OF ANEURSYM PER DAUGHTER STATED   • Myocardial infarction    • Neck pain     LOWER    • Neoplasm of uncertain behavior of skin    • Neuropathy    • Osteopenia    • Osteoporosis    • Other hypoparathyroidism    • Other vitamin B12 deficiency anemias    • PONV (postoperative nausea and vomiting)    • Popliteal aneurysm     LEFT   • Pressure sore     LEFT HEEL DRESSING INTACT   • PVD (peripheral vascular disease)    • UTI (urinary tract infection)    • Visual impairment             Past Surgical History:   Procedure Laterality Date   • APPENDECTOMY     • CHOLECYSTECTOMY     • COLONOSCOPY     • COLONOSCOPY N/A 06/27/2019    NBIH, diverticulosis, multiple polyps benign per path    • ENDOSCOPY N/A 06/26/2019    normal    • EYE SURGERY     • FRACTURE SURGERY     • HYSTERECTOMY     • ORIF FEMUR FRACTURE  02/18/2016    left tib fib   • DE DIR RPR ANEURYSM & GRAFT COMMON FEMORAL ARTERY Left 12/09/2016    Procedure: REPAIR OF LEFT MYCOTIC POPLITEAL ANEURYSM WITH ARTEGRAFT GRAFT, INTERPOSITION;  Surgeon: Randy Ott MD;  Location: [facility];  Service: Vascular   • DE IN-SITU VEIN BYPASS FEMORAL-POPLITEAL Left 10/07/2016    Procedure: RESECTION OF INFECTED LEFT FEMORAL ANEURYSM, EXTERNAL ILIAC TO SFA BYPASS WITH REIMPLANTATION PROFUNDA, AIF ARTERIOGRAM WITH LEFT LEG RUNOFF, SARTORIUS MUSCLE FLAP;  Surgeon: Randy Ott MD;  Location: [facility];  Service: Vascular   • THYROID SURGERY     • UPPER GASTROINTESTINAL ENDOSCOPY  06/27/2019            Family History   Problem Relation Age of Onset   • Breast cancer Daughter    • Other Daughter         anaphalaxysis   • Cancer Daughter    • Cancer Daughter             Social History     Socioeconomic History   • Marital status:    • Number of children: 13   Tobacco Use   • Smoking status: Never   • Smokeless tobacco: Never   • Tobacco comments:     3 cig. A day   Vaping Use    • Vaping Use: Never used   Substance and Sexual Activity   • Alcohol use: Not Currently   • Drug use: No   • Sexual activity: Not Currently            Allergies   Allergen Reactions   • Ciprofloxacin Other (See Comments)     neck pain; lips and eyes swelled   • Other      ALL DRUGS IN THE FLUOROQUINOLONES            Current Outpatient Medications on File Prior to Visit   Medication Sig Dispense Refill   • busPIRone (BUSPAR) 5 MG tablet Take 1 tablet by mouth 2 (Two) Times a Day As Needed (anxiety). for anxiety 30 tablet 1   • calcitriol (ROCALTROL) 0.5 MCG capsule Take 1 capsule by mouth 2 (Two) Times a Day. 180 capsule 3   • cholecalciferol (VITAMIN D3) 1000 UNITS tablet Take 2 tablets by mouth Daily. HOLD PRIOR TO SURG     • cyanocobalamin 1000 MCG/ML injection Inject 1 mL into the appropriate muscle as directed by prescriber Every 30 (Thirty) Days. 3 mL 3   • dilTIAZem CD (CARDIZEM CD) 120 MG 24 hr capsule Take 1 capsule by mouth Daily. 90 capsule 3   • doxycycline (VIBRAMYCIN) 100 MG capsule Take 1 capsule by mouth 2 (Two) Times a Day. 180 capsule 3   • Melatonin 10 MG tablet Take 1 tablet by mouth. PATIENT TAKES ONE EVERY EVENING/UNSURE IF TABLET OR CAPSULE     • Multiple Vitamins-Minerals (ICAPS AREDS 2 PO) Take 1 capsule by mouth 2 (Two) Times a Day.     • potassium chloride (KLOR-CON) 20 MEQ CR tablet Take 1 tablet by mouth Daily. 180 tablet 3   • Pradaxa 150 MG capsu TAKE 1 CAPSULE TWICE DAILY 180 capsule 1   • pravastatin (PRAVACHOL) 80 MG tablet Take 1 tablet by mouth Every Night. 90 tablet 3   • raloxifene (EVISTA) 60 MG tablet Take 1 tablet by mouth Daily. 90 tablet 3   • sertraline (ZOLOFT) 100 MG tablet Take 1 tablet by mouth Daily. 90 tablet 3   • Synthroid 137 MCG tablet Take 1 tablet by mouth Daily. Brand-name only. 90 tablet 3   • timolol (TIMOPTIC) 0.5 % ophthalmic solution Administer 1 drop to both eyes 2 (Two) Times a Day.     • triamcinolone (KENALOG) 0.1 % cream      • [DISCONTINUED]  "Banophen 25 MG capsule TAKE ONE CAPSULE BY MOUTH EVERY 6 HOURS AS NEEDED FOR ITCHING     • [DISCONTINUED] mupirocin (BACTROBAN) 2 % ointment Apply 1 application topically to the appropriate area as directed 2 (Two) Times a Day. 15 g 0   • furosemide (LASIX) 20 MG tablet Take 1 tablet by mouth Daily. (Patient not taking: Reported on 6/2/2023) 30 tablet 1     No current facility-administered medications on file prior to visit.            /86 (BP Location: Right arm, Patient Position: Sitting, Cuff Size: Small Adult)   Pulse 90   Temp 98.2 °F (36.8 °C) (Oral)   Ht 170.2 cm (67.01\")   Wt 63.7 kg (140 lb 6.4 oz)   SpO2 98% Comment: room air  BMI 21.98 kg/m²          Objective     Physical Exam  Vitals and nursing note reviewed.   Constitutional:       General: She is not in acute distress.     Appearance: Normal appearance.   Pulmonary:      Effort: Pulmonary effort is normal. No respiratory distress.   Musculoskeletal:      Right ankle: Swelling and ecchymosis present. No tenderness. Normal range of motion. Normal pulse.      Right foot: Normal capillary refill. Swelling and tenderness (5th metatarsal) present. Normal pulse.   Skin:     General: Skin is warm and dry.      Findings: Bruising present. No erythema.   Neurological:      Mental Status: She is alert and oriented to person, place, and time.   Psychiatric:         Mood and Affect: Mood normal.         Behavior: Behavior normal.              Procedures                    Lab Results (last 24 hours)     ** No results found for the last 24 hours. **                XR Ankle 3+ View Right    Result Date: 6/2/2023  PROCEDURE: XR ANKLE 3+ VW RIGHT  COMPARISON: [facility] GUY LOTT, ANKLE >OR= 3V RT, 3/20/2018, 15:24.  INDICATIONS: GENERAL RIGHT ANKLE PAIN AFTER FALL X 1 WEEK  FINDINGS:  There is no evidence for displaced fracture or dislocation. The ankle mortise is intact. No focal osseous abnormalities are seen. The soft tissues are " unremarkable.        1. No evidence for displaced fracture or dislocation. The ankle mortise is intact.      SHRADDHA EDWARDS MD       Electronically Signed and Approved By: SHRADDHA EDWARDS MD on 6/02/2023 at 16:17             XR Foot 3+ View Right    Result Date: 6/2/2023  PROCEDURE: XR FOOT 3+ VW RIGHT  COMPARISON: [facility], XR FOOT 3+ VW RIGHT, 3/02/2023, 17:57.  INDICATIONS: GENERAL RIGHT FOOT PAIN AFTER FALL X 1 WEEK  FINDINGS:  There is a nondisplaced transverse fracture involving the proximal head of the 5th metatarsal bone.  The articulations remain intact without dislocation.  Nonspecific soft tissue swelling is observed.  Vascular calcifications are noted.        1. Nondisplaced transverse fracture involving proximal head of the 5th metatarsal bone.      SHRADDHA EDWARDS MD       Electronically Signed and Approved By: SHRADDHA EDWARDS MD on 6/02/2023 at 16:17                               Diagnoses and all orders for this visit:    1. Closed nondisplaced fracture of fifth metatarsal bone of right foot, initial encounter (Primary)  Comments:  Placed in walking boot at visit.  Will refer to podiatry for further management.  Orders:  -     Ambulatory Referral to Podiatry    2. Right foot pain  -     XR Foot 3+ View Right; Future    3. Acute right ankle pain  -     XR Ankle 3+ View Right; Future                           FOR FULL DISCHARGE INSTRUCTIONS/COMMENTS/HANDOUTS please see the   AVS

## 2023-06-06 ENCOUNTER — TELEPHONE (OUTPATIENT)
Dept: FAMILY MEDICINE CLINIC | Age: 88
End: 2023-06-06

## 2023-06-06 DIAGNOSIS — F03.918 DEMENTIA WITH BEHAVIORAL DISTURBANCE: ICD-10-CM

## 2023-06-06 RX ORDER — BUSPIRONE HYDROCHLORIDE 10 MG/1
10 TABLET ORAL 2 TIMES DAILY PRN
Qty: 40 TABLET | Refills: 1 | Status: SHIPPED | OUTPATIENT
Start: 2023-06-06 | End: 2023-06-19

## 2023-06-06 NOTE — TELEPHONE ENCOUNTER
I have sent the 10 mg dose of buspirone as a trial.  If this is not helpful, we may want to consider other options.  Thanks.

## 2023-06-06 NOTE — TELEPHONE ENCOUNTER
Caller: Aimee Landis    Relationship: Emergency Contact    Best call back number: [phone]     What form or medical record are you requesting: XRAY OF FOOT    Who is requesting this form or medical record from you: DAUGHTER    How would you like to receive the form or medical records (pick-up, mail, fax): FAX  If fax, what is the fax number: [phone]    Timeframe paperwork needed: ASAP    Additional notes: PATIENT NEEDS HER XRAY RESULTS SENT TO [facility]

## 2023-06-06 NOTE — TELEPHONE ENCOUNTER
Daughter inf we will fax records, but if they are wanting disc, they will need to contact [facility], # given. Pts daughter also states pt has been doing a lot of picking at her skin mostly on her hands causing it to bleed due to anxiety. Wondering if you think increasing dose on Buspar is appropriate. Please advise.

## 2023-06-19 ENCOUNTER — OFFICE VISIT (OUTPATIENT)
Dept: FAMILY MEDICINE CLINIC | Age: 88
End: 2023-06-19
Payer: MEDICARE

## 2023-06-19 VITALS
TEMPERATURE: 98.3 F | WEIGHT: 145 LBS | HEART RATE: 70 BPM | DIASTOLIC BLOOD PRESSURE: 82 MMHG | HEIGHT: 67 IN | SYSTOLIC BLOOD PRESSURE: 160 MMHG | BODY MASS INDEX: 22.76 KG/M2

## 2023-06-19 DIAGNOSIS — E03.9 ACQUIRED HYPOTHYROIDISM: ICD-10-CM

## 2023-06-19 DIAGNOSIS — F03.918 DEMENTIA WITH BEHAVIORAL DISTURBANCE: ICD-10-CM

## 2023-06-19 DIAGNOSIS — I48.0 PAROXYSMAL A-FIB: ICD-10-CM

## 2023-06-19 DIAGNOSIS — Z23 ENCOUNTER FOR IMMUNIZATION: ICD-10-CM

## 2023-06-19 DIAGNOSIS — I10 ESSENTIAL HYPERTENSION: ICD-10-CM

## 2023-06-19 DIAGNOSIS — Z00.00 PHYSICAL EXAM: Primary | ICD-10-CM

## 2023-06-19 DIAGNOSIS — E78.2 MIXED HYPERLIPIDEMIA: ICD-10-CM

## 2023-06-19 DIAGNOSIS — E20.9 HYPOPARATHYROIDISM, UNSPECIFIED HYPOPARATHYROIDISM TYPE: ICD-10-CM

## 2023-06-19 PROCEDURE — 1160F RVW MEDS BY RX/DR IN RCRD: CPT | Performed by: FAMILY MEDICINE

## 2023-06-19 PROCEDURE — G0439 PPPS, SUBSEQ VISIT: HCPCS | Performed by: FAMILY MEDICINE

## 2023-06-19 PROCEDURE — 91312 COVID-19 (PFIZER) BIVALENT 12+YRS: CPT | Performed by: FAMILY MEDICINE

## 2023-06-19 PROCEDURE — 0124A PR ADM SARSCOV2 30MCG/0.3ML BST: CPT | Performed by: FAMILY MEDICINE

## 2023-06-19 PROCEDURE — 1159F MED LIST DOCD IN RCRD: CPT | Performed by: FAMILY MEDICINE

## 2023-06-19 PROCEDURE — 1170F FXNL STATUS ASSESSED: CPT | Performed by: FAMILY MEDICINE

## 2023-06-19 PROCEDURE — 99214 OFFICE O/P EST MOD 30 MIN: CPT | Performed by: FAMILY MEDICINE

## 2023-06-19 RX ORDER — ERGOCALCIFEROL 1.25 MG/1
1 CAPSULE ORAL WEEKLY
COMMUNITY
Start: 2023-06-06

## 2023-06-19 RX ORDER — RALOXIFENE HYDROCHLORIDE 60 MG/1
60 TABLET, FILM COATED ORAL DAILY
Qty: 90 TABLET | Refills: 3 | Status: SHIPPED | OUTPATIENT
Start: 2023-06-19

## 2023-06-19 RX ORDER — PRAVASTATIN SODIUM 80 MG/1
80 TABLET ORAL NIGHTLY
Qty: 90 TABLET | Refills: 3 | Status: SHIPPED | OUTPATIENT
Start: 2023-06-19

## 2023-06-19 RX ORDER — DOXYCYCLINE HYCLATE 100 MG/1
100 CAPSULE ORAL 2 TIMES DAILY
Qty: 180 CAPSULE | Refills: 3 | Status: SHIPPED | OUTPATIENT
Start: 2023-06-19

## 2023-06-19 RX ORDER — SERTRALINE HYDROCHLORIDE 100 MG/1
100 TABLET, FILM COATED ORAL DAILY
Qty: 90 TABLET | Refills: 3 | Status: SHIPPED | OUTPATIENT
Start: 2023-06-19

## 2023-06-19 RX ORDER — CALCITRIOL 0.5 UG/1
0.5 CAPSULE, LIQUID FILLED ORAL 2 TIMES DAILY
Qty: 180 CAPSULE | Refills: 3 | Status: SHIPPED | OUTPATIENT
Start: 2023-06-19

## 2023-06-19 RX ORDER — GABAPENTIN 100 MG/1
100 CAPSULE ORAL 2 TIMES DAILY
Qty: 60 CAPSULE | Refills: 0 | Status: SHIPPED | OUTPATIENT
Start: 2023-06-19

## 2023-06-19 RX ORDER — LEVOTHYROXINE SODIUM 137 MCG
137 TABLET ORAL DAILY
Qty: 90 TABLET | Refills: 3 | Status: SHIPPED | OUTPATIENT
Start: 2023-06-19

## 2023-06-19 RX ORDER — DILTIAZEM HYDROCHLORIDE 120 MG/1
120 CAPSULE, COATED, EXTENDED RELEASE ORAL DAILY
Qty: 90 CAPSULE | Refills: 3 | Status: SHIPPED | OUTPATIENT
Start: 2023-06-19

## 2023-06-19 RX ORDER — POTASSIUM CHLORIDE 20 MEQ/1
20 TABLET, EXTENDED RELEASE ORAL DAILY
Qty: 180 TABLET | Refills: 3 | Status: SHIPPED | OUTPATIENT
Start: 2023-06-19

## 2023-06-19 RX ORDER — DABIGATRAN ETEXILATE 150 MG/1
150 CAPSULE ORAL 2 TIMES DAILY
Qty: 180 CAPSULE | Refills: 3 | Status: SHIPPED | OUTPATIENT
Start: 2023-06-19

## 2023-06-19 NOTE — Clinical Note
Please TICKLE to call Aimee in 3 weeks.  Has the addition of gabapentin been helpful?  Should we continue it?  Should we consider increasing the dose?  Thanks.

## 2023-06-19 NOTE — PROGRESS NOTES
The ABCs of the Annual Wellness Visit  Subsequent Medicare Wellness Visit    Subjective    Becky Zavala is a 89 y.o. female who presents for a Subsequent Medicare Wellness Visit.    The following portions of the patient's history were reviewed and updated as appropriate: allergies, current medications, past family history, past medical history, past social history, past surgical history, and problem list.    Compared to one year ago, the patient feels her physical health is the same.    Compared to one year ago, the patient feels her mental health is the same.    Recent Hospitalizations:  This patient has had a [facility] admission record on file within the last 365 days.    Current Medical Providers:  Patient Care Team:  Surjit Gutierrez MD as PCP - General (Family Medicine)  Roque Bangura MD as Consulting Physician (Infectious Diseases)    Outpatient Medications Prior to Visit   Medication Sig Dispense Refill    cyanocobalamin 1000 MCG/ML injection Inject 1 mL into the appropriate muscle as directed by prescriber Every 30 (Thirty) Days. 3 mL 3    furosemide (LASIX) 20 MG tablet Take 1 tablet by mouth Daily. (Patient taking differently: Take 1 tablet by mouth Daily As Needed.) 30 tablet 1    Melatonin 10 MG tablet Take 1 tablet by mouth. PATIENT TAKES ONE EVERY EVENING/UNSURE IF TABLET OR CAPSULE      Multiple Vitamins-Minerals (ICAPS AREDS 2 PO) Take 1 capsule by mouth 2 (Two) Times a Day.      timolol (TIMOPTIC) 0.5 % ophthalmic solution Administer 1 drop to both eyes 2 (Two) Times a Day.      triamcinolone (KENALOG) 0.1 % cream Apply 1 application topically to the appropriate area as directed Daily As Needed.      vitamin D (ERGOCALCIFEROL) 1.25 MG (79742 UT) capsule capsule Take 1 capsule by mouth 1 (One) Time Per Week.      busPIRone (BUSPAR) 10 MG tablet Take 1 tablet by mouth 2 (Two) Times a Day As Needed (anxiety). 40 tablet 1    calcitriol (ROCALTROL) 0.5 MCG capsule Take 1  capsule by mouth 2 (Two) Times a Day. 180 capsule 3    cholecalciferol (VITAMIN D3) 1000 UNITS tablet Take 2 tablets by mouth Daily. HOLD PRIOR TO SURG      dilTIAZem CD (CARDIZEM CD) 120 MG 24 hr capsule Take 1 capsule by mouth Daily. 90 capsule 3    doxycycline (VIBRAMYCIN) 100 MG capsule Take 1 capsule by mouth 2 (Two) Times a Day. 180 capsule 3    potassium chloride (KLOR-CON) 20 MEQ CR tablet Take 1 tablet by mouth Daily. 180 tablet 3    Pradaxa 150 MG capsu TAKE 1 CAPSULE TWICE DAILY 180 capsule 1    pravastatin (PRAVACHOL) 80 MG tablet Take 1 tablet by mouth Every Night. 90 tablet 3    raloxifene (EVISTA) 60 MG tablet Take 1 tablet by mouth Daily. 90 tablet 3    sertraline (ZOLOFT) 100 MG tablet Take 1 tablet by mouth Daily. 90 tablet 3    Synthroid 137 MCG tablet Take 1 tablet by mouth Daily. Brand-name only. 90 tablet 3     No facility-administered medications prior to visit.       No opioid medication identified on active medication list. I have reviewed chart for other potential  high risk medication/s and harmful drug interactions in the elderly.      Aspirin is not on active medication list.  Aspirin use is not indicated based on review of current medical condition/s. Risk of harm outweighs potential benefits.  .    Patient Active Problem List   Diagnosis    Pseudoaneurysm of left femoral artery    Mycotic aneurysm    Fracture of left ankle    CAD (coronary artery disease)    Paroxysmal A-fib    HLD (hyperlipidemia)    Peripheral vascular disease    Ulcer of left heel    Hypothyroidism    Anemia    Postoperative anemia due to acute blood loss    Hypokalemia    Popliteal aneurysm    Acute posthemorrhagic anemia    Hemorrhagic disorder due to extrinsic circulating anticoagulants    GI hemorrhage    Upper GI bleed    Gastrointestinal hemorrhage with melena    Hypoparathyroidism    B12 deficiency    Contusion    Dementia with behavioral disturbance    Essential hypertension    Rectal bleed    Leukocytosis  "   Hematuria    Ileus    Rectal bleeding    AAA (abdominal aortic aneurysm)     Advance Care Planning  Advance Directive is on file.  ACP discussion was held with the patient during this visit. Patient has an advance directive in EMR which is still valid.  Her daughter, Aimee, would make decisions if needed.     Objective    Vitals:    23 1556 23 1623   BP: 158/91 160/82   BP Location: Left arm Left arm   Patient Position: Sitting Sitting   Pulse: 77 70   Temp: 98.3 °F (36.8 °C)    TempSrc: Oral    Weight: 65.8 kg (145 lb)    Height: 170.2 cm (67.01\")      Estimated body mass index is 22.7 kg/m² as calculated from the following:    Height as of this encounter: 170.2 cm (67.01\").    Weight as of this encounter: 65.8 kg (145 lb).    BMI is within normal parameters. No other follow-up for BMI required.    Does the patient have evidence of cognitive impairment? Yes        HEALTH RISK ASSESSMENT    Smoking Status:  Social History     Tobacco Use   Smoking Status Never   Smokeless Tobacco Never   Tobacco Comments    3 cig. A day     Alcohol Consumption:  Social History     Substance and Sexual Activity   Alcohol Use Not Currently     Fall Risk Screen:    STEADI Fall Risk Assessment was completed, and patient is at HIGH risk for falls. Assessment completed on:2023    Depression Screenin/19/2023     3:54 PM   PHQ-2/PHQ-9 Depression Screening   Little Interest or Pleasure in Doing Things 0-->not at all   Feeling Down, Depressed or Hopeless 0-->not at all   PHQ-9: Brief Depression Severity Measure Score 0       Health Habits and Functional and Cognitive Screenin/19/2023     3:54 PM   Functional & Cognitive Status   Do you have difficulty preparing food and eating? Yes   Do you have difficulty bathing yourself, getting dressed or grooming yourself? Yes   Do you have difficulty using the toilet? No   Do you have difficulty moving around from place to place? Yes   Do you have trouble with steps " or getting out of a bed or a chair? No   Current Diet Well Balanced Diet   Dental Exam Other        Dental Exam Comment dentures   Eye Exam Up to date   Exercise (times per week) 0 times per week   Current Exercises Include No Regular Exercise   Do you need help using the phone?  Yes   Are you deaf or do you have serious difficulty hearing?  Yes   Do you need help with transportation? Yes   Do you need help shopping? Yes   Do you need help preparing meals?  No   Do you need help with housework?  Yes   Do you need help with laundry? Yes   Do you need help taking your medications? Yes   Do you need help managing money? Yes   Do you ever drive or ride in a car without wearing a seat belt? No   Have you felt unusual stress, anger or loneliness in the last month? No   Who do you live with? Alone   If you need help, do you have trouble finding someone available to you? No   Have you been bothered in the last four weeks by sexual problems? No   Do you have difficulty concentrating, remembering or making decisions? Yes       Age-appropriate Screening Schedule:  Refer to the list below for future screening recommendations based on patient's age, sex and/or medical conditions. Orders for these recommended tests are listed in the plan section. The patient has been provided with a written plan.    Health Maintenance   Topic Date Due    LIPID PANEL  06/17/2023    INFLUENZA VACCINE  10/01/2023    ANNUAL WELLNESS VISIT  06/19/2024    TDAP/TD VACCINES (4 - Td or Tdap) 07/08/2028    COVID-19 Vaccine  Completed    Pneumococcal Vaccine 65+  Completed    ZOSTER VACCINE  Completed    DXA SCAN  Discontinued                CMS Preventative Services Quick Reference  Risk Factors Identified During Encounter  Depression/Dysphoria: Current medication is effective, no change recommended  Fall Risk-High or Moderate: Information on Fall Prevention Shared in After Visit Summary  Hearing Problem:  They are aware of these issues.  Immunizations  Discussed/Encouraged: COVID19  The above risks/problems have been discussed with the patient.  Pertinent information has been shared with the patient in the After Visit Summary.  An After Visit Summary and PPPS were made available to the patient.    Follow Up:   Next Medicare Wellness visit to be scheduled in 1 year.       Additional E&M Note during same encounter follows:  Patient has multiple medical problems which are significant and separately identifiable that require additional work above and beyond the Medicare Wellness Visit.      Chief Complaint:  Follow up hypertension, lipids, thyroid, dementia    Subjective        In addition to the Medicare wellness exam, Trinity is in today for follow-up on her care.  She has several health problems that we are following.  She has hypertension for which she remains on treatment as noted below.  Her blood pressure tends to run high here.  However, it is usually in a good range at home.  They have not had significant difficulty with the medications up to this time.     In addition, Trinity has atrial fibrillation and remains on treatment for this.  Part of her treatment includes Pradaxa.  She has not had any significant bleeding issues.     She also has hypothyroidism and elevated cholesterol and remains on treatment for these issues.  She did have laboratory evaluation fairly recently that looked good across-the-board.    Trinity does have dementia for which she has had some behavioral issues.  She is doing some more picking of her skin in part because of this.  Her daughter is asking whether clomipramine might be an option to help treat this.    Review of Systems:  Review of Systems   Constitutional:  Negative for chills and fever.   Respiratory:  Negative for cough and shortness of breath.    Cardiovascular:  Negative for chest pain and palpitations.   Gastrointestinal:  Negative for abdominal pain, nausea and vomiting.      Objective   Vital Signs:  /82 (BP Location:  "Left arm, Patient Position: Sitting)   Pulse 70   Temp 98.3 °F (36.8 °C) (Oral)   Ht 170.2 cm (67.01\")   Wt 65.8 kg (145 lb)   BMI 22.70 kg/m²     Physical Exam  Vitals and nursing note reviewed.   Constitutional:       General: She is not in acute distress.     Appearance: She is not ill-appearing.   HENT:      Right Ear: Tympanic membrane and ear canal normal.      Left Ear: Tympanic membrane and ear canal normal.      Ears:      Comments: Hearing is diminished in both ears with forced whisper.     Mouth/Throat:      Mouth: Mucous membranes are moist.      Comments: Pharynx appears normal  Eyes:      Extraocular Movements: Extraocular movements intact.      Pupils: Pupils are equal, round, and reactive to light.      Comments: Limited visual acuity is noted.   Neck:      Thyroid: No thyromegaly.   Cardiovascular:      Rate and Rhythm: Normal rate and regular rhythm.      Heart sounds: No murmur heard.  Pulmonary:      Effort: Pulmonary effort is normal.      Breath sounds: Normal breath sounds.   Abdominal:      General: There is no distension.      Palpations: Abdomen is soft. There is no mass.      Tenderness: There is no abdominal tenderness.   Musculoskeletal:      Cervical back: Normal range of motion.   Skin:     Findings: No lesion or rash.   Neurological:      General: No focal deficit present.      Mental Status: Mental status is at baseline. She is disoriented.      Cranial Nerves: No cranial nerve deficit.   Psychiatric:         Mood and Affect: Mood normal.         Behavior: Behavior normal.     The following data was reviewed by Surjit Gutierrez MD on 06/19/2023.  Lab Results   Component Value Date    WBC 7.24 04/21/2023    HGB 12.4 04/21/2023    HCT 39.9 04/21/2023    MCV 89.7 04/21/2023     04/21/2023     Lab Results   Component Value Date    GLUCOSE 94 04/21/2023    BUN 22 04/21/2023    CREATININE 0.81 04/21/2023     04/21/2023    K 4.5 04/21/2023     04/21/2023    CO2 " 27.6 04/21/2023    CALCIUM 9.8 04/21/2023    PROTEINTOT 5.4 (L) 04/21/2023    ALBUMIN 3.1 (L) 04/21/2023    ALT 19 04/21/2023    AST 23 04/21/2023    ALKPHOS 68 04/21/2023    BILITOT 0.5 04/21/2023    EGFR 69.9 04/21/2023    GLOB 2.3 04/21/2023    AGRATIO 1.3 04/21/2023    BCR 27.2 (H) 04/21/2023    ANIONGAP 7.4 04/21/2023      Lab Results   Component Value Date    CHOL 142 06/17/2022    CHLPL 159 05/24/2021    TRIG 138 06/17/2022    HDL 34 (L) 06/17/2022    LDL 83 06/17/2022     Lab Results   Component Value Date    TSH 2.810 04/21/2023     Lab Results   Component Value Date    HGBA1C 5.70 (H) 12/16/2022               Assessment and Plan:       Today, we have reviewed her care.  Regarding the wellness exam, please see the above recommendations.  COVID-19 booster will be given today.  There are no specific cancer screenings I would recommend based on her advanced age.  The family is in agreement with this.    Regarding her usual care, her blood pressure remains mildly elevated.  It tends to do okay at home though, and I am not recommending any specific change in her medication at this time.  With regard to the picking that Aimee described, we will give her a low-dose of gabapentin at this time.  Risks of dizziness and sleepiness are discussed.  We will obtain her verbal consent for this medication.  We will also reach out in a few weeks to see if the medication is helpful.  Otherwise, I will review her chart again in about 90 days for possible labs.  We will move forward from there.    Diagnoses and all orders for this visit:    1. Physical exam (Primary)    2. Essential hypertension  Comments:  As above.    3. Mixed hyperlipidemia  -     pravastatin (PRAVACHOL) 80 MG tablet; Take 1 tablet by mouth Every Night.  Dispense: 90 tablet; Refill: 3    4. Dementia with behavioral disturbance  -     sertraline (ZOLOFT) 100 MG tablet; Take 1 tablet by mouth Daily.  Dispense: 90 tablet; Refill: 3  -     gabapentin (NEURONTIN)  100 MG capsule; Take 1 capsule by mouth 2 (Two) Times a Day.  Dispense: 60 capsule; Refill: 0    5. Hypoparathyroidism, unspecified hypoparathyroidism type  -     calcitriol (ROCALTROL) 0.5 MCG capsule; Take 1 capsule by mouth 2 (Two) Times a Day.  Dispense: 180 capsule; Refill: 3    6. Paroxysmal A-fib  -     dilTIAZem CD (CARDIZEM CD) 120 MG 24 hr capsule; Take 1 capsule by mouth Daily.  Dispense: 90 capsule; Refill: 3  -     potassium chloride (KLOR-CON) 20 MEQ CR tablet; Take 1 tablet by mouth Daily.  Dispense: 180 tablet; Refill: 3  -     dabigatran etexilate (Pradaxa) 150 MG capsu; Take 1 capsule by mouth 2 (Two) Times a Day.  Dispense: 180 capsule; Refill: 3    7. Acquired hypothyroidism  -     Synthroid 137 MCG tablet; Take 1 tablet by mouth Daily. Brand-name only.  Dispense: 90 tablet; Refill: 3    8. Encounter for immunization  -     COVID-19 (Pfizer) Bivalent 12+yrs    Other orders  -     doxycycline (VIBRAMYCIN) 100 MG capsule; Take 1 capsule by mouth 2 (Two) Times a Day.  Dispense: 180 capsule; Refill: 3  -     raloxifene (EVISTA) 60 MG tablet; Take 1 tablet by mouth Daily.  Dispense: 90 tablet; Refill: 3       Follow Up   Return in about 6 months (around 12/19/2023) for Recheck.  Patient was given instructions and counseling regarding her condition or for health maintenance advice. Please see specific information pulled into the AVS if appropriate.

## 2023-07-31 ENCOUNTER — TELEPHONE (OUTPATIENT)
Dept: FAMILY MEDICINE CLINIC | Age: 88
End: 2023-07-31
Payer: MEDICARE

## 2023-07-31 DIAGNOSIS — R82.90 MALODOROUS URINE: Primary | ICD-10-CM

## 2023-08-02 ENCOUNTER — LAB (OUTPATIENT)
Dept: LAB | Facility: HOSPITAL | Age: 88
End: 2023-08-02
Payer: MEDICARE

## 2023-08-02 DIAGNOSIS — R82.90 MALODOROUS URINE: ICD-10-CM

## 2023-08-02 LAB
BACTERIA UR QL AUTO: ABNORMAL /HPF
BILIRUB UR QL STRIP: NEGATIVE
CLARITY UR: CLEAR
COD CRY URNS QL: ABNORMAL /HPF
COLOR UR: YELLOW
GLUCOSE UR STRIP-MCNC: NEGATIVE MG/DL
HGB UR QL STRIP.AUTO: NEGATIVE
KETONES UR QL STRIP: NEGATIVE
LEUKOCYTE ESTERASE UR QL STRIP.AUTO: NEGATIVE
NITRITE UR QL STRIP: NEGATIVE
PH UR STRIP.AUTO: 6.5 [PH] (ref 5–8)
PROT UR QL STRIP: NEGATIVE
RBC # UR STRIP: ABNORMAL /HPF
REF LAB TEST METHOD: ABNORMAL
SP GR UR STRIP: 1.02 (ref 1–1.03)
SQUAMOUS #/AREA URNS HPF: ABNORMAL /HPF
UROBILINOGEN UR QL STRIP: NORMAL
WBC # UR STRIP: ABNORMAL /HPF

## 2023-08-02 PROCEDURE — 87077 CULTURE AEROBIC IDENTIFY: CPT

## 2023-08-02 PROCEDURE — 87086 URINE CULTURE/COLONY COUNT: CPT

## 2023-08-02 PROCEDURE — 87186 SC STD MICRODIL/AGAR DIL: CPT

## 2023-08-02 PROCEDURE — 81001 URINALYSIS AUTO W/SCOPE: CPT

## 2023-08-04 LAB — BACTERIA SPEC AEROBE CULT: ABNORMAL

## 2023-08-07 ENCOUNTER — OFFICE VISIT (OUTPATIENT)
Dept: FAMILY MEDICINE CLINIC | Age: 88
End: 2023-08-07
Payer: MEDICARE

## 2023-08-07 ENCOUNTER — HOSPITAL ENCOUNTER (OUTPATIENT)
Dept: GENERAL RADIOLOGY | Facility: HOSPITAL | Age: 88
Discharge: HOME | End: 2023-08-07
Payer: MEDICARE

## 2023-08-07 VITALS
HEIGHT: 67 IN | WEIGHT: 147 LBS | TEMPERATURE: 97.8 F | OXYGEN SATURATION: 100 % | BODY MASS INDEX: 23.07 KG/M2 | DIASTOLIC BLOOD PRESSURE: 60 MMHG | HEART RATE: 61 BPM | SYSTOLIC BLOOD PRESSURE: 145 MMHG

## 2023-08-07 DIAGNOSIS — M79.642 LEFT HAND PAIN: ICD-10-CM
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
## 2023-08-07 DIAGNOSIS — S62.649A: Primary | ICD-10-CM

## 2023-08-07 DIAGNOSIS — F03.918 DEMENTIA WITH BEHAVIORAL DISTURBANCE: ICD-10-CM

## 2023-08-07 DIAGNOSIS — S41.112A SKIN TEAR OF LEFT UPPER ARM WITHOUT COMPLICATION, INITIAL ENCOUNTER: ICD-10-CM

## 2023-08-07 PROCEDURE — 73130 X-RAY EXAM OF HAND: CPT

## 2023-08-07 PROCEDURE — 1160F RVW MEDS BY RX/DR IN RCRD: CPT

## 2023-08-07 PROCEDURE — 99214 OFFICE O/P EST MOD 30 MIN: CPT

## 2023-08-07 PROCEDURE — 1159F MED LIST DOCD IN RCRD: CPT

## 2023-08-07 NOTE — PROGRESS NOTES
Subjective     CHIEF COMPLAINT    Chief Complaint   Patient presents with    Hand Pain     Fell out of bed, injured her left index finger and left elbow, happened Sunday morning.      History of Present Illness  Patient is an 89-year-old female who presents to the clinic today with her daughter Aimee.  Patient has history of dementia so majority of the history has been obtained from her daughter Aimee.  Patient's daughter reports that patient fell out of the bed yesterday morning and injured her left hand.  Daughter states that patient did not hit her head.  Patient is on Pradaxa.  They have been giving her Tylenol for the pain as well as using ice.  She reports that it appears the area has become more purple and swollen since yesterday.  Her daughter also reports a skin tear to the left upper arm, which her caregivers had applied steri stripes to.  She has not had any chills or fever.       Review of Systems   Constitutional:  Negative for chills and fever.   Musculoskeletal:  Positive for arthralgias (Left hand), joint swelling (Left hand) and myalgias (Left hand).   Skin:  Positive for wound (skin tear left arm).       Allergies   Allergen Reactions    Ciprofloxacin Other (See Comments)     neck pain; lips and eyes swelled    Other      ALL DRUGS IN THE FLUOROQUINOLONES       Current Outpatient Medications on File Prior to Visit   Medication Sig Dispense Refill    calcitriol (ROCALTROL) 0.5 MCG capsule Take 1 capsule by mouth 2 (Two) Times a Day. 180 capsule 3    cyanocobalamin 1000 MCG/ML injection Inject 1 mL into the appropriate muscle as directed by prescriber Every 30 (Thirty) Days. 3 mL 3    dabigatran etexilate (Pradaxa) 150 MG capsu Take 1 capsule by mouth 2 (Two) Times a Day. 180 capsule 3    dilTIAZem CD (CARDIZEM CD) 120 MG 24 hr capsule Take 1 capsule by mouth Daily. 90 capsule 3    doxycycline (VIBRAMYCIN) 100 MG capsule Take 1 capsule by mouth 2 (Two) Times a Day. 180 capsule 3    gabapentin  "(NEURONTIN) 300 MG capsule Take 1 capsule by mouth 2 (Two) Times a Day. 60 capsule 1    Melatonin 10 MG tablet Take 1 tablet by mouth. PATIENT TAKES ONE EVERY EVENING/UNSURE IF TABLET OR CAPSULE      Multiple Vitamins-Minerals (ICAPS AREDS 2 PO) Take 1 capsule by mouth 2 (Two) Times a Day.      potassium chloride (KLOR-CON) 20 MEQ CR tablet Take 1 tablet by mouth Daily. 180 tablet 3    pravastatin (PRAVACHOL) 80 MG tablet Take 1 tablet by mouth Every Night. 90 tablet 3    raloxifene (EVISTA) 60 MG tablet Take 1 tablet by mouth Daily. 90 tablet 3    sertraline (ZOLOFT) 100 MG tablet Take 1 tablet by mouth Daily. 90 tablet 3    Synthroid 137 MCG tablet Take 1 tablet by mouth Daily. Brand-name only. 90 tablet 3    timolol (TIMOPTIC) 0.5 % ophthalmic solution Administer 1 drop to both eyes 2 (Two) Times a Day.      triamcinolone (KENALOG) 0.1 % cream Apply 1 application  topically to the appropriate area as directed Daily As Needed.      vitamin D (ERGOCALCIFEROL) 1.25 MG (86136 UT) capsule capsule Take 1 capsule by mouth 1 (One) Time Per Week.      furosemide (LASIX) 20 MG tablet Take 1 tablet by mouth Daily. (Patient not taking: Reported on 8/7/2023) 30 tablet 1     No current facility-administered medications on file prior to visit.     /60 (BP Location: Left arm, Patient Position: Sitting, Cuff Size: Adult)   Pulse 61   Temp 97.8 øF (36.6 øC) (Oral)   Ht 170.2 cm (67.01\")   Wt 66.7 kg (147 lb)   SpO2 100%   BMI 23.02 kg/mý     Objective     Physical Exam  Vitals and nursing note reviewed.   Constitutional:       General: She is not in acute distress.     Appearance: Normal appearance. She is not ill-appearing.   HENT:      Head: Normocephalic.   Eyes:      Extraocular Movements: Extraocular movements intact.   Cardiovascular:      Pulses:           Radial pulses are 2+ on the left side.   Pulmonary:      Effort: Pulmonary effort is normal. No accessory muscle usage or respiratory distress. "   Musculoskeletal:      Left wrist: Normal. No swelling, deformity, tenderness, bony tenderness or snuff box tenderness. Normal range of motion.      Left hand: Swelling, tenderness and bony tenderness present. Decreased range of motion (due to pain). Normal capillary refill. Normal pulse.      Comments: There is significant swelling and bruising of the left hand. Patient has tenderness to the ventral aspect of the left hand. She has difficulty making a fist due to pain and swelling.     Small skin tear noted to outer aspect of left upper arm, superior to elbow. No evidence of infection.    Skin:     General: Skin is warm and dry.      Capillary Refill: Capillary refill takes less than 2 seconds.      Findings: Bruising (left hand) present.   Neurological:      Mental Status: She is alert. Mental status is at baseline.        XR Hand 3+ View Left    Result Date: 8/7/2023  PROCEDURE: XR HAND 3+ VW LEFT  COMPARISON: None  INDICATIONS: GENERAL LEFT HAND SWELLING AFTER FALL X 1 DAY  FINDINGS:   The bones are osteopenic.  There is an extra-articular fracture through the proximal shaft of the proximal phalanx of the left 2nd finger.  Soft tissue swelling is present.  There are advanced degenerative changes in the 1st carpometacarpal joint.  Atherosclerotic vascular calcification is present.  IMPRESSION: Extra-articular fracture through the proximal shaft of the proximal phalanx of the left 2nd finger.  Soft tissue swelling.   JOHNIE WOMACK MD       Electronically Signed and Approved By: JOHNIE WOMACK MD on 8/07/2023 at 15:56                    Diagnoses and all orders for this visit:    1. Nondisplaced fracture of proximal phalanx of finger of left hand (Primary)    2. Left hand pain  -     XR Hand 3+ View Left; Future    3. Fall, initial encounter    4. Skin tear of left upper arm without complication, initial encounter    5. Dementia with behavioral disturbance      Will obtain an x-ray of the left hand today to  rule out any acute findings.  No other concerning findings on exam today.  Skin tear was assessed and evaluated, no evidence of infection.      Addendum: X-ray completed in office and shows extra-articular fracture of the left second finger and soft tissue swelling.  Reviewed and discussed patient case with Dr. Fulton, on-call provider.  Would recommend patient proceed to ER for further evaluation.  She will likely need a cast and further work-up of the left hand.  Discussed this with patient and her daughter in office.  We would recommend she proceed to [facility] as they have hand ER.  Daughter is agreeable to plan, will take patient to ER today.  Skin tear was dressed with Vaseline gauze and wrapped with Kerlix.  Daughter declines scheduling a follow-up with PCP.       Follow-up:  No follow-ups on file.  Patient was given instructions and counseling regarding her condition or for health maintenance advice. Please see specific information pulled into the AVS if appropriate.

## 2023-08-08 ENCOUNTER — TELEPHONE (OUTPATIENT)
Dept: ORTHOPEDIC SURGERY | Facility: CLINIC | Age: 88
End: 2023-08-08
Payer: MEDICARE

## 2023-08-08 ENCOUNTER — OFFICE VISIT (OUTPATIENT)
Dept: ORTHOPEDIC SURGERY | Facility: CLINIC | Age: 88
End: 2023-08-08
Payer: MEDICARE

## 2023-08-08 VITALS — TEMPERATURE: 97.8 F | WEIGHT: 147 LBS | BODY MASS INDEX: 23.07 KG/M2 | HEIGHT: 67 IN

## 2023-08-08 DIAGNOSIS — S62.611A DISPLACED FRACTURE OF PROXIMAL PHALANX OF LEFT INDEX FINGER, INITIAL ENCOUNTER FOR CLOSED FRACTURE: Primary | ICD-10-CM

## 2023-08-08 NOTE — PROGRESS NOTES
"Chief Complaint  Establish Care, Pain, and Injury of the Left Hand    Subjective    History of Present Illness      Becky Zavala is a 89 y.o. female who presents to [facility] for finger pain secondary to injury. She is accompanied by an adult female.  The adult female states that she fell forward out of her bed and landed on her hand Sunday morning. DOI was August 6, 2023. She reports the pain as a moderate pain, rated at 7/10 on pain scale. She describes the pain as an aching pain. She also reports associated stiffness, swelling, and bruising.   The patient has caretakers that come to her house.   The patient is utilizing Tylenol which has been effective.      Objective   Vital Signs:   Temp 97.8 øF (36.6 øC)   Ht 170.2 cm (67.01\")   Wt 66.7 kg (147 lb)   BMI 23.02 kg/mý       Physical Exam  Vitals and nursing note reviewed.   Constitutional:       Appearance: Normal appearance.   Pulmonary:      Effort: Pulmonary effort is normal.   Skin:     General: Skin is warm and dry.      Capillary Refill: Capillary refill takes less than 2 seconds.   Neurological:      General: No focal deficit present.      Mental Status: She is alert and oriented to person, place, and time. Mental status is at baseline.   Psychiatric:         Mood and Affect: Mood normal.         Behavior: Behavior normal.         Thought Content: Thought content normal.         Judgment: Judgment normal.       Ortho Exam   LEFT hand  She is right dominant.   There is a significant amount of soft tissue swelling both on the dorsal and volar aspects of the 2nd finger.  Skin and soft tissue are swollen and bruised throughout the entire hand.   Capillary refill is 2 seconds with a brisk return.   Cascade of the fingers is fairly well preserved.   Patient is unable to make a fist because of pain, swelling and discomfort caused by the fracture.   There is no evidence of a compartmental syndrome.   There is no crossover " deformity of the digit distally.       Result Review :   Radiologic studies - see below for interpretation  LEFT hand xrays   3 views were performed at [facility] on 8/7/23. Images were independently viewed and interpreted by myself, my impression as follows:  Findings: slightly displaced, extra articular fracture of the proximal shaft of the proximal phalanx of the 2nd finger. Moderate degenerative changes throughout the hand, except for the CMC joint of the thumb, which shows severe arthritic changes.          PROCEDURE  Procedures           Assessment   Assessment and Plan    Problem List Items Addressed This Visit          Other    Displaced fracture of proximal phalanx of left index finger, initial encounter for closed fracture - Primary    Relevant Orders    Ambulatory Referral to Occupational Therapy (Completed)           PLAN   Discussion of any imaging in detail. Discussion of orthopaedic goals.  Risk, benefits, and merits of treatment alternatives reviewed with the patient. Treatment alternatives include: bracing and occupational therapy. I will send to OT, Nelia Houston to have a custom splint made. Discussed expectations of outcomes, including some loss of ROM of the affected finger.   Ice, heat, and/or modalities as beneficial  Patient is encouraged to call or return for any issues or concerns.  Follow up in 4 weeks  Patient was given instructions and counseling regarding her condition or for health maintenance advice. Please see specific information pulled into the AVS if appropriate.     Josh Mckeon PA-C   Date of Encounter: 8/8/2023       Transcribed from ambient dictation for Josh Mckeon PA-C by Rosa Isela Fairchild.  08/08/23   17:43 EDT    Patient or patient representative verbalized consent to the visit recording.  I have personally performed the services described in this document as transcribed by the above individual, and it is both accurate and complete.  Josh  Angelica Mckeon PA-C  8/13/2023  23:17 EDT

## 2023-08-08 NOTE — TELEPHONE ENCOUNTER
Caller: SRINIVAS     Relationship to patient: DAUGHTER    Best call back number: [phone]    Patient is needing: CALLING TO SEE IF XRAYS HAD BEEN LOOKED AT TO SEE IF SHE CAN BE SEEN HERE - SPOKE WITH OCTAVIO THIS MORNING - PLEASE REACH OUT AND ADVISE       
CALLED PATIENT'S DAUGHTER AND SCHEDULED APPOINTMENT  
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

## 2023-08-13 PROBLEM — S62.611A: Status: ACTIVE | Noted: 2023-08-13

## 2023-09-07 DIAGNOSIS — S62.611A DISPLACED FRACTURE OF PROXIMAL PHALANX OF LEFT INDEX FINGER, INITIAL ENCOUNTER FOR CLOSED FRACTURE: Primary | ICD-10-CM

## 2023-09-13 ENCOUNTER — OFFICE VISIT (OUTPATIENT)
Dept: ORTHOPEDIC SURGERY | Facility: CLINIC | Age: 88
End: 2023-09-13
Payer: MEDICARE

## 2023-09-13 ENCOUNTER — HOSPITAL ENCOUNTER (OUTPATIENT)
Dept: GENERAL RADIOLOGY | Facility: HOSPITAL | Age: 88
Discharge: HOME | End: 2023-09-13
Admitting: PHYSICIAN ASSISTANT
Payer: MEDICARE

## 2023-09-13 VITALS — HEIGHT: 67 IN | BODY MASS INDEX: 23.07 KG/M2 | TEMPERATURE: 98.6 F | WEIGHT: 147 LBS

## 2023-09-13 DIAGNOSIS — S62.631D CLOSED DISPLACED FRACTURE OF DISTAL PHALANX OF LEFT INDEX FINGER WITH ROUTINE HEALING, SUBSEQUENT ENCOUNTER: Primary | ICD-10-CM

## 2023-09-13 DIAGNOSIS — F03.918 DEMENTIA WITH BEHAVIORAL DISTURBANCE: ICD-10-CM

## 2023-09-13 DIAGNOSIS — S62.611A DISPLACED FRACTURE OF PROXIMAL PHALANX OF LEFT INDEX FINGER, INITIAL ENCOUNTER FOR CLOSED FRACTURE: ICD-10-CM

## 2023-09-13 PROCEDURE — 1160F RVW MEDS BY RX/DR IN RCRD: CPT | Performed by: PHYSICIAN ASSISTANT

## 2023-09-13 PROCEDURE — 73140 X-RAY EXAM OF FINGER(S): CPT

## 2023-09-13 PROCEDURE — 99024 POSTOP FOLLOW-UP VISIT: CPT | Performed by: PHYSICIAN ASSISTANT

## 2023-09-13 PROCEDURE — 1159F MED LIST DOCD IN RCRD: CPT | Performed by: PHYSICIAN ASSISTANT

## 2023-09-13 RX ORDER — GABAPENTIN 300 MG/1
300 CAPSULE ORAL 2 TIMES DAILY
Qty: 60 CAPSULE | Refills: 2 | Status: SHIPPED | OUTPATIENT
Start: 2023-09-13

## 2023-09-13 NOTE — PROGRESS NOTES
"Chief Complaint  Fracture of the Left Hand    Subjective    History of Present Illness      Becky Zavala is a 89 y.o. female who presents to [facility] for 4-week follow-up of displaced fracture of proximal phalanx of left index finger.  At last visit I referred her to occupational therapy at [facility], and a custom splint was fabricated by Nelia Houston OT. She reports she is still experiencing some pain, but overall it has improved.      HPI 8/8/23  Finger pain secondary to injury. She is accompanied by an adult female.  The adult female states that she fell forward out of her bed and landed on her hand Sunday morning. DOI was August 6, 2023. She reports the pain as a moderate pain, rated at 7/10 on pain scale. She describes the pain as an aching pain. She also reports associated stiffness, swelling, and bruising.   The patient has caretakers that come to her house.   The patient is utilizing Tylenol which has been effective.      Objective   Vital Signs:   Temp 98.6 °F (37 °C)   Ht 170.2 cm (67.01\")   Wt 66.7 kg (147 lb)   BMI 23.02 kg/m²       Physical Exam  Vitals and nursing note reviewed.   Constitutional:       Appearance: Normal appearance.   Pulmonary:      Effort: Pulmonary effort is normal.   Skin:     General: Skin is warm and dry.      Capillary Refill: Capillary refill takes less than 2 seconds.   Neurological:      General: No focal deficit present.      Mental Status: She is alert and oriented to person, place, and time. Mental status is at baseline.   Psychiatric:         Mood and Affect: Mood normal.         Behavior: Behavior normal.         Thought Content: Thought content normal.         Judgment: Judgment normal.       Ortho Exam   LEFT hand  She is right dominant.   There is a significant amount of soft tissue swelling both on the dorsal and volar aspects of the 2nd finger.  Skin and soft tissue are swollen and bruised throughout the entire hand.   Capillary " refill is 2 seconds with a brisk return.   Cascade of the fingers is fairly well preserved.   Patient is unable to make a fist because of pain, swelling and discomfort caused by the fracture.   There is no evidence of a compartmental syndrome.   There is no crossover deformity of the digit distally.       Result Review :   Radiologic studies - see below for interpretation  LEFT hand xrays   3 views were ordered by Josh Mckeon PA-C. Performed at [facility] on 9/13/2023. Images were independently viewed and interpreted by myself, my impression as follows:   Findings: slight healing/callus formation is noted at the displaced, extra-articular fracture of the proximal shaft of the 2nd phalanx  Bony lesion: no  Soft tissues: within normal limits  Joint spaces: decreased  Hardware appropriately positioned: not applicable  Prior studies available for comparison: yes       LEFT hand xrays   3 views were performed at [facility] on 8/7/23. Images were independently viewed and interpreted by myself, my impression as follows:  Findings: slightly displaced, extra articular fracture of the proximal shaft of the proximal phalanx of the 2nd finger. Moderate degenerative changes throughout the hand, except for the CMC joint of the thumb, which shows severe arthritic changes.          PROCEDURE  Procedures           Assessment   Assessment and Plan    Diagnoses and all orders for this visit:    1. Closed displaced fracture of distal phalanx of left index finger with routine healing, subsequent encounter (Primary)  -     Ambulatory Referral to Physical Therapy Evaluate and treat             PLAN   Discussion of any imaging in detail. Discussion of orthopaedic goals. She is currently in PT with [facility] and the therapist advises she can work on her hand also.  Continue with use of splint.   Risk, benefits, and merits of treatment alternatives reviewed with the patient.  Treatment alternatives include: bracing and occupational therapy.  Ice, heat, and/or modalities as beneficial  Patient is encouraged to call or return for any issues or concerns.  Follow up in 4 weeks with xrays  Patient was given instructions and counseling regarding her condition or for health maintenance advice. Please see specific information pulled into the AVS if appropriate.     Josh Mckeon PA-C   Date of Encounter: 9/13/2023     Electronically signed by Josh Mckeon PA-C, 09/13/23, 2:34 PM EDT.

## 2023-09-17 ENCOUNTER — PATIENT MESSAGE (OUTPATIENT)
Dept: FAMILY MEDICINE CLINIC | Age: 88
End: 2023-09-17
Payer: MEDICARE

## 2023-09-17 DIAGNOSIS — I48.0 PAROXYSMAL A-FIB: ICD-10-CM

## 2023-09-17 DIAGNOSIS — E78.2 MIXED HYPERLIPIDEMIA: ICD-10-CM

## 2023-09-17 DIAGNOSIS — E03.9 ACQUIRED HYPOTHYROIDISM: ICD-10-CM

## 2023-09-17 DIAGNOSIS — E20.9 HYPOPARATHYROIDISM, UNSPECIFIED HYPOPARATHYROIDISM TYPE: ICD-10-CM

## 2023-09-17 DIAGNOSIS — E53.8 B12 DEFICIENCY: ICD-10-CM

## 2023-09-17 DIAGNOSIS — I10 ESSENTIAL HYPERTENSION: Primary | ICD-10-CM

## 2023-09-22 ENCOUNTER — TELEPHONE (OUTPATIENT)
Dept: ORTHOPEDIC SURGERY | Facility: CLINIC | Age: 88
End: 2023-09-22

## 2023-09-22 NOTE — TELEPHONE ENCOUNTER
URGENT REQUEST APPT TODAY 2:45 [facility]    Caller: SRINIVAS    Relationship: DAUGHTER ON [facility]     Best call back number: [phone]    What orders are you requesting (i.e. lab or imaging): SPLINT    In what timeframe would the patient need to come in: TODAY 09/22/23, APPOINTMENT AT [facility] [phone]    Where will you receive your lab/imaging services: [facility]    Additional notes: [facility] ORIGINALLY MADE SPLINT FOR PATIENT'S INDEX FINGER AND IT HAS BEEN LOST.  SRINIVAS WAS TOLD INSURANCE WILL PAY FOR NEW SPLINT AT [facility] WITH ORDER       SRINIVAS REQUESTING CALL BACK [phone] ONCE ORDER FAXED, THANK YOU

## 2023-10-02 DIAGNOSIS — T14.8XXA ABRASION: ICD-10-CM

## 2023-10-02 NOTE — TELEPHONE ENCOUNTER
Rx Refill Note  Requested Prescriptions     Pending Prescriptions Disp Refills    mupirocin (BACTROBAN) 2 % ointment [Pharmacy Med Name: mupirocin 2 % topical ointment] 22 g 0     Sig: APPLY TO THE AFFECTED AREA(S) topically TWICE DAILY      Last office visit with prescribing clinician: 6/2/2023   Last telemedicine visit with prescribing clinician: Visit date not found   Next office visit with prescribing clinician: Visit date not found   {TIP  Encounters: Progress Notes by Puja Elaine PA-C (04/21/2023 15:15)     Bactroban ointment was prescribed by [facility] for rash/abrasion on bilateral hands and legs on 4/21/23. Pt daughter Aimee states she had also been using on pt face for a basal cell that the dermatologist will not remove due to her age, she called to see if derm would prescribed and was told it needed to come from pcp. Pt daughter states it does help to calm it down some and she is just wanting a refill to use on pt face.         Breanne Reyes  10/02/23, 14:52 EDT

## 2023-10-03 DIAGNOSIS — S62.631D CLOSED DISPLACED FRACTURE OF DISTAL PHALANX OF LEFT INDEX FINGER WITH ROUTINE HEALING, SUBSEQUENT ENCOUNTER: Primary | ICD-10-CM

## 2023-10-18 ENCOUNTER — HOSPITAL ENCOUNTER (OUTPATIENT)
Dept: GENERAL RADIOLOGY | Facility: HOSPITAL | Age: 88
Discharge: HOME | End: 2023-10-18
Admitting: PHYSICIAN ASSISTANT
Payer: MEDICARE

## 2023-10-18 ENCOUNTER — OFFICE VISIT (OUTPATIENT)
Dept: ORTHOPEDIC SURGERY | Facility: CLINIC | Age: 88
End: 2023-10-18
Payer: MEDICARE

## 2023-10-18 VITALS — WEIGHT: 147 LBS | TEMPERATURE: 98.6 F | BODY MASS INDEX: 23.07 KG/M2 | HEIGHT: 67 IN

## 2023-10-18 DIAGNOSIS — S62.631D CLOSED DISPLACED FRACTURE OF DISTAL PHALANX OF LEFT INDEX FINGER WITH ROUTINE HEALING, SUBSEQUENT ENCOUNTER: Primary | ICD-10-CM

## 2023-10-18 DIAGNOSIS — S62.631D CLOSED DISPLACED FRACTURE OF DISTAL PHALANX OF LEFT INDEX FINGER WITH ROUTINE HEALING, SUBSEQUENT ENCOUNTER: ICD-10-CM

## 2023-10-18 PROBLEM — S62.631A CLOSED DISPLACED FRACTURE OF DISTAL PHALANX OF LEFT INDEX FINGER: Status: ACTIVE | Noted: 2023-10-18

## 2023-10-18 PROCEDURE — 73140 X-RAY EXAM OF FINGER(S): CPT

## 2023-10-18 NOTE — PROGRESS NOTES
"Chief Complaint  Fracture of the Left Hand    Subjective    History of Present Illness      Becky Zavala is a 89 y.o. female who presents to [facility] for 8 week follow up on displaced fracture of the proximal phalanx of the left index finger. She has continued to use the brace that was fabricated by СВЕТЛАНА, Nelia Houston. She reports overall improvement in pain, but does report soreness/stiffness. She is doing therapy with PT with her foot and ankle specialist. She is using a bone stimulator on her foot and has also used it some on this finger.       HPI 9/13/23  4-week follow-up of displaced fracture of proximal phalanx of left index finger.  At last visit I referred her to occupational therapy at [facility], and a custom splint was fabricated by Nelia Houston OT. She reports she is still experiencing some pain, but overall it has improved.      HPI 8/8/23  Finger pain secondary to injury. She is accompanied by an adult female.  The adult female states that she fell forward out of her bed and landed on her hand Jose morning. DOI was August 6, 2023. She reports the pain as a moderate pain, rated at 7/10 on pain scale. She describes the pain as an aching pain. She also reports associated stiffness, swelling, and bruising.   The patient has caretakers that come to her house.   The patient is utilizing Tylenol which has been effective.      Objective   Vital Signs:   Temp 98.6 °F (37 °C)   Ht 170.2 cm (67.01\")   Wt 66.7 kg (147 lb)   BMI 23.02 kg/m²       Physical Exam  Vitals and nursing note reviewed.   Constitutional:       Appearance: Normal appearance.   Pulmonary:      Effort: Pulmonary effort is normal.   Skin:     General: Skin is warm and dry.      Capillary Refill: Capillary refill takes less than 2 seconds.   Neurological:      General: No focal deficit present.      Mental Status: She is alert and oriented to person, place, and time. Mental status is at baseline.   Psychiatric: "         Mood and Affect: Mood normal.         Behavior: Behavior normal.         Thought Content: Thought content normal.         Judgment: Judgment normal.       Ortho Exam   LEFT hand  She is right dominant.   There is a mild soft tissue swelling both on the dorsal and volar aspects of the 2nd finger.  Capillary refill is 2 seconds with a brisk return.   Cascade of the fingers is fairly well preserved.   Patient is unable to make a complete fist due to immobilization/stiffness.   There is mild tenderness at the radial aspect of the fracture location.   There is no evidence of a compartmental syndrome.   There is no crossover deformity of the digit distally.       Result Review :   Radiologic studies - see below for interpretation  LEFT hand xrays   3 views were ordered by Josh Mckeon PA-C. Performed at [facility] on 10/18/2023. Images were independently viewed and interpreted by myself, my impression as follows:   Findings: Continued healing noted of the fracture at the proximal shaft of the 2nd phalanx. There is still a visible fracture line on the lateral aspect of the finger  Bony lesion: no  Soft tissues: increased  Joint spaces: decreased  Hardware appropriately positioned: not applicable  Prior studies available for comparison: yes        PRIOR IMAGING  LEFT 2nd finger xrays   3 views were ordered by Josh Mckeon PA-C. Performed at [facility] on 9/13/2023. Images were independently viewed and interpreted by myself, my impression as follows:   Findings: slight healing/callus formation is noted at the displaced, extra-articular fracture of the proximal shaft of the 2nd phalanx  Bony lesion: no  Soft tissues: within normal limits  Joint spaces: decreased  Hardware appropriately positioned: not applicable  Prior studies available for comparison: yes       LEFT hand xrays   3 views were performed at [facility] on 8/7/23. Images were  independently viewed and interpreted by myself, my impression as follows:  Findings: slightly displaced, extra articular fracture of the proximal shaft of the proximal phalanx of the 2nd finger. Moderate degenerative changes throughout the hand, except for the CMC joint of the thumb, which shows severe arthritic changes.          PROCEDURE  Procedures           Assessment   Assessment and Plan    Diagnoses and all orders for this visit:    1. Closed displaced fracture of distal phalanx of left index finger with routine healing, subsequent encounter (Primary)               PLAN   Discussion of any imaging in detail. Discussion of orthopaedic goals.  May discontinue splint and continue with therapy  Risk, benefits, and merits of treatment alternatives reviewed with the patient.   Ice, heat, and/or modalities as beneficial  Patient is encouraged to call or return for any issues or concerns.  Follow up in 4 weeks with xrays  Patient was given instructions and counseling regarding her condition or for health maintenance advice. Please see specific information pulled into the AVS if appropriate.     Josh Mckeon PA-C   Date of Encounter: 10/18/2023     Electronically signed by Josh Mckeon PA-C, 10/18/23, 4:32 PM EDT.

## 2023-10-31 ENCOUNTER — TELEPHONE (OUTPATIENT)
Dept: FAMILY MEDICINE CLINIC | Age: 88
End: 2023-10-31
Payer: MEDICARE

## 2023-11-07 ENCOUNTER — LAB (OUTPATIENT)
Dept: LAB | Facility: HOSPITAL | Age: 88
End: 2023-11-07
Payer: MEDICARE

## 2023-11-07 DIAGNOSIS — I10 ESSENTIAL HYPERTENSION: ICD-10-CM

## 2023-11-07 DIAGNOSIS — E53.8 B12 DEFICIENCY: ICD-10-CM

## 2023-11-07 DIAGNOSIS — I48.0 PAROXYSMAL A-FIB: ICD-10-CM

## 2023-11-07 DIAGNOSIS — E03.9 ACQUIRED HYPOTHYROIDISM: ICD-10-CM

## 2023-11-07 DIAGNOSIS — E20.9 HYPOPARATHYROIDISM, UNSPECIFIED HYPOPARATHYROIDISM TYPE: ICD-10-CM

## 2023-11-07 DIAGNOSIS — E78.2 MIXED HYPERLIPIDEMIA: ICD-10-CM

## 2023-11-07 LAB
ALBUMIN SERPL-MCNC: 3.5 G/DL (ref 3.5–5.2)
ALBUMIN/GLOB SERPL: 1.5 G/DL
ALP SERPL-CCNC: 57 U/L (ref 39–117)
ALT SERPL W P-5'-P-CCNC: 15 U/L (ref 1–33)
ANION GAP SERPL CALCULATED.3IONS-SCNC: 5.3 MMOL/L (ref 5–15)
AST SERPL-CCNC: 21 U/L (ref 1–32)
BILIRUB SERPL-MCNC: 0.5 MG/DL (ref 0–1.2)
BUN SERPL-MCNC: 23 MG/DL (ref 8–23)
BUN/CREAT SERPL: 28.4 (ref 7–25)
CALCIUM SPEC-SCNC: 9.2 MG/DL (ref 8.6–10.5)
CHLORIDE SERPL-SCNC: 106 MMOL/L (ref 98–107)
CHOLEST SERPL-MCNC: 137 MG/DL (ref 0–200)
CK SERPL-CCNC: 61 U/L (ref 20–180)
CO2 SERPL-SCNC: 30.7 MMOL/L (ref 22–29)
CREAT SERPL-MCNC: 0.81 MG/DL (ref 0.57–1)
DEPRECATED RDW RBC AUTO: 46.7 FL (ref 37–54)
EGFRCR SERPLBLD CKD-EPI 2021: 69.5 ML/MIN/1.73
ERYTHROCYTE [DISTWIDTH] IN BLOOD BY AUTOMATED COUNT: 13.6 % (ref 12.3–15.4)
FOLATE SERPL-MCNC: 13.7 NG/ML (ref 4.78–24.2)
GLOBULIN UR ELPH-MCNC: 2.4 GM/DL
GLUCOSE SERPL-MCNC: 106 MG/DL (ref 65–99)
HCT VFR BLD AUTO: 41.2 % (ref 34–46.6)
HDLC SERPL-MCNC: 36 MG/DL (ref 40–60)
HGB BLD-MCNC: 12.6 G/DL (ref 12–15.9)
LDLC SERPL CALC-MCNC: 83 MG/DL (ref 0–100)
LDLC/HDLC SERPL: 2.28 {RATIO}
MCH RBC QN AUTO: 28.1 PG (ref 26.6–33)
MCHC RBC AUTO-ENTMCNC: 30.6 G/DL (ref 31.5–35.7)
MCV RBC AUTO: 92 FL (ref 79–97)
PLATELET # BLD AUTO: 116 10*3/MM3 (ref 140–450)
PMV BLD AUTO: 10.3 FL (ref 6–12)
POTASSIUM SERPL-SCNC: 4.3 MMOL/L (ref 3.5–5.2)
PROT SERPL-MCNC: 5.9 G/DL (ref 6–8.5)
RBC # BLD AUTO: 4.48 10*6/MM3 (ref 3.77–5.28)
SODIUM SERPL-SCNC: 142 MMOL/L (ref 136–145)
TRIGL SERPL-MCNC: 95 MG/DL (ref 0–150)
TSH SERPL DL<=0.05 MIU/L-ACNC: 8.87 UIU/ML (ref 0.27–4.2)
VIT B12 BLD-MCNC: >2000 PG/ML (ref 211–946)
VLDLC SERPL-MCNC: 18 MG/DL (ref 5–40)
WBC NRBC COR # BLD: 5.64 10*3/MM3 (ref 3.4–10.8)

## 2023-11-07 PROCEDURE — 82746 ASSAY OF FOLIC ACID SERUM: CPT

## 2023-11-07 PROCEDURE — 80061 LIPID PANEL: CPT

## 2023-11-07 PROCEDURE — 82550 ASSAY OF CK (CPK): CPT

## 2023-11-07 PROCEDURE — 85027 COMPLETE CBC AUTOMATED: CPT

## 2023-11-07 PROCEDURE — 36415 COLL VENOUS BLD VENIPUNCTURE: CPT

## 2023-11-07 PROCEDURE — 84443 ASSAY THYROID STIM HORMONE: CPT

## 2023-11-07 PROCEDURE — 80053 COMPREHEN METABOLIC PANEL: CPT

## 2023-11-07 PROCEDURE — 82607 VITAMIN B-12: CPT

## 2023-11-10 DIAGNOSIS — S62.631D CLOSED DISPLACED FRACTURE OF DISTAL PHALANX OF LEFT INDEX FINGER WITH ROUTINE HEALING, SUBSEQUENT ENCOUNTER: Primary | ICD-10-CM

## 2023-11-13 ENCOUNTER — PATIENT MESSAGE (OUTPATIENT)
Dept: FAMILY MEDICINE CLINIC | Age: 88
End: 2023-11-13
Payer: MEDICARE

## 2023-11-13 DIAGNOSIS — K64.9 HEMORRHOIDS, UNSPECIFIED HEMORRHOID TYPE: Primary | ICD-10-CM

## 2023-11-14 RX ORDER — HYDROCORTISONE ACETATE 25 MG/1
25 SUPPOSITORY RECTAL 2 TIMES DAILY PRN
Qty: 12 EACH | Refills: 1 | Status: SHIPPED | OUTPATIENT
Start: 2023-11-14

## 2023-11-22 ENCOUNTER — HOSPITAL ENCOUNTER (OUTPATIENT)
Dept: GENERAL RADIOLOGY | Facility: HOSPITAL | Age: 88
Discharge: HOME | End: 2023-11-22
Admitting: ORTHOPAEDIC SURGERY
Payer: MEDICARE

## 2023-11-22 DIAGNOSIS — S62.631D CLOSED DISPLACED FRACTURE OF DISTAL PHALANX OF LEFT INDEX FINGER WITH ROUTINE HEALING, SUBSEQUENT ENCOUNTER: ICD-10-CM

## 2023-11-22 PROCEDURE — 73130 X-RAY EXAM OF HAND: CPT

## 2023-11-27 ENCOUNTER — OFFICE VISIT (OUTPATIENT)
Dept: ORTHOPEDIC SURGERY | Facility: CLINIC | Age: 88
End: 2023-11-27
Payer: MEDICARE

## 2023-11-27 VITALS — WEIGHT: 147 LBS | HEIGHT: 67 IN | BODY MASS INDEX: 23.07 KG/M2

## 2023-11-27 DIAGNOSIS — S62.631K: Primary | ICD-10-CM

## 2023-11-27 PROCEDURE — 99213 OFFICE O/P EST LOW 20 MIN: CPT | Performed by: PHYSICIAN ASSISTANT

## 2023-11-27 PROCEDURE — 1160F RVW MEDS BY RX/DR IN RCRD: CPT | Performed by: PHYSICIAN ASSISTANT

## 2023-11-27 PROCEDURE — 1159F MED LIST DOCD IN RCRD: CPT | Performed by: PHYSICIAN ASSISTANT

## 2023-11-27 NOTE — PROGRESS NOTES
"Chief Complaint  Follow-up of the Left Hand    Subjective    History of Present Illness      Becky Zavala is a 89 y.o. female who presents to [facility] for 12 week follow up on displaced fracture of the proximal phalanx of the left index finger.  She denies any pain in the finger and is able to make a fist without problem. She has been released from physical therapy. She has a bone stimulator for her foot, provided by someone else, but it requires use for 3 hours a day and that is too bothersome.       HPI 10/18/23  8 week follow up on displaced fracture of the proximal phalanx of the left index finger. She has continued to use the brace that was fabricated by OT, Nelia Houston. She reports overall improvement in pain, but does report soreness/stiffness. She is doing therapy with PT with her foot and ankle specialist. She is using a bone stimulator on her foot and has also used it some on this finger.       HPI 9/13/23  4-week follow-up of displaced fracture of proximal phalanx of left index finger.  At last visit I referred her to occupational therapy at [email or web address] and a custom splint was fabricated by Nelia Houston OT. She reports she is still experiencing some pain, but overall it has improved.      HPI 8/8/23  Finger pain secondary to injury. She is accompanied by an adult female.  The adult female states that she fell forward out of her bed and landed on her hand Sunday morning. DOI was August 6, 2023. She reports the pain as a moderate pain, rated at 7/10 on pain scale. She describes the pain as an aching pain. She also reports associated stiffness, swelling, and bruising.   The patient has caretakers that come to her house.   The patient is utilizing Tylenol which has been effective.      Objective   Vital Signs:   Ht 170.2 cm (67\")   Wt 66.7 kg (147 lb)   BMI 23.02 kg/m²       Physical Exam  Vitals and nursing note reviewed.   Constitutional:       Appearance: Normal " appearance.   Pulmonary:      Effort: Pulmonary effort is normal.   Skin:     General: Skin is warm and dry.      Capillary Refill: Capillary refill takes less than 2 seconds.   Neurological:      General: No focal deficit present.      Mental Status: She is alert and oriented to person, place, and time. Mental status is at baseline.   Psychiatric:         Mood and Affect: Mood normal.         Behavior: Behavior normal.         Thought Content: Thought content normal.         Judgment: Judgment normal.       Ortho Exam   LEFT hand  She is right dominant.   There is no longer soft tissue swelling on the dorsal and volar aspects of the 2nd finger.  Capillary refill is 2 seconds with a brisk return.   Cascade of the fingers is fairly well preserved.   Patient is able to make a complete fist.  There is no tenderness of the fracture location.   There is no evidence of a compartmental syndrome.   There is no crossover deformity of the digit distally.       Result Review :   Radiologic studies - see below for interpretation  LEFT hand xrays   3 views were ordered by Josh Mckeon PA-C. Performed at [facility] on 11/22/2023. Images were independently viewed and interpreted by myself, my impression as follows:   Findings: Continued healing noted of the fracture at the proximal shaft of the 2nd phalanx, medially. No visible progression of healing at the lateral aspect of the finger fracture. Severe osteopenia noted throughout, this appears worsened in comparison to the hand xray from August.  Bony lesion: no  Soft tissues: within normal limits  Joint spaces: decreased  Hardware appropriately positioned: not applicable  Prior studies available for comparison: yes        PRIOR IMAGING  LEFT hand xrays   3 views were ordered by Josh Mckeon PA-C. Performed at [facility] on 10/18/2023. Images were independently viewed and interpreted by myself, my impression as follows:     Findings: Continued healing noted of the fracture at the proximal shaft of the 2nd phalanx-medially. There is still a visible fracture line on the lateral aspect of the finger.  Bony lesion: no  Soft tissues: increased  Joint spaces: decreased  Hardware appropriately positioned: not applicable  Prior studies available for comparison: yes        LEFT 2nd finger xrays   3 views were ordered by Josh Mckeon PA-C. Performed at [facility] on 9/13/2023. Images were independently viewed and interpreted by myself, my impression as follows:   Findings: slight healing/callus formation is noted at the displaced, extra-articular fracture of the proximal shaft of the 2nd phalanx  Bony lesion: no  Soft tissues: within normal limits  Joint spaces: decreased  Hardware appropriately positioned: not applicable  Prior studies available for comparison: yes       LEFT hand xrays   3 views were performed at [facility] on 8/7/23. Images were independently viewed and interpreted by myself, my impression as follows:  Findings: slightly displaced, extra articular fracture of the proximal shaft of the proximal phalanx of the 2nd finger. Moderate degenerative changes throughout the hand, except for the CMC joint of the thumb, which shows severe arthritic changes.          PROCEDURE  Procedures           Assessment   Assessment and Plan    Diagnoses and all orders for this visit:    1. 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 Discussion of orthopaedic goals.  May discontinue splint and continue with therapy  Risk, benefits, and merits of treatment alternatives reviewed with the patient.   Ice, heat, and/or modalities as beneficial  Patient is encouraged to call or return for any issues or concerns.  Recommend starting Exogen bone  stimulator for assistance with the nonunion fracture. There is no visible progression of healing on xrays and there is a fracture gap less than 1cm.  Patient was given instructions and counseling regarding her condition or for health maintenance advice. Please see specific information pulled into the AVS if appropriate.     Josh Mckeon PA-C   Date of Encounter: 11/27/2023     Electronically signed by Josh Mckeon PA-C, 11/27/23, 1:18 PM EST.

## 2023-12-12 DIAGNOSIS — F03.918 DEMENTIA WITH BEHAVIORAL DISTURBANCE: ICD-10-CM

## 2023-12-12 RX ORDER — GABAPENTIN 300 MG/1
300 CAPSULE ORAL 2 TIMES DAILY
Qty: 60 CAPSULE | Refills: 0 | Status: SHIPPED | OUTPATIENT
Start: 2023-12-12

## 2023-12-26 ENCOUNTER — TELEPHONE (OUTPATIENT)
Dept: ORTHOPEDIC SURGERY | Facility: CLINIC | Age: 88
End: 2023-12-26
Payer: MEDICARE

## 2023-12-26 NOTE — TELEPHONE ENCOUNTER
ATTEMPTED TO WARM TRANSFER    Caller: SRINIVAS YEBOAH    Relationship to patient: DAUGHTER (ON VERBAL RELEASE OF INFO)    Best call back number: [phone]    Chief complaint: PATIENT SAW VITALIY BARBOSA PA-C ON 11/27/23 AND WAS ADVISED TO F/U IN 2 MONTHS. PLEASE ADVISE.    Type of visit: F/U

## 2023-12-29 ENCOUNTER — LAB (OUTPATIENT)
Dept: LAB | Facility: HOSPITAL | Age: 88
End: 2023-12-29
Payer: MEDICARE

## 2023-12-29 ENCOUNTER — OFFICE VISIT (OUTPATIENT)
Dept: FAMILY MEDICINE CLINIC | Age: 88
End: 2023-12-29
Payer: MEDICARE

## 2023-12-29 VITALS
OXYGEN SATURATION: 97 % | SYSTOLIC BLOOD PRESSURE: 140 MMHG | DIASTOLIC BLOOD PRESSURE: 68 MMHG | HEIGHT: 67 IN | WEIGHT: 158 LBS | BODY MASS INDEX: 24.8 KG/M2 | HEART RATE: 62 BPM

## 2023-12-29 DIAGNOSIS — L98.9 SKIN LESION: ICD-10-CM

## 2023-12-29 DIAGNOSIS — E03.9 ACQUIRED HYPOTHYROIDISM: ICD-10-CM

## 2023-12-29 DIAGNOSIS — Z23 FLU VACCINE NEED: ICD-10-CM

## 2023-12-29 DIAGNOSIS — D69.6 PLATELETS DECREASED: ICD-10-CM

## 2023-12-29 DIAGNOSIS — K64.9 HEMORRHOIDS, UNSPECIFIED HEMORRHOID TYPE: ICD-10-CM

## 2023-12-29 DIAGNOSIS — I10 ESSENTIAL HYPERTENSION: Primary | ICD-10-CM

## 2023-12-29 PROBLEM — S62.631A CLOSED DISPLACED FRACTURE OF DISTAL PHALANX OF LEFT INDEX FINGER: Status: RESOLVED | Noted: 2023-10-18 | Resolved: 2023-12-29

## 2023-12-29 PROBLEM — K56.7 ILEUS: Status: RESOLVED | Noted: 2023-01-31 | Resolved: 2023-12-29

## 2023-12-29 PROBLEM — D72.829 LEUKOCYTOSIS: Status: RESOLVED | Noted: 2023-01-31 | Resolved: 2023-12-29

## 2023-12-29 PROBLEM — S62.611A: Status: RESOLVED | Noted: 2023-08-13 | Resolved: 2023-12-29

## 2023-12-29 LAB
BASOPHILS # BLD AUTO: 0.01 10*3/MM3 (ref 0–0.2)
BASOPHILS NFR BLD AUTO: 0.2 % (ref 0–1.5)
DEPRECATED RDW RBC AUTO: 44.5 FL (ref 37–54)
EOSINOPHIL # BLD AUTO: 0.07 10*3/MM3 (ref 0–0.4)
EOSINOPHIL NFR BLD AUTO: 1.2 % (ref 0.3–6.2)
ERYTHROCYTE [DISTWIDTH] IN BLOOD BY AUTOMATED COUNT: 13.2 % (ref 12.3–15.4)
HCT VFR BLD AUTO: 42.2 % (ref 34–46.6)
HGB BLD-MCNC: 13.1 G/DL (ref 12–15.9)
IMM GRANULOCYTES # BLD AUTO: 0.03 10*3/MM3 (ref 0–0.05)
IMM GRANULOCYTES NFR BLD AUTO: 0.5 % (ref 0–0.5)
LYMPHOCYTES # BLD AUTO: 1.47 10*3/MM3 (ref 0.7–3.1)
LYMPHOCYTES NFR BLD AUTO: 25.6 % (ref 19.6–45.3)
MCH RBC QN AUTO: 28.4 PG (ref 26.6–33)
MCHC RBC AUTO-ENTMCNC: 31 G/DL (ref 31.5–35.7)
MCV RBC AUTO: 91.3 FL (ref 79–97)
MONOCYTES # BLD AUTO: 0.54 10*3/MM3 (ref 0.1–0.9)
MONOCYTES NFR BLD AUTO: 9.4 % (ref 5–12)
NEUTROPHILS NFR BLD AUTO: 3.63 10*3/MM3 (ref 1.7–7)
NEUTROPHILS NFR BLD AUTO: 63.1 % (ref 42.7–76)
PLATELET # BLD AUTO: 139 10*3/MM3 (ref 140–450)
PMV BLD AUTO: 10.8 FL (ref 6–12)
RBC # BLD AUTO: 4.62 10*6/MM3 (ref 3.77–5.28)
WBC NRBC COR # BLD AUTO: 5.75 10*3/MM3 (ref 3.4–10.8)

## 2023-12-29 PROCEDURE — 85025 COMPLETE CBC W/AUTO DIFF WBC: CPT

## 2023-12-29 PROCEDURE — 36415 COLL VENOUS BLD VENIPUNCTURE: CPT

## 2023-12-29 PROCEDURE — 90662 IIV NO PRSV INCREASED AG IM: CPT | Performed by: NURSE PRACTITIONER

## 2023-12-29 PROCEDURE — G0008 ADMIN INFLUENZA VIRUS VAC: HCPCS | Performed by: NURSE PRACTITIONER

## 2023-12-29 PROCEDURE — 99214 OFFICE O/P EST MOD 30 MIN: CPT | Performed by: NURSE PRACTITIONER

## 2023-12-29 PROCEDURE — 84443 ASSAY THYROID STIM HORMONE: CPT

## 2023-12-29 PROCEDURE — 84466 ASSAY OF TRANSFERRIN: CPT

## 2023-12-29 PROCEDURE — 83540 ASSAY OF IRON: CPT

## 2023-12-29 RX ORDER — HYDROCORTISONE ACETATE 25 MG/1
25 SUPPOSITORY RECTAL 2 TIMES DAILY PRN
Qty: 12 EACH | Refills: 1 | Status: SHIPPED | OUTPATIENT
Start: 2023-12-29

## 2023-12-29 NOTE — PROGRESS NOTES
"Chief Complaint  Hypertension (6 month follow up ), Hyperlipidemia, Hypothyroidism, Paroxysmal A-fib, and Dementia with behavioral disturbance    Subjective          Becky Zavala presents to [facility]     Patient is an 89-year-old female who is here today with her daughter, Aimee.  Aimee has noticed a skin lesion on the right lower leg in the last few days that has been unchanged in size.  She is not certain of the origin of the lesion.  She has been applying some mupirocin ointment.  Area has not worsened.  Patient takes doxycycline on a daily basis long-term per infectious disease for previous infections.  She denies fever, or drainage from this lesion.  Photo was taken of the lesion.    Dr. Gutierrez is patient's primary care provider.  Her dosage of Synthroid was increased to 150 mcg daily after November 7 labs showed an elevated TSH at 8.87.  Dr. Gutierrez also made note to recheck her platelet level at visit this month.    Aimee request a refill of hydrocortisone suppositories to administer for hemorrhoid.  Hemorrhoid has not resolved with use of suppositories and Tucks pads thus far.  Defers referral to a surgeon at this time.     Objective   Vital Signs:   Vitals:    12/29/23 1257 12/29/23 1727   BP: 153/89 140/68   BP Location: Right arm Right arm   Patient Position: Sitting Sitting   Cuff Size: Adult Adult   Pulse: 62    SpO2: 97%    Weight: 71.7 kg (158 lb)    Height: 170.2 cm (67\")        Wt Readings from Last 3 Encounters:   12/29/23 71.7 kg (158 lb)   11/27/23 66.7 kg (147 lb)   10/18/23 66.7 kg (147 lb)      BP Readings from Last 3 Encounters:   12/29/23 140/68   08/07/23 145/60   06/19/23 160/82       Body mass index is 24.75 kg/m².    BMI is within normal parameters. No other follow-up for BMI required.       Physical Exam  Vitals reviewed.   Constitutional:       General: She is not in acute distress.     Appearance: Normal appearance. She is well-developed. "   Neck:      Thyroid: No thyromegaly.      Vascular: No carotid bruit.   Cardiovascular:      Rate and Rhythm: Normal rate and regular rhythm.      Pulses:           Posterior tibial pulses are 2+ on the right side and 2+ on the left side.      Heart sounds: Normal heart sounds.   Pulmonary:      Effort: Pulmonary effort is normal.      Breath sounds: Normal breath sounds.   Musculoskeletal:      Right lower leg: No edema.      Left lower leg: No edema.   Skin:     General: Skin is warm and dry.          Neurological:      General: No focal deficit present.      Mental Status: She is alert.   Psychiatric:         Attention and Perception: Attention normal.         Mood and Affect: Mood and affect normal.         Behavior: Behavior normal.           Current Outpatient Medications:     calcitriol (ROCALTROL) 0.5 MCG capsule, Take 1 capsule by mouth 2 (Two) Times a Day., Disp: 180 capsule, Rfl: 3    cyanocobalamin 1000 MCG/ML injection, Inject 1 mL into the appropriate muscle as directed by prescriber Every 30 (Thirty) Days., Disp: 3 mL, Rfl: 3    dabigatran etexilate (Pradaxa) 150 MG capsu, Take 1 capsule by mouth 2 (Two) Times a Day., Disp: 180 capsule, Rfl: 3    dilTIAZem CD (CARDIZEM CD) 120 MG 24 hr capsule, Take 1 capsule by mouth Daily., Disp: 90 capsule, Rfl: 3    doxycycline (VIBRAMYCIN) 100 MG capsule, Take 1 capsule by mouth 2 (Two) Times a Day., Disp: 180 capsule, Rfl: 3    furosemide (LASIX) 20 MG tablet, Take 1 tablet by mouth Daily., Disp: 30 tablet, Rfl: 1    gabapentin (NEURONTIN) 300 MG capsule, Take 1 capsule by mouth 2 (Two) Times a Day., Disp: 60 capsule, Rfl: 0    hydrocortisone (ANUSOL-HC) 25 MG suppository, Insert 1 suppository into the rectum 2 (Two) Times a Day As Needed for Hemorrhoids., Disp: 12 each, Rfl: 1    Melatonin 10 MG tablet, Take 1 tablet by mouth. PATIENT TAKES ONE EVERY EVENING/UNSURE IF TABLET OR CAPSULE, Disp: , Rfl:     Multiple Vitamins-Minerals (ICAPS AREDS 2 PO), Take 1  capsule by mouth 2 (Two) Times a Day., Disp: , Rfl:     mupirocin (BACTROBAN) 2 % ointment, APPLY TO THE AFFECTED AREA(S) topically TWICE DAILY, Disp: 22 g, Rfl: 0    potassium chloride (KLOR-CON) 20 MEQ CR tablet, Take 1 tablet by mouth Daily., Disp: 180 tablet, Rfl: 3    pravastatin (PRAVACHOL) 80 MG tablet, Take 1 tablet by mouth Every Night., Disp: 90 tablet, Rfl: 3    raloxifene (EVISTA) 60 MG tablet, Take 1 tablet by mouth Daily., Disp: 90 tablet, Rfl: 3    sertraline (ZOLOFT) 100 MG tablet, Take 1 tablet by mouth Daily., Disp: 90 tablet, Rfl: 3    Synthroid 150 MCG tablet, Take 1 tablet by mouth Daily., Disp: 90 tablet, Rfl: 3    timolol (TIMOPTIC) 0.5 % ophthalmic solution, Administer 1 drop to both eyes 2 (Two) Times a Day., Disp: , Rfl:     triamcinolone (KENALOG) 0.1 % cream, Apply 1 application  topically to the appropriate area as directed Daily As Needed., Disp: , Rfl:     vitamin D (ERGOCALCIFEROL) 1.25 MG (03967 UT) capsule capsule, Take 1 capsule by mouth 1 (One) Time Per Week., Disp: , Rfl:    Past Medical History:   Diagnosis Date    AAA (abdominal aortic aneurysm)     Alzheimer's disease, unspecified     Ankle sprain June 2022    Anxiety     Atrial fibrillation     Cataract     Cellulitis     Chronic atrial fibrillation     Chronic kidney disease     COPD (chronic obstructive pulmonary disease)     Coronary artery disease     Dementia     SLIGHT PER DAUGHTER    Disease of thyroid gland     Displaced fracture of proximal phalanx of left index finger, initial encounter for closed fracture 8/13/2023    Diverticulitis of colon 2008    Essential (primary) hypertension     Fracture, fibula 2016    Frequency of micturition     Glaucoma     Groin incision ulcer     SMALL OPEN AREA TO LEFT GROIN    Hemorrhage of anus and rectum     History of heart attack     HL (hearing loss)     Hyperlipidemia     Hypocalcemia     Hypothyroidism     Macular degeneration     MSSA (methicillin susceptible Staphylococcus  aureus) infection     CAUSE OF ANEURSYM PER DAUGHTER STATED    Myocardial infarction     Neck pain     LOWER     Neoplasm of uncertain behavior of skin     Neuropathy     Osteopenia     Osteoporosis     Other hypoparathyroidism     Other vitamin B12 deficiency anemias     PONV (postoperative nausea and vomiting)     Popliteal aneurysm     LEFT    Pressure sore     LEFT HEEL DRESSING INTACT    PVD (peripheral vascular disease)     UTI (urinary tract infection)     Visual impairment      Allergies   Allergen Reactions    Ciprofloxacin Other (See Comments)     neck pain; lips and eyes swelled    Other      ALL DRUGS IN THE FLUOROQUINOLONES               Result Review :     Common labs          4/21/2023    16:02 11/7/2023    10:51 12/29/2023    13:55   Common Labs   Glucose 94  106     BUN 22  23     Creatinine 0.81  0.81     Sodium 142  142     Potassium 4.5  4.3     Chloride 107  106     Calcium 9.8  9.2     Albumin 3.1  3.5     Total Bilirubin 0.5  0.5     Alkaline Phosphatase 68  57     AST (SGOT) 23  21     ALT (SGPT) 19  15     WBC 7.24  5.64  5.75    Hemoglobin 12.4  12.6  13.1    Hematocrit 39.9  41.2  42.2    Platelets 157  116  139    Total Cholesterol  137     Triglycerides  95     HDL Cholesterol  36     LDL Cholesterol   83          XR Hand 3+ View Left    Result Date: 11/22/2023   No acute fractures.  Osteopenia.  Subacute fracture of the proximal phalanx index finger.  Remote fracture of the 5th metacarpal.      AMIE GOODSON MD       Electronically Signed and Approved By: AMIE GOODSON MD on 11/22/2023 at 12:57             XR Finger 2+ View Left    Result Date: 10/18/2023   Incomplete on going healing of the mildly displaced fracture of the proximal metadiaphysis of the 2nd proximal phalanx left hand.      AMIE GOODSON MD       Electronically Signed and Approved By: AMIE GOODSON MD on 10/18/2023 at 15:18             XR Finger 2+ View Left    Result Date: 9/13/2023    Grossly stable alignment of the healing  proximal 2nd phalanx fracture.      HOANG RAZO MD       Electronically Signed and Approved By: HOANG RAZO MD on 9/13/2023 at 15:03                      Social History     Tobacco Use   Smoking Status Never   Smokeless Tobacco Never   Tobacco Comments    3 cig. A day           Assessment and Plan    Diagnoses and all orders for this visit:    1. Essential hypertension (Primary)  Assessment & Plan:  Monitor blood pressure at home and report any elevated readings.  Patient is in no need of any routine medicine refills at this time.      2. Skin lesion  Assessment & Plan:  Scant surrounding erythema without drainage.  Already on doxycycline but has been long-term.  Apply mupirocin if area is not worsening.  Monitor closely.      3. Hemorrhoids, unspecified hemorrhoid type  -     hydrocortisone (ANUSOL-HC) 25 MG suppository; Insert 1 suppository into the rectum 2 (Two) Times a Day As Needed for Hemorrhoids.  Dispense: 12 each; Refill: 1    4. Platelets decreased  -     CBC w AUTO Differential; Future  -     Iron and TIBC; Future    5. Acquired hypothyroidism  Assessment & Plan:  Further treatment recommendations pending lab results.  A little early to recheck but we are checking her platelet level today.  Therefore we will go ahead and collect TSH.    Orders:  -     TSH Rfx On Abnormal To Free T4; Future    6. Flu vaccine need  -     Fluzone High-Dose 65+yrs ([id number])        Follow Up    No follow-ups on file.  Patient was given instructions and counseling regarding her condition or for health maintenance advice. Please see specific information pulled into the AVS if appropriate.

## 2023-12-29 NOTE — ASSESSMENT & PLAN NOTE
Further treatment recommendations pending lab results.  A little early to recheck but we are checking her platelet level today.  Therefore we will go ahead and collect TSH.

## 2023-12-29 NOTE — ASSESSMENT & PLAN NOTE
Scant surrounding erythema without drainage.  Already on doxycycline but has been long-term.  Apply mupirocin if area is not worsening.  Monitor closely.

## 2023-12-29 NOTE — ASSESSMENT & PLAN NOTE
Monitor blood pressure at home and report any elevated readings.  Patient is in no need of any routine medicine refills at this time.

## 2023-12-30 LAB
IRON 24H UR-MRATE: 58 MCG/DL (ref 37–145)
IRON SATN MFR SERPL: 21 % (ref 20–50)
TIBC SERPL-MCNC: 273 MCG/DL (ref 298–536)
TRANSFERRIN SERPL-MCNC: 183 MG/DL (ref 200–360)
TSH SERPL DL<=0.05 MIU/L-ACNC: 4.08 UIU/ML (ref 0.27–4.2)

## 2023-12-30 NOTE — PROGRESS NOTES
I have reviewed this visit note.  Pod A, please reach out to them.  If they would like me to take a look at the area this week, I would be willing to see them at no additional charge to eyeball it.  I believe they primarily came in for evaluation of the leg.  Thanks.

## 2024-01-08 NOTE — TELEPHONE ENCOUNTER
Call returned to patient's daughter, Aimee.   Advised that patient can wait for an appointment when she has had the bone stimulator no less than 2 months per Josh's instructions.     Daughter understand that Dr Dodd and Josh will no longer be with [facility] after 1/15/24.  She has been given instructions of how to make an appointment with [facility] for follow up should they need that.     [email or web address]

## 2024-01-11 DIAGNOSIS — F03.918 DEMENTIA WITH BEHAVIORAL DISTURBANCE: ICD-10-CM

## 2024-01-11 RX ORDER — GABAPENTIN 300 MG/1
300 CAPSULE ORAL 2 TIMES DAILY
Qty: 60 CAPSULE | Refills: 0 | Status: SHIPPED | OUTPATIENT
Start: 2024-01-11

## 2024-01-22 ENCOUNTER — PATIENT MESSAGE (OUTPATIENT)
Dept: FAMILY MEDICINE CLINIC | Age: 89
End: 2024-01-22
Payer: MEDICARE

## 2024-01-22 DIAGNOSIS — I48.0 PAROXYSMAL A-FIB: ICD-10-CM

## 2024-01-23 RX ORDER — POTASSIUM CHLORIDE 20 MEQ/1
20 TABLET, EXTENDED RELEASE ORAL 2 TIMES DAILY
Qty: 180 TABLET | Refills: 3 | Status: SHIPPED | OUTPATIENT
Start: 2024-02-22

## 2024-01-23 RX ORDER — POTASSIUM CHLORIDE 20 MEQ/1
20 TABLET, EXTENDED RELEASE ORAL 2 TIMES DAILY
Qty: 60 TABLET | Refills: 0 | Status: SHIPPED | OUTPATIENT
Start: 2024-01-23

## 2024-02-09 ENCOUNTER — PATIENT MESSAGE (OUTPATIENT)
Dept: FAMILY MEDICINE CLINIC | Age: 89
End: 2024-02-09
Payer: MEDICARE

## 2024-02-09 DIAGNOSIS — F03.918 DEMENTIA WITH BEHAVIORAL DISTURBANCE: ICD-10-CM

## 2024-02-09 RX ORDER — GABAPENTIN 300 MG/1
300 CAPSULE ORAL 2 TIMES DAILY
Qty: 60 CAPSULE | Refills: 0 | Status: SHIPPED | OUTPATIENT
Start: 2024-02-09

## 2024-02-09 NOTE — TELEPHONE ENCOUNTER
Spoke to pt daughter, pt now has [employer] insurance and isn't sure what they are going to do, will call us back regarding scheduling an appointment.

## 2024-02-09 NOTE — TELEPHONE ENCOUNTER
I have reviewed Christian and sent a single refill on this.  Please schedule a follow-up visit with me.  It has been over 6 months since I saw her most recently.  Thanks.

## 2024-02-09 NOTE — TELEPHONE ENCOUNTER
I am willing to do a no charge visit, but I still need to see her.  There was no discussion at her December visit regarding gabapentin.  Because it is a controlled substance, I am responsible to oversee its use.  Thanks.

## 2024-02-19 ENCOUNTER — TELEMEDICINE (OUTPATIENT)
Dept: FAMILY MEDICINE CLINIC | Age: 89
End: 2024-02-19
Payer: MEDICARE

## 2024-02-19 DIAGNOSIS — E78.2 MIXED HYPERLIPIDEMIA: ICD-10-CM

## 2024-02-19 DIAGNOSIS — T14.8XXA ABRASION: ICD-10-CM

## 2024-02-19 DIAGNOSIS — E20.9 HYPOPARATHYROIDISM, UNSPECIFIED HYPOPARATHYROIDISM TYPE: ICD-10-CM

## 2024-02-19 DIAGNOSIS — E03.9 ACQUIRED HYPOTHYROIDISM: ICD-10-CM

## 2024-02-19 DIAGNOSIS — E53.8 B12 DEFICIENCY: ICD-10-CM

## 2024-02-19 DIAGNOSIS — I48.0 PAROXYSMAL A-FIB: ICD-10-CM

## 2024-02-19 DIAGNOSIS — F03.918 DEMENTIA WITH BEHAVIORAL DISTURBANCE: Primary | ICD-10-CM

## 2024-02-19 PROCEDURE — 99212 OFFICE O/P EST SF 10 MIN: CPT | Performed by: FAMILY MEDICINE

## 2024-02-19 PROCEDURE — 1160F RVW MEDS BY RX/DR IN RCRD: CPT | Performed by: FAMILY MEDICINE

## 2024-02-19 PROCEDURE — 1159F MED LIST DOCD IN RCRD: CPT | Performed by: FAMILY MEDICINE

## 2024-02-19 RX ORDER — RALOXIFENE HYDROCHLORIDE 60 MG/1
60 TABLET, FILM COATED ORAL DAILY
Qty: 90 TABLET | Refills: 3 | Status: SHIPPED | OUTPATIENT
Start: 2024-02-19

## 2024-02-19 RX ORDER — LEVOTHYROXINE SODIUM 150 MCG
150 TABLET ORAL DAILY
Qty: 90 TABLET | Refills: 3 | Status: SHIPPED | OUTPATIENT
Start: 2024-02-19

## 2024-02-19 RX ORDER — PRAVASTATIN SODIUM 80 MG/1
80 TABLET ORAL NIGHTLY
Qty: 90 TABLET | Refills: 3 | Status: SHIPPED | OUTPATIENT
Start: 2024-02-19

## 2024-02-19 RX ORDER — CALCITRIOL 0.5 UG/1
0.5 CAPSULE, LIQUID FILLED ORAL 2 TIMES DAILY
Qty: 180 CAPSULE | Refills: 3 | Status: SHIPPED | OUTPATIENT
Start: 2024-02-19

## 2024-02-19 RX ORDER — GABAPENTIN 300 MG/1
300 CAPSULE ORAL 2 TIMES DAILY
Qty: 180 CAPSULE | Refills: 1 | Status: SHIPPED | OUTPATIENT
Start: 2024-02-19

## 2024-02-19 RX ORDER — DILTIAZEM HYDROCHLORIDE 120 MG/1
120 CAPSULE, COATED, EXTENDED RELEASE ORAL DAILY
Qty: 90 CAPSULE | Refills: 3 | Status: SHIPPED | OUTPATIENT
Start: 2024-02-19

## 2024-02-19 RX ORDER — DABIGATRAN ETEXILATE 150 MG/1
150 CAPSULE ORAL 2 TIMES DAILY
Qty: 180 CAPSULE | Refills: 3 | Status: SHIPPED | OUTPATIENT
Start: 2024-02-19

## 2024-02-19 RX ORDER — DOXYCYCLINE HYCLATE 100 MG/1
100 CAPSULE ORAL 2 TIMES DAILY
Qty: 180 CAPSULE | Refills: 3 | Status: SHIPPED | OUTPATIENT
Start: 2024-02-19

## 2024-02-19 RX ORDER — CYANOCOBALAMIN 1000 UG/ML
1000 INJECTION, SOLUTION INTRAMUSCULAR; SUBCUTANEOUS
Qty: 3 ML | Refills: 3 | Status: SHIPPED | OUTPATIENT
Start: 2024-02-19

## 2024-02-19 RX ORDER — SERTRALINE HYDROCHLORIDE 100 MG/1
100 TABLET, FILM COATED ORAL DAILY
Qty: 90 TABLET | Refills: 3 | Status: SHIPPED | OUTPATIENT
Start: 2024-02-19

## 2024-02-19 NOTE — PROGRESS NOTES
Becky Zavala presents to [facility].    Chief Complaint:  Dementia follow up    TELEHEALTH VISIT:   - consented to this telehealth visit.   - Persons on the call include:  patient -Trinity, her daughter - AimeeDr. Matt camacho   - The patient is at home.  I am at the office.   - This visit is being conducted over Zoom with audio and video capability.   - This visit is being done remotely due to the ongoing COVID-19 pandemic.    Subjective   History of Present Illness:  Trinity is being seen today for follow-up on her care.  She had an appointment with PEARL Medina at the end of the year, but discussion was not held regarding the use of gabapentin.  She is here primarily for us to refill that medication.  Trinity's care has been stable most recently.  Her dementia is significant, and she does have a caregiver.    Review of Systems:  Review of Systems   Constitutional:  Negative for chills and fever.   Respiratory:  Negative for cough and shortness of breath.    Cardiovascular:  Negative for chest pain and palpitations.   Gastrointestinal:  Negative for abdominal pain, nausea and vomiting.      Objective   Medical History:  Past Medical History:    AAA (abdominal aortic aneurysm)    Alzheimer's disease, unspecified    Ankle sprain    Anxiety    Atrial fibrillation    Cataract    Cellulitis    Chronic atrial fibrillation    Chronic kidney disease    COPD (chronic obstructive pulmonary disease)    Coronary artery disease    Dementia    SLIGHT PER DAUGHTER    Disease of thyroid gland    Displaced fracture of proximal phalanx of left index finger, initial encounter for closed fracture    Diverticulitis of colon    Essential (primary) hypertension    Fracture, fibula    Frequency of micturition    Glaucoma    Groin incision ulcer    SMALL OPEN AREA TO LEFT GROIN    Hemorrhage of anus and rectum    History of heart attack    HL (hearing loss)    Hyperlipidemia    Hypocalcemia    Hypothyroidism     Macular degeneration    MSSA (methicillin susceptible Staphylococcus aureus) infection    CAUSE OF ANEURSYM PER DAUGHTER STATED    Myocardial infarction    Neck pain    LOWER     Neoplasm of uncertain behavior of skin    Neuropathy    Osteopenia    Osteoporosis    Other hypoparathyroidism    Other vitamin B12 deficiency anemias    PONV (postoperative nausea and vomiting)    Popliteal aneurysm    LEFT    Pressure sore    LEFT HEEL DRESSING INTACT    PVD (peripheral vascular disease)    UTI (urinary tract infection)    Visual impairment     Past Surgical History:    APPENDECTOMY    CHOLECYSTECTOMY    COLONOSCOPY    COLONOSCOPY    NBIH, diverticulosis, multiple polyps benign per path     ENDOSCOPY    normal     EYE SURGERY    FRACTURE SURGERY    HYSTERECTOMY    ORIF FEMUR FRACTURE    left tib fib    PA DIR RPR ANEURYSM & GRAFT COMMON FEMORAL ARTERY    Procedure: REPAIR OF LEFT MYCOTIC POPLITEAL ANEURYSM WITH ARTEGRAFT GRAFT, INTERPOSITION;  Surgeon: Randy Ott MD;  Location: [facility];  Service: Vascular    PA IN-SITU VEIN BYPASS FEMORAL-POPLITEAL    Procedure: RESECTION OF INFECTED LEFT FEMORAL ANEURYSM, EXTERNAL ILIAC TO SFA BYPASS WITH REIMPLANTATION PROFUNDA, AIF ARTERIOGRAM WITH LEFT LEG RUNOFF, SARTORIUS MUSCLE FLAP;  Surgeon: Randy Ott MD;  Location: [facility];  Service: Vascular    THYROID SURGERY    UPPER GASTROINTESTINAL ENDOSCOPY      Family History   Problem Relation Age of Onset    Breast cancer Daughter     Other Daughter         anaphalaxysis    Cancer Daughter     Cancer Daughter      Social History     Tobacco Use    Smoking status: Never    Smokeless tobacco: Never    Tobacco comments:     3 cig. A day   Substance Use Topics    Alcohol use: Not Currently       Health Maintenance Due   Topic Date Due    RSV Vaccine - Adults (1 - 1-dose 60+ series) Never done    COVID-19 Vaccine (6 - 2023-24 season) 09/01/2023        Immunization History   Administered Date(s) Administered     COVID-19 (PFIZER) BIVALENT 12+YRS 12/16/2022, 06/19/2023    COVID-19 (PFIZER) Purple Cap Monovalent 03/22/2021, 04/12/2021, 11/11/2021    Flu Vaccine Intradermal Quad 18-64YR 01/23/2013    Fluzone High Dose =>65 Years ([employer] ONLY) 11/17/2017    Fluzone High-Dose 65+yrs 12/16/2022, 12/29/2023    Fluzone Quad >6mos (Multi-dose) 11/18/2016    Influenza, Unspecified 05/24/2021    Pneumococcal Conjugate 13-Valent (PCV13) 03/16/2015    Pneumococcal Polysaccharide (PPSV23) 11/09/2010    Shingrix 01/21/2022, 04/19/2022    Td (TDVAX) 07/08/2018    Tdap 07/08/2018, 07/08/2018       Allergies   Allergen Reactions    Ciprofloxacin Other (See Comments)     neck pain; lips and eyes swelled    Other      ALL DRUGS IN THE FLUOROQUINOLONES        Medications:  Current Outpatient Medications on File Prior to Visit   Medication Sig    furosemide (LASIX) 20 MG tablet Take 1 tablet by mouth Daily.    hydrocortisone (ANUSOL-HC) 25 MG suppository Insert 1 suppository into the rectum 2 (Two) Times a Day As Needed for Hemorrhoids.    Melatonin 10 MG tablet Take 1 tablet by mouth. PATIENT TAKES ONE EVERY EVENING/UNSURE IF TABLET OR CAPSULE    Multiple Vitamins-Minerals (ICAPS AREDS 2 PO) Take 1 capsule by mouth 2 (Two) Times a Day.    [START ON 2/22/2024] potassium chloride (K-DUR,KLOR-CON) 20 MEQ CR tablet Take 1 tablet by mouth 2 (Two) Times a Day.    timolol (TIMOPTIC) 0.5 % ophthalmic solution Administer 1 drop to both eyes 2 (Two) Times a Day.    triamcinolone (KENALOG) 0.1 % cream Apply 1 Application topically to the appropriate area as directed Daily As Needed.    vitamin D3 125 MCG (5000 UT) capsule capsule Take 1 capsule by mouth Daily.    [DISCONTINUED] calcitriol (ROCALTROL) 0.5 MCG capsule Take 1 capsule by mouth 2 (Two) Times a Day.    [DISCONTINUED] cyanocobalamin 1000 MCG/ML injection Inject 1 mL into the appropriate muscle as directed by prescriber Every 30 (Thirty) Days.    [DISCONTINUED] dabigatran etexilate (Pradaxa) 150  MG capsu Take 1 capsule by mouth 2 (Two) Times a Day.    [DISCONTINUED] dilTIAZem CD (CARDIZEM CD) 120 MG 24 hr capsule Take 1 capsule by mouth Daily.    [DISCONTINUED] doxycycline (VIBRAMYCIN) 100 MG capsule Take 1 capsule by mouth 2 (Two) Times a Day.    [DISCONTINUED] gabapentin (NEURONTIN) 300 MG capsule Take 1 capsule by mouth 2 (Two) Times a Day.    [DISCONTINUED] mupirocin (BACTROBAN) 2 % ointment APPLY TO THE AFFECTED AREA(S) topically TWICE DAILY    [DISCONTINUED] pravastatin (PRAVACHOL) 80 MG tablet Take 1 tablet by mouth Every Night.    [DISCONTINUED] raloxifene (EVISTA) 60 MG tablet Take 1 tablet by mouth Daily.    [DISCONTINUED] sertraline (ZOLOFT) 100 MG tablet Take 1 tablet by mouth Daily.    [DISCONTINUED] Synthroid 150 MCG tablet Take 1 tablet by mouth Daily.    vitamin D (ERGOCALCIFEROL) 1.25 MG (66888 UT) capsule capsule Take 1 capsule by mouth 1 (One) Time Per Week.    [DISCONTINUED] potassium chloride (KLOR-CON) 20 MEQ CR tablet Take 1 tablet by mouth 2 (Two) Times a Day.     No current facility-administered medications on file prior to visit.       Vital Signs:   There were no vitals taken for this visit.      Physical Exam:  Physical Exam  Vitals reviewed.   Constitutional:       General: She is not in acute distress.     Appearance: She is not ill-appearing.   Eyes:      Pupils: Pupils are equal, round, and reactive to light.   Cardiovascular:      Rate and Rhythm: Regular rhythm.   Pulmonary:      Effort: Pulmonary effort is normal.   Lymphadenopathy:      Cervical: No cervical adenopathy (No visible finding).   Skin:     Findings: No lesion or rash.   Neurological:      Mental Status: She is alert. Mental status is at baseline. She is disoriented.   Psychiatric:         Mood and Affect: Mood normal.         Result Review   The following data was reviewed by Surjit Gutierrez MD on 02/19/2024.  Lab Results   Component Value Date    WBC 5.75 12/29/2023    HGB 13.1 12/29/2023    HCT 42.2  12/29/2023    MCV 91.3 12/29/2023     (L) 12/29/2023     Lab Results   Component Value Date    GLUCOSE 106 (H) 11/07/2023    BUN 23 11/07/2023    CREATININE 0.81 11/07/2023     11/07/2023    K 4.3 11/07/2023     11/07/2023    CO2 30.7 (H) 11/07/2023    CALCIUM 9.2 11/07/2023    PROTEINTOT 5.9 (L) 11/07/2023    ALBUMIN 3.5 11/07/2023    ALT 15 11/07/2023    AST 21 11/07/2023    ALKPHOS 57 11/07/2023    BILITOT 0.5 11/07/2023    EGFR 69.5 11/07/2023    GLOB 2.4 11/07/2023    AGRATIO 1.5 11/07/2023    BCR 28.4 (H) 11/07/2023    ANIONGAP 5.3 11/07/2023      Lab Results   Component Value Date    CHOL 137 11/07/2023    CHLPL 159 05/24/2021    TRIG 95 11/07/2023    HDL 36 (L) 11/07/2023    LDL 83 11/07/2023     Lab Results   Component Value Date    TSH 4.080 12/29/2023     Lab Results   Component Value Date    HGBA1C 5.70 (H) 12/16/2022          Assessment and Plan:   Today, we have briefly reviewed her care.  Her use of gabapentin is reasonable, and will be refilled as noted.  Other medications will be updated as well.  Tentatively, we will see her back in about 6 months for recheck and wellness exam.    Diagnoses and all orders for this visit:    1. Dementia with behavioral disturbance (Primary)  -     gabapentin (NEURONTIN) 300 MG capsule; Take 1 capsule by mouth 2 (Two) Times a Day.  Dispense: 180 capsule; Refill: 1  -     sertraline (ZOLOFT) 100 MG tablet; Take 1 tablet by mouth Daily.  Dispense: 90 tablet; Refill: 3    2. Hypoparathyroidism, unspecified hypoparathyroidism type  -     calcitriol (ROCALTROL) 0.5 MCG capsule; Take 1 capsule by mouth 2 (Two) Times a Day.  Dispense: 180 capsule; Refill: 3    3. B12 deficiency  -     cyanocobalamin 1000 MCG/ML injection; Inject 1 mL into the appropriate muscle as directed by prescriber Every 30 (Thirty) Days.  Dispense: 3 mL; Refill: 3    4. Paroxysmal A-fib  -     dabigatran etexilate (Pradaxa) 150 MG capsu; Take 1 capsule by mouth 2 (Two) Times a Day.   Dispense: 180 capsule; Refill: 3  -     dilTIAZem CD (CARDIZEM CD) 120 MG 24 hr capsule; Take 1 capsule by mouth Daily.  Dispense: 90 capsule; Refill: 3    5. Mixed hyperlipidemia  -     pravastatin (PRAVACHOL) 80 MG tablet; Take 1 tablet by mouth Every Night.  Dispense: 90 tablet; Refill: 3    6. Acquired hypothyroidism  -     Synthroid 150 MCG tablet; Take 1 tablet by mouth Daily. Brand name only.  Dispense: 90 tablet; Refill: 3    7. Abrasion  -     mupirocin (BACTROBAN) 2 % ointment; Apply  topically to the appropriate area as directed 2 (Two) Times a Day.  Dispense: 30 g; Refill: 1    Other orders  -     doxycycline (VIBRAMYCIN) 100 MG capsule; Take 1 capsule by mouth 2 (Two) Times a Day.  Dispense: 180 capsule; Refill: 3  -     raloxifene (EVISTA) 60 MG tablet; Take 1 tablet by mouth Daily.  Dispense: 90 tablet; Refill: 3    Follow Up  Return in about 6 months (around 8/19/2024) for Recheck, Medicare Wellness.  Patient was given instructions and counseling regarding her condition or for health maintenance advice. Please see specific information pulled into the AVS if appropriate.

## 2024-02-19 NOTE — Clinical Note
Please TICKLE for Christian in 90 days.  Also, call her daughter Aimee, and arrange follow-up visit/Medicare wellness exam in about 6 months.  Thanks.

## 2024-03-12 DIAGNOSIS — F03.918 DEMENTIA WITH BEHAVIORAL DISTURBANCE: ICD-10-CM

## 2024-03-12 NOTE — TELEPHONE ENCOUNTER
Please clarify on this.  According to the Christian, #180 was filled on 3/4/24 through [facility].  Thanks.

## 2024-03-18 RX ORDER — GABAPENTIN 300 MG/1
300 CAPSULE ORAL 2 TIMES DAILY
Qty: 60 CAPSULE | Refills: 0 | OUTPATIENT
Start: 2024-03-18

## 2024-03-22 ENCOUNTER — PATIENT MESSAGE (OUTPATIENT)
Dept: FAMILY MEDICINE CLINIC | Age: 89
End: 2024-03-22
Payer: MEDICARE

## 2024-03-22 DIAGNOSIS — R19.7 DIARRHEA, UNSPECIFIED TYPE: Primary | ICD-10-CM

## 2024-04-01 DIAGNOSIS — I73.9 PERIPHERAL VASCULAR DISEASE, UNSPECIFIED: ICD-10-CM

## 2024-04-01 DIAGNOSIS — I72.4 ANEURYSM OF ARTERY OF LOWER EXTREMITY: Primary | ICD-10-CM

## 2024-04-08 DIAGNOSIS — F03.918 DEMENTIA WITH BEHAVIORAL DISTURBANCE: ICD-10-CM

## 2024-04-08 NOTE — TELEPHONE ENCOUNTER
Please clarify whether refill is actually needed.  Quantity #180 was sent to [facility] last month and was filled apparently.  Thanks.

## 2024-04-11 RX ORDER — GABAPENTIN 300 MG/1
300 CAPSULE ORAL 2 TIMES DAILY
Qty: 60 CAPSULE | Refills: 0 | OUTPATIENT
Start: 2024-04-11

## 2024-04-13 DIAGNOSIS — F03.918 DEMENTIA WITH BEHAVIORAL DISTURBANCE: ICD-10-CM

## 2024-04-15 RX ORDER — GABAPENTIN 300 MG/1
300 CAPSULE ORAL 2 TIMES DAILY
Qty: 60 CAPSULE | Refills: 0 | OUTPATIENT
Start: 2024-04-15

## 2024-04-20 DIAGNOSIS — T14.8XXA ABRASION: ICD-10-CM

## 2024-05-08 ENCOUNTER — TELEPHONE (OUTPATIENT)
Dept: FAMILY MEDICINE CLINIC | Age: 89
End: 2024-05-08
Payer: MEDICARE

## 2024-05-20 ENCOUNTER — DOCUMENTATION (OUTPATIENT)
Dept: FAMILY MEDICINE CLINIC | Age: 89
End: 2024-05-20
Payer: MEDICARE

## 2024-06-10 DIAGNOSIS — T14.8XXA ABRASION: ICD-10-CM

## 2024-08-04 DIAGNOSIS — T14.8XXA ABRASION: ICD-10-CM

## 2024-08-20 DIAGNOSIS — I48.0 PAROXYSMAL A-FIB: ICD-10-CM

## 2024-08-20 RX ORDER — POTASSIUM CHLORIDE 1500 MG/1
20 TABLET, EXTENDED RELEASE ORAL DAILY
Qty: 90 TABLET | Refills: 0 | Status: SHIPPED | OUTPATIENT
Start: 2024-08-20

## 2024-08-23 ENCOUNTER — OFFICE VISIT (OUTPATIENT)
Dept: FAMILY MEDICINE CLINIC | Age: 89
End: 2024-08-23
Payer: MEDICARE

## 2024-08-23 ENCOUNTER — LAB (OUTPATIENT)
Dept: LAB | Facility: HOSPITAL | Age: 89
End: 2024-08-23
Payer: MEDICARE

## 2024-08-23 VITALS
TEMPERATURE: 97.3 F | OXYGEN SATURATION: 100 % | DIASTOLIC BLOOD PRESSURE: 69 MMHG | SYSTOLIC BLOOD PRESSURE: 131 MMHG | WEIGHT: 151.6 LBS | HEIGHT: 67 IN | BODY MASS INDEX: 23.79 KG/M2 | HEART RATE: 62 BPM

## 2024-08-23 DIAGNOSIS — E20.9 HYPOPARATHYROIDISM, UNSPECIFIED HYPOPARATHYROIDISM TYPE: ICD-10-CM

## 2024-08-23 DIAGNOSIS — E03.9 ACQUIRED HYPOTHYROIDISM: ICD-10-CM

## 2024-08-23 DIAGNOSIS — I71.40 ABDOMINAL AORTIC ANEURYSM (AAA) WITHOUT RUPTURE, UNSPECIFIED PART: ICD-10-CM

## 2024-08-23 DIAGNOSIS — Z00.00 PHYSICAL EXAM: Primary | ICD-10-CM

## 2024-08-23 DIAGNOSIS — F03.918 DEMENTIA WITH BEHAVIORAL DISTURBANCE: ICD-10-CM

## 2024-08-23 DIAGNOSIS — D69.6 PLATELETS DECREASED: ICD-10-CM

## 2024-08-23 DIAGNOSIS — E53.8 B12 DEFICIENCY: ICD-10-CM

## 2024-08-23 DIAGNOSIS — I48.0 PAROXYSMAL A-FIB: ICD-10-CM

## 2024-08-23 DIAGNOSIS — E78.2 MIXED HYPERLIPIDEMIA: ICD-10-CM

## 2024-08-23 DIAGNOSIS — K64.9 HEMORRHOIDS, UNSPECIFIED HEMORRHOID TYPE: ICD-10-CM

## 2024-08-23 DIAGNOSIS — R73.9 HYPERGLYCEMIA: ICD-10-CM

## 2024-08-23 LAB
25(OH)D3 SERPL-MCNC: 94 NG/ML (ref 30–100)
ALBUMIN SERPL-MCNC: 3.7 G/DL (ref 3.5–5.2)
ALBUMIN/GLOB SERPL: 1.8 G/DL
ALP SERPL-CCNC: 74 U/L (ref 39–117)
ALT SERPL W P-5'-P-CCNC: 14 U/L (ref 1–33)
ANION GAP SERPL CALCULATED.3IONS-SCNC: 9 MMOL/L (ref 5–15)
AST SERPL-CCNC: 22 U/L (ref 1–32)
BILIRUB SERPL-MCNC: 0.5 MG/DL (ref 0–1.2)
BUN SERPL-MCNC: 28 MG/DL (ref 8–23)
BUN/CREAT SERPL: 26.9 (ref 7–25)
CALCIUM SPEC-SCNC: 9.8 MG/DL (ref 8.2–9.6)
CHLORIDE SERPL-SCNC: 107 MMOL/L (ref 98–107)
CHOLEST SERPL-MCNC: 141 MG/DL (ref 0–200)
CO2 SERPL-SCNC: 30 MMOL/L (ref 22–29)
CREAT SERPL-MCNC: 1.04 MG/DL (ref 0.57–1)
DEPRECATED RDW RBC AUTO: 46.4 FL (ref 37–54)
EGFRCR SERPLBLD CKD-EPI 2021: 51.2 ML/MIN/1.73
ERYTHROCYTE [DISTWIDTH] IN BLOOD BY AUTOMATED COUNT: 13.7 % (ref 12.3–15.4)
FOLATE SERPL-MCNC: 18.1 NG/ML (ref 4.78–24.2)
GLOBULIN UR ELPH-MCNC: 2.1 GM/DL
GLUCOSE SERPL-MCNC: 154 MG/DL (ref 65–99)
HBA1C MFR BLD: 5.7 % (ref 4.8–5.6)
HCT VFR BLD AUTO: 42.9 % (ref 34–46.6)
HDLC SERPL-MCNC: 29 MG/DL (ref 40–60)
HGB BLD-MCNC: 13.3 G/DL (ref 12–15.9)
LDLC SERPL CALC-MCNC: 84 MG/DL (ref 0–100)
LDLC/HDLC SERPL: 2.78 {RATIO}
MCH RBC QN AUTO: 28.2 PG (ref 26.6–33)
MCHC RBC AUTO-ENTMCNC: 31 G/DL (ref 31.5–35.7)
MCV RBC AUTO: 90.9 FL (ref 79–97)
PLATELET # BLD AUTO: 114 10*3/MM3 (ref 140–450)
PMV BLD AUTO: 10.6 FL (ref 6–12)
POTASSIUM SERPL-SCNC: 4 MMOL/L (ref 3.5–5.2)
PROT SERPL-MCNC: 5.8 G/DL (ref 6–8.5)
PTH-INTACT SERPL-MCNC: 3 PG/ML (ref 15–65)
RBC # BLD AUTO: 4.72 10*6/MM3 (ref 3.77–5.28)
SODIUM SERPL-SCNC: 146 MMOL/L (ref 136–145)
TRIGL SERPL-MCNC: 157 MG/DL (ref 0–150)
TSH SERPL DL<=0.05 MIU/L-ACNC: 5.45 UIU/ML (ref 0.27–4.2)
VIT B12 BLD-MCNC: 683 PG/ML (ref 211–946)
VLDLC SERPL-MCNC: 28 MG/DL (ref 5–40)
WBC NRBC COR # BLD AUTO: 6.46 10*3/MM3 (ref 3.4–10.8)

## 2024-08-23 PROCEDURE — 83970 ASSAY OF PARATHORMONE: CPT

## 2024-08-23 PROCEDURE — 36415 COLL VENOUS BLD VENIPUNCTURE: CPT

## 2024-08-23 PROCEDURE — 82746 ASSAY OF FOLIC ACID SERUM: CPT

## 2024-08-23 PROCEDURE — 80061 LIPID PANEL: CPT

## 2024-08-23 PROCEDURE — 82607 VITAMIN B-12: CPT

## 2024-08-23 PROCEDURE — 84443 ASSAY THYROID STIM HORMONE: CPT

## 2024-08-23 PROCEDURE — 80053 COMPREHEN METABOLIC PANEL: CPT

## 2024-08-23 PROCEDURE — 82306 VITAMIN D 25 HYDROXY: CPT

## 2024-08-23 PROCEDURE — 85027 COMPLETE CBC AUTOMATED: CPT

## 2024-08-23 PROCEDURE — 83036 HEMOGLOBIN GLYCOSYLATED A1C: CPT

## 2024-08-23 RX ORDER — PRAVASTATIN SODIUM 80 MG/1
80 TABLET ORAL NIGHTLY
Qty: 90 TABLET | Refills: 3 | Status: SHIPPED | OUTPATIENT
Start: 2024-08-23

## 2024-08-23 RX ORDER — DILTIAZEM HYDROCHLORIDE 120 MG/1
120 CAPSULE, COATED, EXTENDED RELEASE ORAL DAILY
Qty: 90 CAPSULE | Refills: 3 | Status: SHIPPED | OUTPATIENT
Start: 2024-08-23

## 2024-08-23 RX ORDER — GABAPENTIN 300 MG/1
300 CAPSULE ORAL 2 TIMES DAILY
Qty: 180 CAPSULE | Refills: 1 | Status: SHIPPED | OUTPATIENT
Start: 2024-08-23

## 2024-08-23 RX ORDER — SERTRALINE HYDROCHLORIDE 100 MG/1
100 TABLET, FILM COATED ORAL DAILY
Qty: 90 TABLET | Refills: 3 | Status: SHIPPED | OUTPATIENT
Start: 2024-08-23

## 2024-08-23 RX ORDER — RALOXIFENE HYDROCHLORIDE 60 MG/1
60 TABLET, FILM COATED ORAL DAILY
Qty: 90 TABLET | Refills: 3 | Status: SHIPPED | OUTPATIENT
Start: 2024-08-23

## 2024-08-23 RX ORDER — DOXYCYCLINE HYCLATE 100 MG/1
100 CAPSULE ORAL 2 TIMES DAILY
Qty: 180 CAPSULE | Refills: 3 | Status: SHIPPED | OUTPATIENT
Start: 2024-08-23

## 2024-08-23 RX ORDER — IPRATROPIUM BROMIDE 21 UG/1
2 SPRAY, METERED NASAL 3 TIMES DAILY PRN
Qty: 30 ML | Refills: 2 | Status: SHIPPED | OUTPATIENT
Start: 2024-08-23

## 2024-08-23 RX ORDER — CALCITRIOL 0.5 UG/1
0.5 CAPSULE, LIQUID FILLED ORAL 2 TIMES DAILY
Qty: 180 CAPSULE | Refills: 3 | Status: SHIPPED | OUTPATIENT
Start: 2024-08-23

## 2024-08-23 RX ORDER — HYDROCORTISONE ACETATE 25 MG/1
25 SUPPOSITORY RECTAL 2 TIMES DAILY PRN
Qty: 36 EACH | Refills: 2 | Status: SHIPPED | OUTPATIENT
Start: 2024-08-23

## 2024-08-23 RX ORDER — DABIGATRAN ETEXILATE 150 MG/1
150 CAPSULE ORAL 2 TIMES DAILY
Qty: 180 CAPSULE | Refills: 3 | Status: SHIPPED | OUTPATIENT
Start: 2024-08-23

## 2024-08-23 RX ORDER — LEVOTHYROXINE SODIUM 150 MCG
150 TABLET ORAL DAILY
Qty: 90 TABLET | Refills: 3 | Status: SHIPPED | OUTPATIENT
Start: 2024-08-23 | End: 2024-08-24

## 2024-08-23 NOTE — PROGRESS NOTES
The ABCs of the Annual Wellness Visit  Subsequent Medicare Wellness Visit    Subjective    Becky Zavala is a 89 y.o. patient who presents for a Subsequent Medicare Wellness Visit.    The following portions of the patient's history were reviewed and updated as appropriate: allergies, current medications, past family history, past medical history, past social history, past surgical history, and problem list.    Compared to one year ago, the patient feels her physical health is the same.    Compared to one year ago, the patient feels her mental health is the same.    Recent Hospitalizations:  She was not admitted to the hospital during the last year.     Current Medical Providers:  Patient Care Team:  Surjit Gutierrez MD as PCP - General (Family Medicine)  Randy Ott MD as Surgeon (Vascular Surgery)  Tomas Herrera MD as Consulting Physician (Otolaryngology)    Outpatient Medications Prior to Visit   Medication Sig Dispense Refill   • cyanocobalamin 1000 MCG/ML injection Inject 1 mL into the appropriate muscle as directed by prescriber Every 30 (Thirty) Days. 3 mL 3   • Melatonin 10 MG tablet Take 1 tablet by mouth. PATIENT TAKES ONE EVERY EVENING/UNSURE IF TABLET OR CAPSULE     • Multiple Vitamins-Minerals (ICAPS AREDS 2 PO) Take 1 capsule by mouth 2 (Two) Times a Day.     • mupirocin (BACTROBAN) 2 % ointment Apply  topically to the appropriate area as directed 2 (Two) Times a Day. 30 g 1   • potassium chloride (Klor-Con M20) 20 MEQ CR tablet TAKE 1 TABLET DAILY 90 tablet 0   • timolol (TIMOPTIC) 0.5 % ophthalmic solution Administer 1 drop to both eyes 2 (Two) Times a Day.     • vitamin D (ERGOCALCIFEROL) 1.25 MG (23617 UT) capsule capsule Take 1 capsule by mouth 1 (One) Time Per Week.     • vitamin D3 125 MCG (5000 UT) capsule capsule Take 1 capsule by mouth Daily.     • calcitriol (ROCALTROL) 0.5 MCG capsule Take 1 capsule by mouth 2 (Two) Times a Day. 180 capsule 3   • dabigatran etexilate  (Pradaxa) 150 MG capsu Take 1 capsule by mouth 2 (Two) Times a Day. 180 capsule 3   • dilTIAZem CD (CARDIZEM CD) 120 MG 24 hr capsule Take 1 capsule by mouth Daily. 90 capsule 3   • doxycycline (VIBRAMYCIN) 100 MG capsule Take 1 capsule by mouth 2 (Two) Times a Day. 180 capsule 3   • gabapentin (NEURONTIN) 300 MG capsule Take 1 capsule by mouth 2 (Two) Times a Day. 180 capsule 1   • hydrocortisone (ANUSOL-HC) 25 MG suppository Insert 1 suppository into the rectum 2 (Two) Times a Day As Needed for Hemorrhoids. 12 each 1   • pravastatin (PRAVACHOL) 80 MG tablet Take 1 tablet by mouth Every Night. 90 tablet 3   • raloxifene (EVISTA) 60 MG tablet Take 1 tablet by mouth Daily. 90 tablet 3   • sertraline (ZOLOFT) 100 MG tablet Take 1 tablet by mouth Daily. 90 tablet 3   • Synthroid 150 MCG tablet Take 1 tablet by mouth Daily. Brand name only. 90 tablet 3   • furosemide (LASIX) 20 MG tablet Take 1 tablet by mouth Daily. 30 tablet 1   • triamcinolone (KENALOG) 0.1 % cream Apply 1 Application topically to the appropriate area as directed Daily As Needed.       No facility-administered medications prior to visit.       No opioid medication identified on active medication list. I have reviewed chart for other potential  high risk medication/s and harmful drug interactions in the elderly.      Aspirin is not on active medication list.  Aspirin use is not indicated based on review of current medical condition/s. Risk of harm outweighs potential benefits.      Patient Active Problem List   Diagnosis   • Pseudoaneurysm of left femoral artery   • Mycotic aneurysm   • CAD (coronary artery disease)   • Paroxysmal A-fib   • HLD (hyperlipidemia)   • Peripheral vascular disease   • Hypothyroidism   • Anemia   • Postoperative anemia due to acute blood loss   • Hypokalemia   • Popliteal aneurysm   • Acute posthemorrhagic anemia   • Hemorrhagic disorder due to extrinsic circulating anticoagulants   • GI hemorrhage   • Upper GI bleed   •  "Gastrointestinal hemorrhage with melena   • Hypoparathyroidism   • B12 deficiency   • Contusion   • Dementia with behavioral disturbance   • Essential hypertension   • Rectal bleed   • Hematuria   • Rectal bleeding   • AAA (abdominal aortic aneurysm)   • Skin lesion     Advance Care Planning  Advance Directive is on file.  ACP discussion was held with the patient during this visit. Patient has an advance directive in EMR which is still valid.      Objective    Vitals:    24 1300   BP: 131/69   BP Location: Right arm   Patient Position: Sitting   Cuff Size: Adult   Pulse: 62   Temp: 97.3 °F (36.3 °C)   TempSrc: Oral   SpO2: 100%   Weight: 68.8 kg (151 lb 9.6 oz)   Height: 170.2 cm (67.01\")     Estimated body mass index is 23.74 kg/m² as calculated from the following:    Height as of this encounter: 170.2 cm (67.01\").    Weight as of this encounter: 68.8 kg (151 lb 9.6 oz).    BMI is within normal parameters. No other follow-up for BMI required.    Does the patient have evidence of cognitive impairment? Yes        HEALTH RISK ASSESSMENT    Smoking Status:  Social History     Tobacco Use   Smoking Status Never   Smokeless Tobacco Never   Tobacco Comments    3 cig. A day     Alcohol Consumption:  Social History     Substance and Sexual Activity   Alcohol Use Not Currently     Fall Risk Screen:    STEADI Fall Risk Assessment was completed, and patient is at MODERATE risk for falls. Assessment completed on:2024    Depression Screenin/23/2024     1:01 PM   PHQ-2/PHQ-9 Depression Screening   Little Interest or Pleasure in Doing Things 0-->not at all   Feeling Down, Depressed or Hopeless 0-->not at all   PHQ-9: Brief Depression Severity Measure Score 0       Health Habits and Functional and Cognitive Screenin/23/2024     1:02 PM   Functional & Cognitive Status   Do you have difficulty preparing food and eating? No   Do you have difficulty bathing yourself, getting dressed or grooming yourself? " Yes   Do you have difficulty using the toilet? No   Do you have difficulty moving around from place to place? No   Do you have trouble with steps or getting out of a bed or a chair? No   Current Diet Well Balanced Diet   Dental Exam Up to date   Eye Exam Up to date   Exercise (times per week) 2 times per week   Current Exercises Include Other        Exercise Comment therapy   Do you need help using the phone?  Yes   Are you deaf or do you have serious difficulty hearing?  Yes   Do you need help to go to places out of walking distance? Yes   Do you need help shopping? Yes   Do you need help preparing meals?  No   Do you need help with housework?  Yes   Do you need help with laundry? Yes   Do you need help taking your medications? Yes   Do you need help managing money? Yes   Do you ever drive or ride in a car without wearing a seat belt? No   Have you felt unusual stress, anger or loneliness in the last month? No   Who do you live with? Child   If you need help, do you have trouble finding someone available to you? No   Have you been bothered in the last four weeks by sexual problems? No   Do you have difficulty concentrating, remembering or making decisions? Yes       Age-appropriate Screening Schedule:  Refer to the list below for future screening recommendations based on patient's age, sex and/or medical conditions. Orders for these recommended tests are listed in the plan section. The patient has been provided with a written plan.    Health Maintenance   Topic Date Due   • RSV Vaccine - Adults (1 - 1-dose 60+ series) Never done   • COVID-19 Vaccine (6 - 2023-24 season) 09/01/2023   • INFLUENZA VACCINE  08/01/2024   • LIPID PANEL  11/07/2024   • ANNUAL WELLNESS VISIT  08/23/2025   • TDAP/TD VACCINES (4 - Td or Tdap) 07/08/2028   • Pneumococcal Vaccine 65+  Completed   • ZOSTER VACCINE  Completed   • DXA SCAN  Discontinued          CMS Preventative Services Quick Reference  Risk Factors Identified During  Encounter  Hearing Problem:  Family is aware of concerns.  Immunizations Discussed/Encouraged: Influenza, COVID19, and RSV (Respiratory Syncytial Virus)  The above risks/problems have been discussed with the patient.  Pertinent information has been shared with the patient in the After Visit Summary.  An After Visit Summary and PPPS were made available to the patient.    Follow Up:   Next Medicare Wellness visit to be scheduled in 1 year.     Additional E&M Note during same encounter follows:  Patient has multiple medical problems which are significant and separately identifiable that require additional work above and beyond the Medicare Wellness Visit.      Chief Complaint:  Blood pressure, cholesterol, thyroid,     Subjective    History of Present Illness:  In addition to the Medicare wellness exam, Trinity is in today for follow-up on her care.  She has multiple health problems that we are following.  She has hypertension for which she remains on treatment as noted below.  Her blood pressure is in a good range here.     Trinity also has atrial fibrillation and remains on treatment for this.  Part of her treatment includes Pradaxa.  She is having an ongoing issue with rectal bleeding related presumably to hemorrhoids.  Her daughter would like us to make referral to general surgery for evaluation.     She also has hypothyroidism and elevated cholesterol and remains on treatment for these issues.  She is due for blood work at this time     Trinity does have dementia for which she has had some behavioral issues.  She is not doing as much picking as she was previously.  Her memory continues to be a concern.  Her daughter reports that she is not eating as much since we have seen her last.    Review of Systems:  Review of Systems   Constitutional:  Negative for chills and fever.   Respiratory:  Negative for cough and shortness of breath.    Cardiovascular:  Negative for chest pain and palpitations.   Gastrointestinal:  Negative  "for abdominal pain, nausea and vomiting.      Objective   Vital Signs:  /69 (BP Location: Right arm, Patient Position: Sitting, Cuff Size: Adult)   Pulse 62   Temp 97.3 °F (36.3 °C) (Oral)   Ht 170.2 cm (67.01\")   Wt 68.8 kg (151 lb 9.6 oz)   SpO2 100%   BMI 23.74 kg/m²     Physical Exam  Vitals and nursing note reviewed.   Constitutional:       General: She is not in acute distress.     Appearance: She is not ill-appearing.   HENT:      Right Ear: Tympanic membrane and ear canal normal.      Left Ear: Tympanic membrane and ear canal normal.      Ears:      Comments: She is very hard of hearing     Mouth/Throat:      Mouth: Mucous membranes are moist.      Comments: Pharynx appears normal  Eyes:      Extraocular Movements: Extraocular movements intact.      Pupils: Pupils are equal, round, and reactive to light.   Neck:      Thyroid: No thyromegaly.   Cardiovascular:      Rate and Rhythm: Normal rate and regular rhythm.      Heart sounds: No murmur heard.  Pulmonary:      Effort: Pulmonary effort is normal.      Breath sounds: Normal breath sounds.   Abdominal:      General: There is no distension.      Palpations: Abdomen is soft. There is no mass.      Tenderness: There is no abdominal tenderness.   Musculoskeletal:      Cervical back: Normal range of motion.   Lymphadenopathy:      Cervical: Cervical adenopathy (posterior cervical) present.   Skin:     Findings: No lesion or rash.   Neurological:      General: No focal deficit present.      Mental Status: She is oriented to person, place, and time.      Cranial Nerves: No cranial nerve deficit.   Psychiatric:         Mood and Affect: Mood normal.       The following data was reviewed by Surjit Gutierrez MD on 08/23/2024.  Lab Results   Component Value Date    WBC 5.75 12/29/2023    HGB 13.1 12/29/2023    HCT 42.2 12/29/2023    MCV 91.3 12/29/2023     (L) 12/29/2023     Lab Results   Component Value Date    GLUCOSE 106 (H) 11/07/2023    " BUN 23 11/07/2023    CREATININE 0.81 11/07/2023     11/07/2023    K 4.3 11/07/2023     11/07/2023    CO2 30.7 (H) 11/07/2023    CALCIUM 9.2 11/07/2023    PROTEINTOT 5.9 (L) 11/07/2023    ALBUMIN 3.5 11/07/2023    ALT 15 11/07/2023    AST 21 11/07/2023    ALKPHOS 57 11/07/2023    BILITOT 0.5 11/07/2023    EGFR 69.5 11/07/2023    GLOB 2.4 11/07/2023    AGRATIO 1.5 11/07/2023    BCR 28.4 (H) 11/07/2023    ANIONGAP 5.3 11/07/2023      Lab Results   Component Value Date    CHOL 137 11/07/2023    CHLPL 159 05/24/2021    TRIG 95 11/07/2023    HDL 36 (L) 11/07/2023    LDL 83 11/07/2023     Lab Results   Component Value Date    TSH 4.080 12/29/2023     Lab Results   Component Value Date    HGBA1C 5.70 (H) 12/16/2022             Assessment and Plan:   Today, we have reviewed her care.  Overall, Trinity is well today.  Regarding the Medicare wellness exam, she has basically aged out of routine cancer screenings.  We did review vaccines as above.    Regarding her usual care, we will plan to refill her medications and update labs as noted below.  Gabapentin continues to be of benefit for her, and will be refilled as noted.  Tentative follow-up with me will be again in about 6 months via telehealth.    Diagnoses and all orders for this visit:    1. Physical exam (Primary)    2. Paroxysmal A-fib  -     dabigatran etexilate (Pradaxa) 150 MG capsu; Take 1 capsule by mouth 2 (Two) Times a Day.  Dispense: 180 capsule; Refill: 3  -     dilTIAZem CD (CARDIZEM CD) 120 MG 24 hr capsule; Take 1 capsule by mouth Daily.  Dispense: 90 capsule; Refill: 3  -     Comprehensive Metabolic Panel; Future    3. Mixed hyperlipidemia  -     pravastatin (PRAVACHOL) 80 MG tablet; Take 1 tablet by mouth Every Night.  Dispense: 90 tablet; Refill: 3  -     Lipid Panel; Future    4. Acquired hypothyroidism  -     Synthroid 150 MCG tablet; Take 1 tablet by mouth Daily. Brand name only.  Dispense: 90 tablet; Refill: 3  -     TSH; Future    5. Dementia  with behavioral disturbance  -     gabapentin (NEURONTIN) 300 MG capsule; Take 1 capsule by mouth 2 (Two) Times a Day.  Dispense: 180 capsule; Refill: 1  -     sertraline (ZOLOFT) 100 MG tablet; Take 1 tablet by mouth Daily.  Dispense: 90 tablet; Refill: 3    6. Hypoparathyroidism, unspecified hypoparathyroidism type  -     calcitriol (ROCALTROL) 0.5 MCG capsule; Take 1 capsule by mouth 2 (Two) Times a Day.  Dispense: 180 capsule; Refill: 3  -     PTH, Intact; Future  -     Vitamin D 25 hydroxy; Future    7. Platelets decreased  -     CBC (No Diff); Future    8. Hemorrhoids, unspecified hemorrhoid type  -     CBC (No Diff); Future  -     Ambulatory Referral to General Surgery  -     hydrocortisone (ANUSOL-HC) 25 MG suppository; Insert 1 suppository into the rectum 2 (Two) Times a Day As Needed for Hemorrhoids.  Dispense: 36 each; Refill: 2    9. Abdominal aortic aneurysm (AAA) without rupture, unspecified part  Comments:  Continue vascular follow up.    10. Hyperglycemia  -     Hemoglobin A1c; Future    11. B12 deficiency  -     Vitamin B12 & Folate; Future    Other orders  -     doxycycline (VIBRAMYCIN) 100 MG capsule; Take 1 capsule by mouth 2 (Two) Times a Day.  Dispense: 180 capsule; Refill: 3  -     raloxifene (EVISTA) 60 MG tablet; Take 1 tablet by mouth Daily.  Dispense: 90 tablet; Refill: 3  -     ipratropium (ATROVENT) 0.03 % nasal spray; 2 sprays into the nostril(s) as directed by provider 3 (Three) Times a Day As Needed for Rhinitis.  Dispense: 30 mL; Refill: 2       Follow Up  Return in about 6 months (around 2/23/2025) for Recheck.  Patient was given instructions and counseling regarding her condition or for health maintenance advice. Please see specific information pulled into the AVS if appropriate.

## 2024-08-24 ENCOUNTER — PATIENT MESSAGE (OUTPATIENT)
Dept: FAMILY MEDICINE CLINIC | Age: 89
End: 2024-08-24
Payer: MEDICARE

## 2024-08-24 DIAGNOSIS — I48.0 PAROXYSMAL A-FIB: ICD-10-CM

## 2024-08-24 RX ORDER — LEVOTHYROXINE SODIUM 175 MCG
175 TABLET ORAL DAILY
Qty: 90 TABLET | Refills: 3 | Status: SHIPPED | OUTPATIENT
Start: 2024-08-24

## 2024-08-27 RX ORDER — POTASSIUM CHLORIDE 1500 MG/1
20 TABLET, EXTENDED RELEASE ORAL 2 TIMES DAILY
Qty: 180 TABLET | Refills: 3 | Status: SHIPPED | OUTPATIENT
Start: 2024-08-27

## 2024-10-01 ENCOUNTER — TELEPHONE (OUTPATIENT)
Dept: FAMILY MEDICINE CLINIC | Age: 89
End: 2024-10-01
Payer: MEDICARE

## 2024-10-01 ENCOUNTER — LAB (OUTPATIENT)
Dept: LAB | Facility: HOSPITAL | Age: 89
End: 2024-10-01
Payer: MEDICARE

## 2024-10-01 DIAGNOSIS — R41.0 CONFUSION: Primary | ICD-10-CM

## 2024-10-01 DIAGNOSIS — R41.0 CONFUSION: ICD-10-CM

## 2024-10-01 DIAGNOSIS — R82.998 DARK BROWN-COLORED URINE: ICD-10-CM

## 2024-10-01 LAB
AMORPH URATE CRY URNS QL MICRO: ABNORMAL /HPF
BACTERIA UR QL AUTO: ABNORMAL /HPF
BILIRUB UR QL STRIP: ABNORMAL
CLARITY UR: ABNORMAL
COLOR UR: ABNORMAL
GLUCOSE UR STRIP-MCNC: ABNORMAL MG/DL
GRAN CASTS URNS QL MICRO: ABNORMAL /LPF
HGB UR QL STRIP.AUTO: ABNORMAL
KETONES UR QL STRIP: ABNORMAL
LEUKOCYTE ESTERASE UR QL STRIP.AUTO: ABNORMAL
NITRITE UR QL STRIP: ABNORMAL
PH UR STRIP.AUTO: ABNORMAL [PH]
PROT UR QL STRIP: ABNORMAL
RBC # UR STRIP: ABNORMAL /HPF
REF LAB TEST METHOD: ABNORMAL
SP GR UR STRIP: 1.02 (ref 1–1.03)
SQUAMOUS #/AREA URNS HPF: ABNORMAL /HPF
UROBILINOGEN UR QL STRIP: ABNORMAL
WBC # UR STRIP: ABNORMAL /HPF

## 2024-10-01 PROCEDURE — 87186 SC STD MICRODIL/AGAR DIL: CPT

## 2024-10-01 PROCEDURE — 87086 URINE CULTURE/COLONY COUNT: CPT

## 2024-10-01 PROCEDURE — 87077 CULTURE AEROBIC IDENTIFY: CPT

## 2024-10-01 PROCEDURE — 81001 URINALYSIS AUTO W/SCOPE: CPT

## 2024-10-01 RX ORDER — CEFDINIR 300 MG/1
300 CAPSULE ORAL 2 TIMES DAILY
Qty: 20 CAPSULE | Refills: 0 | Status: SHIPPED | OUTPATIENT
Start: 2024-10-01

## 2024-10-01 NOTE — TELEPHONE ENCOUNTER
Pts daughter states pts urine is dark and she is talking out of her head. Believes she has uti. Can you place order for UA? Order pended

## 2024-10-03 LAB — BACTERIA SPEC AEROBE CULT: ABNORMAL

## 2024-10-11 ENCOUNTER — PATIENT MESSAGE (OUTPATIENT)
Dept: FAMILY MEDICINE CLINIC | Age: 89
End: 2024-10-11
Payer: MEDICARE

## 2024-10-28 ENCOUNTER — TELEPHONE (OUTPATIENT)
Dept: FAMILY MEDICINE CLINIC | Age: 89
End: 2024-10-28
Payer: MEDICARE

## 2024-10-28 ENCOUNTER — LAB (OUTPATIENT)
Dept: LAB | Facility: HOSPITAL | Age: 89
End: 2024-10-28
Payer: MEDICARE

## 2024-10-28 DIAGNOSIS — R82.998 DARK BROWN-COLORED URINE: Primary | ICD-10-CM

## 2024-10-28 DIAGNOSIS — R82.998 DARK BROWN-COLORED URINE: ICD-10-CM

## 2024-10-28 PROCEDURE — 87077 CULTURE AEROBIC IDENTIFY: CPT

## 2024-10-28 PROCEDURE — 87086 URINE CULTURE/COLONY COUNT: CPT

## 2024-10-28 PROCEDURE — 81001 URINALYSIS AUTO W/SCOPE: CPT

## 2024-10-28 PROCEDURE — 87186 SC STD MICRODIL/AGAR DIL: CPT

## 2024-10-28 NOTE — TELEPHONE ENCOUNTER
Okay to order urinalysis with culture and if positive she will need either to be seen or televideo visit.  Dr. Fulton

## 2024-10-28 NOTE — TELEPHONE ENCOUNTER
Caller: Aimee Landis    Relationship: Emergency Contact    Best call back number: [phone]     What orders are you requesting (i.e. lab or imaging): URINALYSIS     In what timeframe would the patient need to come in: AS SOON AS POSSIBLE     Where will you receive your lab/imaging services: [facility]     Additional notes: PATIENT'S DAUGHTER THINKS SHE HAS A UTI

## 2024-10-29 LAB
BACTERIA UR QL AUTO: ABNORMAL /HPF
BILIRUB UR QL STRIP: NEGATIVE
CLARITY UR: ABNORMAL
COD CRY URNS QL: ABNORMAL /HPF
COLOR UR: YELLOW
GLUCOSE UR STRIP-MCNC: NEGATIVE MG/DL
HGB UR QL STRIP.AUTO: NEGATIVE
HYALINE CASTS UR QL AUTO: ABNORMAL /LPF
KETONES UR QL STRIP: NEGATIVE
LEUKOCYTE ESTERASE UR QL STRIP.AUTO: ABNORMAL
NITRITE UR QL STRIP: NEGATIVE
PH UR STRIP.AUTO: 6 [PH] (ref 5–8)
PROT UR QL STRIP: NEGATIVE
RBC # UR STRIP: ABNORMAL /HPF
REF LAB TEST METHOD: ABNORMAL
SP GR UR STRIP: 1.01 (ref 1–1.03)
SQUAMOUS #/AREA URNS HPF: ABNORMAL /HPF
STARCH GRANULES URNS QL MICRO: ABNORMAL /HPF
UROBILINOGEN UR QL STRIP: ABNORMAL
WBC # UR STRIP: ABNORMAL /HPF

## 2024-10-30 DIAGNOSIS — N30.00 ACUTE CYSTITIS WITHOUT HEMATURIA: Primary | ICD-10-CM

## 2024-10-30 RX ORDER — NITROFURANTOIN 25; 75 MG/1; MG/1
100 CAPSULE ORAL 2 TIMES DAILY
Qty: 10 CAPSULE | Refills: 0 | Status: SHIPPED | OUTPATIENT
Start: 2024-10-30 | End: 2024-11-01

## 2024-10-31 LAB
BACTERIA SPEC AEROBE CULT: ABNORMAL
BACTERIA SPEC AEROBE CULT: ABNORMAL

## 2024-11-01 RX ORDER — CEFPODOXIME PROXETIL 100 MG/1
100 TABLET, FILM COATED ORAL 2 TIMES DAILY
Qty: 10 TABLET | Refills: 0 | Status: SHIPPED | OUTPATIENT
Start: 2024-11-01 | End: 2024-11-06

## 2024-11-13 RX ORDER — IPRATROPIUM BROMIDE 21 UG/1
2 SPRAY, METERED NASAL 3 TIMES DAILY PRN
Qty: 30 ML | Refills: 2 | Status: SHIPPED | OUTPATIENT
Start: 2024-11-13

## 2024-11-26 ENCOUNTER — TELEPHONE (OUTPATIENT)
Dept: FAMILY MEDICINE CLINIC | Age: 89
End: 2024-11-26
Payer: MEDICARE

## 2024-11-26 DIAGNOSIS — R79.89 ELEVATED SERUM CREATININE: ICD-10-CM

## 2024-11-26 DIAGNOSIS — E03.9 ACQUIRED HYPOTHYROIDISM: ICD-10-CM

## 2024-11-26 DIAGNOSIS — D69.6 THROMBOCYTOPENIA: ICD-10-CM

## 2024-11-26 DIAGNOSIS — I10 ESSENTIAL HYPERTENSION: Primary | ICD-10-CM

## 2024-11-26 NOTE — TELEPHONE ENCOUNTER
----- Message from Shy RUSSO sent at 8/26/2024  8:59 AM EDT -----   TICKLE for BMP, TSH, CBC auto differential in 90 days for hypertension, elevated creatinine, hypothyroidism, thrombocytopenia.  Thanks.

## 2024-12-06 ENCOUNTER — LAB (OUTPATIENT)
Dept: LAB | Facility: HOSPITAL | Age: 89
End: 2024-12-06
Payer: MEDICARE

## 2024-12-06 ENCOUNTER — TELEPHONE (OUTPATIENT)
Dept: FAMILY MEDICINE CLINIC | Age: 89
End: 2024-12-06
Payer: MEDICARE

## 2024-12-06 DIAGNOSIS — D69.6 THROMBOCYTOPENIA: ICD-10-CM

## 2024-12-06 DIAGNOSIS — E03.9 ACQUIRED HYPOTHYROIDISM: ICD-10-CM

## 2024-12-06 DIAGNOSIS — I10 ESSENTIAL HYPERTENSION: ICD-10-CM

## 2024-12-06 DIAGNOSIS — R79.89 ELEVATED SERUM CREATININE: ICD-10-CM

## 2024-12-06 LAB
ANION GAP SERPL CALCULATED.3IONS-SCNC: 10 MMOL/L (ref 5–15)
BASOPHILS # BLD AUTO: 0.01 10*3/MM3 (ref 0–0.2)
BASOPHILS NFR BLD AUTO: 0.2 % (ref 0–1.5)
BUN SERPL-MCNC: 24 MG/DL (ref 8–23)
BUN/CREAT SERPL: 27.3 (ref 7–25)
CALCIUM SPEC-SCNC: 8.6 MG/DL (ref 8.2–9.6)
CHLORIDE SERPL-SCNC: 108 MMOL/L (ref 98–107)
CO2 SERPL-SCNC: 27 MMOL/L (ref 22–29)
CREAT SERPL-MCNC: 0.88 MG/DL (ref 0.57–1)
DEPRECATED RDW RBC AUTO: 45.2 FL (ref 37–54)
EGFRCR SERPLBLD CKD-EPI 2021: 62.5 ML/MIN/1.73
EOSINOPHIL # BLD AUTO: 0.09 10*3/MM3 (ref 0–0.4)
EOSINOPHIL NFR BLD AUTO: 1.6 % (ref 0.3–6.2)
ERYTHROCYTE [DISTWIDTH] IN BLOOD BY AUTOMATED COUNT: 13.3 % (ref 12.3–15.4)
GLUCOSE SERPL-MCNC: 100 MG/DL (ref 65–99)
HCT VFR BLD AUTO: 36.7 % (ref 34–46.6)
HGB BLD-MCNC: 11.4 G/DL (ref 12–15.9)
IMM GRANULOCYTES # BLD AUTO: 0.02 10*3/MM3 (ref 0–0.05)
IMM GRANULOCYTES NFR BLD AUTO: 0.4 % (ref 0–0.5)
LYMPHOCYTES # BLD AUTO: 1.17 10*3/MM3 (ref 0.7–3.1)
LYMPHOCYTES NFR BLD AUTO: 21.2 % (ref 19.6–45.3)
MCH RBC QN AUTO: 28.1 PG (ref 26.6–33)
MCHC RBC AUTO-ENTMCNC: 31.1 G/DL (ref 31.5–35.7)
MCV RBC AUTO: 90.6 FL (ref 79–97)
MONOCYTES # BLD AUTO: 0.52 10*3/MM3 (ref 0.1–0.9)
MONOCYTES NFR BLD AUTO: 9.4 % (ref 5–12)
NEUTROPHILS NFR BLD AUTO: 3.71 10*3/MM3 (ref 1.7–7)
NEUTROPHILS NFR BLD AUTO: 67.2 % (ref 42.7–76)
PLATELET # BLD AUTO: 134 10*3/MM3 (ref 140–450)
PMV BLD AUTO: 11.2 FL (ref 6–12)
POTASSIUM SERPL-SCNC: 4 MMOL/L (ref 3.5–5.2)
RBC # BLD AUTO: 4.05 10*6/MM3 (ref 3.77–5.28)
SODIUM SERPL-SCNC: 145 MMOL/L (ref 136–145)
TSH SERPL DL<=0.05 MIU/L-ACNC: 0.64 UIU/ML (ref 0.27–4.2)
WBC NRBC COR # BLD AUTO: 5.52 10*3/MM3 (ref 3.4–10.8)

## 2024-12-06 PROCEDURE — 80048 BASIC METABOLIC PNL TOTAL CA: CPT

## 2024-12-06 PROCEDURE — 36415 COLL VENOUS BLD VENIPUNCTURE: CPT

## 2024-12-06 PROCEDURE — 84443 ASSAY THYROID STIM HORMONE: CPT

## 2024-12-06 PROCEDURE — 85025 COMPLETE CBC W/AUTO DIFF WBC: CPT

## 2024-12-08 DIAGNOSIS — F03.918 DEMENTIA WITH BEHAVIORAL DISTURBANCE: Primary | ICD-10-CM

## 2024-12-08 DIAGNOSIS — C83.38 DIFFUSE LARGE B-CELL LYMPHOMA OF LYMPH NODES OF MULTIPLE REGIONS: ICD-10-CM

## 2024-12-08 RX ORDER — HALOPERIDOL 2 MG/ML
1 SOLUTION ORAL EVERY 4 HOURS PRN
Qty: 30 ML | Refills: 0 | Status: SHIPPED | OUTPATIENT
Start: 2024-12-08

## 2024-12-08 RX ORDER — LORAZEPAM 2 MG/ML
1 CONCENTRATE ORAL EVERY 4 HOURS PRN
Qty: 30 ML | Refills: 0 | Status: SHIPPED | OUTPATIENT
Start: 2024-12-08

## 2024-12-08 RX ORDER — MORPHINE SULFATE 100 MG/5ML
5 SOLUTION ORAL EVERY 4 HOURS PRN
Qty: 30 ML | Refills: 0 | Status: SHIPPED | OUTPATIENT
Start: 2024-12-08

## 2025-01-02 ENCOUNTER — OUTSIDE FACILITY SERVICE (OUTPATIENT)
Dept: FAMILY MEDICINE CLINIC | Age: OVER 89
End: 2025-01-02
Payer: MEDICARE

## 2025-01-02 PROCEDURE — G0180 MD CERTIFICATION HHA PATIENT: HCPCS | Performed by: FAMILY MEDICINE

## 2025-01-31 ENCOUNTER — PATIENT MESSAGE (OUTPATIENT)
Dept: FAMILY MEDICINE CLINIC | Age: OVER 89
End: 2025-01-31
Payer: MEDICARE

## 2025-02-27 DIAGNOSIS — C83.38 DIFFUSE LARGE B-CELL LYMPHOMA OF LYMPH NODES OF MULTIPLE REGIONS: ICD-10-CM

## 2025-02-27 DIAGNOSIS — F03.918 DEMENTIA WITH BEHAVIORAL DISTURBANCE: ICD-10-CM

## 2025-02-27 RX ORDER — LORAZEPAM 2 MG/ML
1 CONCENTRATE ORAL EVERY 4 HOURS PRN
Qty: 30 ML | Refills: 0 | Status: SHIPPED | OUTPATIENT
Start: 2025-02-27

## (undated) DEVICE — THE SINGLE USE ETRAP – POLYP TRAP IS USED FOR SUCTION RETRIEVAL OF ENDOSCOPICALLY REMOVED POLYPS.: Brand: ETRAP

## (undated) DEVICE — TUBING, SUCTION, 1/4" X 10', STRAIGHT: Brand: MEDLINE

## (undated) DEVICE — Device: Brand: DEFENDO AIR/WATER/SUCTION AND BIOPSY VALVE

## (undated) DEVICE — SINGLE-USE BIOPSY FORCEPS: Brand: RADIAL JAW 4

## (undated) DEVICE — CANN NASL CO2 TRULINK W/O2 A/

## (undated) DEVICE — BITEBLOCK OMNI BLOC

## (undated) DEVICE — THE TORRENT IRRIGATION SCOPE CONNECTOR IS USED WITH THE TORRENT IRRIGATION TUBING TO PROVIDE IRRIGATION FLUIDS SUCH AS STERILE WATER DURING GASTROINTESTINAL ENDOSCOPIC PROCEDURES WHEN USED IN CONJUNCTION WITH AN IRRIGATION PUMP (OR ELECTROSURGICAL UNIT).: Brand: TORRENT

## (undated) DEVICE — SNAR POLYP SENSATION STDOVL 27 240 BX40